# Patient Record
Sex: MALE | Race: WHITE | Employment: OTHER | RURAL
[De-identification: names, ages, dates, MRNs, and addresses within clinical notes are randomized per-mention and may not be internally consistent; named-entity substitution may affect disease eponyms.]

---

## 2019-05-23 ENCOUNTER — HOSPITAL ENCOUNTER (OUTPATIENT)
Dept: CT IMAGING | Facility: HOSPITAL | Age: 74
Discharge: HOME | End: 2019-05-23
Payer: MEDICARE

## 2019-05-23 DIAGNOSIS — R63.4 WEIGHT LOSS: ICD-10-CM

## 2019-05-23 DIAGNOSIS — Z72.0 TOBACCO ABUSE: ICD-10-CM

## 2019-05-23 PROCEDURE — G0297 LDCT FOR LUNG CA SCREEN: HCPCS

## 2019-06-04 ENCOUNTER — APPOINTMENT (OUTPATIENT)
Dept: GENERAL RADIOLOGY | Facility: HOSPITAL | Age: 74
End: 2019-06-04

## 2019-06-04 ENCOUNTER — HOSPITAL ENCOUNTER (EMERGENCY)
Facility: HOSPITAL | Age: 74
Discharge: HOME | End: 2019-06-04
Attending: EMERGENCY MEDICINE | Admitting: EMERGENCY MEDICINE

## 2019-06-04 VITALS
WEIGHT: 195 LBS | HEIGHT: 72 IN | BODY MASS INDEX: 26.41 KG/M2 | OXYGEN SATURATION: 94 % | DIASTOLIC BLOOD PRESSURE: 69 MMHG | HEART RATE: 75 BPM | TEMPERATURE: 99.4 F | RESPIRATION RATE: 18 BRPM | SYSTOLIC BLOOD PRESSURE: 122 MMHG

## 2019-06-04 DIAGNOSIS — T67.9XXA HEAT EXPOSURE, INITIAL ENCOUNTER: Primary | ICD-10-CM

## 2019-06-04 DIAGNOSIS — T67.5XXA HEAT EXHAUSTION, INITIAL ENCOUNTER: ICD-10-CM

## 2019-06-04 DIAGNOSIS — E87.6 HYPOKALEMIA: ICD-10-CM

## 2019-06-04 DIAGNOSIS — R50.9 HYPERTHERMIA: ICD-10-CM

## 2019-06-04 LAB
ALBUMIN SERPL-MCNC: 3.9 G/DL (ref 3.5–5.2)
ALBUMIN/GLOB SERPL: 1.2 G/DL
ALP SERPL-CCNC: 83 U/L (ref 39–117)
ALT SERPL W P-5'-P-CCNC: 10 U/L (ref 1–41)
ANION GAP SERPL CALCULATED.3IONS-SCNC: 15 MMOL/L
AST SERPL-CCNC: 15 U/L (ref 1–40)
BACTERIA UR QL AUTO: ABNORMAL /HPF
BASOPHILS # BLD AUTO: 0.03 10*3/MM3 (ref 0–0.2)
BASOPHILS NFR BLD AUTO: 0.1 % (ref 0–1.5)
BILIRUB SERPL-MCNC: 0.5 MG/DL (ref 0.2–1.2)
BILIRUB UR QL STRIP: NEGATIVE
BUN BLD-MCNC: 14 MG/DL (ref 8–23)
BUN/CREAT SERPL: 11.9 (ref 7–25)
CALCIUM SPEC-SCNC: 9.3 MG/DL (ref 8.6–10.5)
CHLORIDE SERPL-SCNC: 111 MMOL/L (ref 98–107)
CK SERPL-CCNC: 167 U/L (ref 20–200)
CLARITY UR: CLEAR
CO2 SERPL-SCNC: 18 MMOL/L (ref 22–29)
COLOR UR: YELLOW
CREAT BLD-MCNC: 1.18 MG/DL (ref 0.76–1.27)
D-LACTATE SERPL-SCNC: 1.8 MMOL/L (ref 0.5–2)
DEPRECATED RDW RBC AUTO: 49.9 FL (ref 37–54)
EOSINOPHIL # BLD AUTO: 0.01 10*3/MM3 (ref 0–0.4)
EOSINOPHIL NFR BLD AUTO: 0 % (ref 0.3–6.2)
ERYTHROCYTE [DISTWIDTH] IN BLOOD BY AUTOMATED COUNT: 15.6 % (ref 12.3–15.4)
GFR SERPL CREATININE-BSD FRML MDRD: 60 ML/MIN/1.73
GLOBULIN UR ELPH-MCNC: 3.2 GM/DL
GLUCOSE BLD-MCNC: 151 MG/DL (ref 65–99)
GLUCOSE UR STRIP-MCNC: NEGATIVE MG/DL
HCT VFR BLD AUTO: 41.2 % (ref 37.5–51)
HGB BLD-MCNC: 13.6 G/DL (ref 13–17.7)
HGB UR QL STRIP.AUTO: ABNORMAL
HOLD SPECIMEN: NORMAL
HOLD SPECIMEN: NORMAL
IMM GRANULOCYTES # BLD AUTO: 0.07 10*3/MM3 (ref 0–0.05)
IMM GRANULOCYTES NFR BLD AUTO: 0.3 % (ref 0–0.5)
INR PPP: 1.17 (ref 0.85–1.16)
KETONES UR QL STRIP: NEGATIVE
LEUKOCYTE ESTERASE UR QL STRIP.AUTO: NEGATIVE
LYMPHOCYTES # BLD AUTO: 0.93 10*3/MM3 (ref 0.7–3.1)
LYMPHOCYTES NFR BLD AUTO: 4.5 % (ref 19.6–45.3)
MAGNESIUM SERPL-MCNC: 2.1 MG/DL (ref 1.6–2.4)
MCH RBC QN AUTO: 29.2 PG (ref 26.6–33)
MCHC RBC AUTO-ENTMCNC: 33 G/DL (ref 31.5–35.7)
MCV RBC AUTO: 88.6 FL (ref 79–97)
MONOCYTES # BLD AUTO: 0.96 10*3/MM3 (ref 0.1–0.9)
MONOCYTES NFR BLD AUTO: 4.7 % (ref 5–12)
MYOGLOBIN SERPL-MCNC: 76.6 NG/ML (ref 28–72)
NEUTROPHILS # BLD AUTO: 18.65 10*3/MM3 (ref 1.7–7)
NEUTROPHILS NFR BLD AUTO: 90.7 % (ref 42.7–76)
NITRITE UR QL STRIP: NEGATIVE
PH UR STRIP.AUTO: 7 [PH] (ref 5–8)
PLATELET # BLD AUTO: 212 10*3/MM3 (ref 140–450)
PMV BLD AUTO: 10.3 FL (ref 6–12)
POTASSIUM BLD-SCNC: 3.1 MMOL/L (ref 3.5–5.2)
PROT SERPL-MCNC: 7.1 G/DL (ref 6–8.5)
PROT UR QL STRIP: NEGATIVE
PROTHROMBIN TIME: 14.3 SECONDS (ref 11.2–14.3)
RBC # BLD AUTO: 4.65 10*6/MM3 (ref 4.14–5.8)
RBC # UR: ABNORMAL /HPF
REF LAB TEST METHOD: ABNORMAL
SODIUM BLD-SCNC: 144 MMOL/L (ref 136–145)
SP GR UR STRIP: <=1.005 (ref 1–1.03)
SQUAMOUS #/AREA URNS HPF: ABNORMAL /HPF
T4 FREE SERPL-MCNC: 1.07 NG/DL (ref 0.93–1.7)
THEOPHYLLINE SERPL-MCNC: 5 MCG/ML (ref 10–20)
TROPONIN T SERPL-MCNC: <0.01 NG/ML (ref 0–0.03)
TROPONIN T SERPL-MCNC: <0.01 NG/ML (ref 0–0.03)
TSH SERPL DL<=0.05 MIU/L-ACNC: 1.25 MIU/ML (ref 0.27–4.2)
UROBILINOGEN UR QL STRIP: ABNORMAL
WBC NRBC COR # BLD: 20.58 10*3/MM3 (ref 3.4–10.8)
WBC UR QL AUTO: ABNORMAL /HPF
WHOLE BLOOD HOLD SPECIMEN: NORMAL
WHOLE BLOOD HOLD SPECIMEN: NORMAL

## 2019-06-04 PROCEDURE — 85007 BL SMEAR W/DIFF WBC COUNT: CPT | Performed by: EMERGENCY MEDICINE

## 2019-06-04 PROCEDURE — 96360 HYDRATION IV INFUSION INIT: CPT

## 2019-06-04 PROCEDURE — 84439 ASSAY OF FREE THYROXINE: CPT | Performed by: EMERGENCY MEDICINE

## 2019-06-04 PROCEDURE — 71045 X-RAY EXAM CHEST 1 VIEW: CPT

## 2019-06-04 PROCEDURE — 96361 HYDRATE IV INFUSION ADD-ON: CPT

## 2019-06-04 PROCEDURE — 93005 ELECTROCARDIOGRAM TRACING: CPT | Performed by: EMERGENCY MEDICINE

## 2019-06-04 PROCEDURE — 87040 BLOOD CULTURE FOR BACTERIA: CPT | Performed by: EMERGENCY MEDICINE

## 2019-06-04 PROCEDURE — 82550 ASSAY OF CK (CPK): CPT | Performed by: EMERGENCY MEDICINE

## 2019-06-04 PROCEDURE — 81001 URINALYSIS AUTO W/SCOPE: CPT | Performed by: EMERGENCY MEDICINE

## 2019-06-04 PROCEDURE — 99285 EMERGENCY DEPT VISIT HI MDM: CPT

## 2019-06-04 PROCEDURE — 83874 ASSAY OF MYOGLOBIN: CPT | Performed by: EMERGENCY MEDICINE

## 2019-06-04 PROCEDURE — 85610 PROTHROMBIN TIME: CPT | Performed by: EMERGENCY MEDICINE

## 2019-06-04 PROCEDURE — 80198 ASSAY OF THEOPHYLLINE: CPT | Performed by: EMERGENCY MEDICINE

## 2019-06-04 PROCEDURE — 83735 ASSAY OF MAGNESIUM: CPT | Performed by: EMERGENCY MEDICINE

## 2019-06-04 PROCEDURE — 83605 ASSAY OF LACTIC ACID: CPT | Performed by: EMERGENCY MEDICINE

## 2019-06-04 PROCEDURE — 84484 ASSAY OF TROPONIN QUANT: CPT | Performed by: EMERGENCY MEDICINE

## 2019-06-04 PROCEDURE — 80050 GENERAL HEALTH PANEL: CPT | Performed by: EMERGENCY MEDICINE

## 2019-06-04 RX ORDER — TOPIRAMATE 50 MG/1
50 TABLET, FILM COATED ORAL 2 TIMES DAILY
COMMUNITY

## 2019-06-04 RX ORDER — BUDESONIDE AND FORMOTEROL FUMARATE DIHYDRATE 160; 4.5 UG/1; UG/1
2 AEROSOL RESPIRATORY (INHALATION)
COMMUNITY
End: 2019-06-17

## 2019-06-04 RX ORDER — ENALAPRIL MALEATE 20 MG/1
20 TABLET ORAL NIGHTLY
COMMUNITY
End: 2022-05-03

## 2019-06-04 RX ORDER — ROPINIROLE 1 MG/1
1 TABLET, FILM COATED ORAL NIGHTLY
COMMUNITY

## 2019-06-04 RX ORDER — POTASSIUM CHLORIDE 750 MG/1
20 CAPSULE, EXTENDED RELEASE ORAL ONCE
Status: COMPLETED | OUTPATIENT
Start: 2019-06-04 | End: 2019-06-04

## 2019-06-04 RX ORDER — HYDROCODONE BITARTRATE AND ACETAMINOPHEN 5; 325 MG/1; MG/1
1 TABLET ORAL 2 TIMES DAILY
Status: ON HOLD | COMMUNITY
End: 2019-08-30 | Stop reason: SDUPTHER

## 2019-06-04 RX ORDER — SODIUM CHLORIDE 0.9 % (FLUSH) 0.9 %
10 SYRINGE (ML) INJECTION AS NEEDED
Status: DISCONTINUED | OUTPATIENT
Start: 2019-06-04 | End: 2019-06-05 | Stop reason: HOSPADM

## 2019-06-04 RX ORDER — LEVOFLOXACIN 500 MG/1
500 TABLET, FILM COATED ORAL DAILY
COMMUNITY
End: 2019-07-02

## 2019-06-04 RX ORDER — SODIUM CHLORIDE 9 MG/ML
125 INJECTION, SOLUTION INTRAVENOUS CONTINUOUS
Status: DISCONTINUED | OUTPATIENT
Start: 2019-06-04 | End: 2019-06-05 | Stop reason: HOSPADM

## 2019-06-04 RX ORDER — SIMVASTATIN 10 MG
10 TABLET ORAL NIGHTLY
COMMUNITY
End: 2019-08-21

## 2019-06-04 RX ORDER — MONTELUKAST SODIUM 10 MG/1
10 TABLET ORAL NIGHTLY
COMMUNITY

## 2019-06-04 RX ORDER — DOCUSATE SODIUM 100 MG/1
100 CAPSULE, LIQUID FILLED ORAL NIGHTLY PRN
COMMUNITY

## 2019-06-04 RX ORDER — ACETAMINOPHEN 500 MG
1000 TABLET ORAL ONCE
Status: COMPLETED | OUTPATIENT
Start: 2019-06-04 | End: 2019-06-04

## 2019-06-04 RX ORDER — ERGOCALCIFEROL 1.25 MG/1
50000 CAPSULE ORAL WEEKLY
COMMUNITY
End: 2019-06-17

## 2019-06-04 RX ORDER — AMLODIPINE BESYLATE 10 MG/1
10 TABLET ORAL DAILY
COMMUNITY

## 2019-06-04 RX ORDER — RANITIDINE 150 MG/1
150 TABLET ORAL 2 TIMES DAILY
COMMUNITY
End: 2021-03-31

## 2019-06-04 RX ORDER — FINASTERIDE 5 MG/1
5 TABLET, FILM COATED ORAL DAILY
COMMUNITY

## 2019-06-04 RX ORDER — PROCHLORPERAZINE MALEATE 10 MG
10 TABLET ORAL EVERY 6 HOURS PRN
COMMUNITY
End: 2019-08-21

## 2019-06-04 RX ORDER — METHOCARBAMOL 500 MG/1
500 TABLET, FILM COATED ORAL 2 TIMES DAILY
COMMUNITY
End: 2021-03-31

## 2019-06-04 RX ORDER — LANOLIN ALCOHOL/MO/W.PET/CERES
1000 CREAM (GRAM) TOPICAL DAILY
COMMUNITY
End: 2019-06-17

## 2019-06-04 RX ADMIN — SODIUM CHLORIDE 1000 ML: 9 INJECTION, SOLUTION INTRAVENOUS at 18:51

## 2019-06-04 RX ADMIN — POTASSIUM CHLORIDE 20 MEQ: 750 CAPSULE, EXTENDED RELEASE ORAL at 20:04

## 2019-06-04 RX ADMIN — ACETAMINOPHEN 1000 MG: 500 TABLET, FILM COATED ORAL at 20:03

## 2019-06-04 RX ADMIN — SODIUM CHLORIDE 125 ML/HR: 9 INJECTION, SOLUTION INTRAVENOUS at 19:46

## 2019-06-04 NOTE — ED PROVIDER NOTES
"Subjective   Angi Marinelli is a 74 y.o.male who presents to the ED with complaints of a hyperthermia. 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 When they arrived to the scene, the patient was alert and his temperature was 106. En route to the emergency department, they gave the patient cold compresses which brought his temperature to 102. He also complains of a cough and congestion, but he denies any dysuria, shortness of breath, rhinorrhea, hematuria, hematochezia, chest pain, or leg swelling. Additionally, he was recently diagnosed with pneumonia, for which he is taking Levaquin. Furthermore, he has a history of three heat strokes, HTN, HLD, and COPD. During his previous heat strokes, he states he was \"out working and not drinking\" just like he was today. There are no other complaints at this time.  Patient reports he feels markedly improved from onset of transport.        History provided by:  Patient and EMS personnel  Fever   Max temp prior to arrival:  106  Temp source:  Oral  Severity:  Severe  Onset quality:  Sudden  Duration:  1 day  Timing:  Constant  Progression:  Unchanged  Chronicity:  New  Relieved by:  Cold compresses  Worsened by:  Nothing  Associated symptoms: congestion and cough    Associated symptoms: no chest pain, no chills, no diarrhea, no dysuria, no myalgias, no nausea, no rash, no rhinorrhea, no sore throat and no vomiting    Associated symptoms comment:  Cough 1 week ago now nearly completely resolved  Her medical crew reports he was confused on their arrival but rapidly resolved after cooling via air conditioning in the helicopter and cool compresses with cool IV fluids  Risk factors: no immunosuppression, no recent travel and no sick contacts        Review of Systems   Constitutional: Positive for fever. Negative for chills.   HENT: Positive for congestion. Negative for rhinorrhea and sore throat.    Eyes: Negative.  "   Respiratory: Positive for cough. Negative for shortness of breath.    Cardiovascular: Negative.  Negative for chest pain and leg swelling.   Gastrointestinal: Negative.  Negative for blood in stool, diarrhea, nausea and vomiting.   Genitourinary: Negative.  Negative for dysuria and hematuria.   Musculoskeletal: Negative for myalgias.   Skin: Negative.  Negative for rash.   Neurological:        Weakness on EMS and [employer] arrival resolved now   All other systems reviewed and are negative.      Past Medical History:   Diagnosis Date   • Arthritis    • COPD (chronic obstructive pulmonary disease) (CMS/HCC)    • GERD (gastroesophageal reflux disease)    • Hyperlipidemia    • Hypertension    • Migraine    • Restless leg syndrome        Allergies   Allergen Reactions   • Penicillins Rash       Past Surgical History:   Procedure Laterality Date   • ARTERIAL ANEURYSM REPAIR         History reviewed. No pertinent family history.    Social History     Socioeconomic History   • Marital status:      Spouse name: Not on file   • Number of children: Not on file   • Years of education: Not on file   • Highest education level: Not on file   Tobacco Use   • Smoking status: Former Smoker   Substance and Sexual Activity   • Alcohol use: No     Frequency: Never   • Drug use: No         Objective   Physical Exam   Constitutional: He is oriented to person, place, and time. He appears well-developed and well-nourished. No distress.   HENT:   Head: Normocephalic and atraumatic.   Nose: Nose normal.   Small laceration on the dorsum of the tongue.    Eyes: Conjunctivae and EOM are normal. Pupils are equal, round, and reactive to light. Right eye exhibits no discharge. Left eye exhibits no discharge. No scleral icterus.   Neck: Normal range of motion. Neck supple.   Cardiovascular: Regular rhythm, normal heart sounds and intact distal pulses. Exam reveals no gallop and no friction rub.   No murmur heard.  Pulmonary/Chest: Effort  normal. No respiratory distress. He has no wheezes. He has rhonchi. He has no rales. He exhibits no tenderness.   Mild bilateral rhonchi.  Clear partially with deep inspiration and cough   Abdominal: Soft. Bowel sounds are normal. There is no tenderness.   Musculoskeletal: Normal range of motion. He exhibits no edema, tenderness or deformity.   Neurological: He is alert and oriented to person, place, and time. He displays normal reflexes. No cranial nerve deficit or sensory deficit. He exhibits normal muscle tone. Coordination normal.   Cranial nerves II through XII are intact.  DTRs strength and sensation are intact.  There is no dysarthria aphasia or ataxia.  Neurologically intact   Skin: Skin is warm and dry.   Psychiatric: He has a normal mood and affect. His behavior is normal.   Nursing note and vitals reviewed.      Procedures         ED Course  ED Course as of Jun 05 0047   Tue Jun 04, 2019 2228 Patient is serially reevaluated throughout ED course with last reevaluation now.  I discussed findings and evaluation.  His CPK is normal.  His myoglobin is minimally elevated.  He does dip positive for moderate blood.  He has been thoroughly hydrated here in the ED.In discussion with patient and family this is at least the third episode of identical symptoms with heat exposure and marked hyperthermia.I discussed the evaluation but have asked him to see his doctor in the office this week for reevaluation, to trim any of the medications from his profile that may be contributing to this.  I have asked him to avoid heat exposure and absolutely refrain from it for the next 6 weeks.  Of discussed excellent hydration but mandatory reevaluation this week in the office to recheck his renal function studies and labsWe discussed indications for immediate return to the ED.  I have explained this at length with the patient and family.Very pleasant reliable patient and caring family verbalized understanding agreement plan of  care, need for follow-up, and indications for immediate return, as well as restrictions.  [HH]      ED Course User Index  [HH] Javier Bullard MD     Recent Results (from the past 24 hour(s))   Comprehensive Metabolic Panel    Collection Time: 06/04/19  6:56 PM   Result Value Ref Range    Glucose 151 (H) 65 - 99 mg/dL    BUN 14 8 - 23 mg/dL    Creatinine 1.18 0.76 - 1.27 mg/dL    Sodium 144 136 - 145 mmol/L    Potassium 3.1 (L) 3.5 - 5.2 mmol/L    Chloride 111 (H) 98 - 107 mmol/L    CO2 18.0 (L) 22.0 - 29.0 mmol/L    Calcium 9.3 8.6 - 10.5 mg/dL    Total Protein 7.1 6.0 - 8.5 g/dL    Albumin 3.90 3.50 - 5.20 g/dL    ALT (SGPT) 10 1 - 41 U/L    AST (SGOT) 15 1 - 40 U/L    Alkaline Phosphatase 83 39 - 117 U/L    Total Bilirubin 0.5 0.2 - 1.2 mg/dL    eGFR Non African Amer 60 (L) >60 mL/min/1.73    Globulin 3.2 gm/dL    A/G Ratio 1.2 g/dL    BUN/Creatinine Ratio 11.9 7.0 - 25.0    Anion Gap 15.0 mmol/L   Troponin    Collection Time: 06/04/19  6:56 PM   Result Value Ref Range    Troponin T <0.010 0.000 - 0.030 ng/mL   Protime-INR    Collection Time: 06/04/19  6:56 PM   Result Value Ref Range    Protime 14.3 11.2 - 14.3 Seconds    INR 1.17 (H) 0.85 - 1.16   TSH    Collection Time: 06/04/19  6:56 PM   Result Value Ref Range    TSH 1.250 0.270 - 4.200 mIU/mL   Magnesium    Collection Time: 06/04/19  6:56 PM   Result Value Ref Range    Magnesium 2.1 1.6 - 2.4 mg/dL   T4, Free    Collection Time: 06/04/19  6:56 PM   Result Value Ref Range    Free T4 1.07 0.93 - 1.70 ng/dL   Light Blue Top    Collection Time: 06/04/19  6:56 PM   Result Value Ref Range    Extra Tube hold for add-on    Green Top (Gel)    Collection Time: 06/04/19  6:56 PM   Result Value Ref Range    Extra Tube Hold for add-ons.    Lavender Top    Collection Time: 06/04/19  6:56 PM   Result Value Ref Range    Extra Tube hold for add-on    Gold Top - SST    Collection Time: 06/04/19  6:56 PM   Result Value Ref Range    Extra Tube Hold for add-ons.    CBC Auto  Differential    Collection Time: 06/04/19  6:56 PM   Result Value Ref Range    WBC 20.58 (H) 3.40 - 10.80 10*3/mm3    RBC 4.65 4.14 - 5.80 10*6/mm3    Hemoglobin 13.6 13.0 - 17.7 g/dL    Hematocrit 41.2 37.5 - 51.0 %    MCV 88.6 79.0 - 97.0 fL    MCH 29.2 26.6 - 33.0 pg    MCHC 33.0 31.5 - 35.7 g/dL    RDW 15.6 (H) 12.3 - 15.4 %    RDW-SD 49.9 37.0 - 54.0 fl    MPV 10.3 6.0 - 12.0 fL    Platelets 212 140 - 450 10*3/mm3    Neutrophil % 90.7 (H) 42.7 - 76.0 %    Lymphocyte % 4.5 (L) 19.6 - 45.3 %    Monocyte % 4.7 (L) 5.0 - 12.0 %    Eosinophil % 0.0 (L) 0.3 - 6.2 %    Basophil % 0.1 0.0 - 1.5 %    Immature Grans % 0.3 0.0 - 0.5 %    Neutrophils, Absolute 18.65 (H) 1.70 - 7.00 10*3/mm3    Lymphocytes, Absolute 0.93 0.70 - 3.10 10*3/mm3    Monocytes, Absolute 0.96 (H) 0.10 - 0.90 10*3/mm3    Eosinophils, Absolute 0.01 0.00 - 0.40 10*3/mm3    Basophils, Absolute 0.03 0.00 - 0.20 10*3/mm3    Immature Grans, Absolute 0.07 (H) 0.00 - 0.05 10*3/mm3   CK    Collection Time: 06/04/19  6:56 PM   Result Value Ref Range    Creatine Kinase 167 20 - 200 U/L   Myoglobin, Serum    Collection Time: 06/04/19  6:56 PM   Result Value Ref Range    Myoglobin 76.6 (H) 28.0 - 72.0 ng/mL   Theophylline Level    Collection Time: 06/04/19  6:56 PM   Result Value Ref Range    Theophylline Level 5.0 (L) 10.0 - 20.0 mcg/mL   Urinalysis With Culture If Indicated - Urine, Clean Catch    Collection Time: 06/04/19  8:07 PM   Result Value Ref Range    Color, UA Yellow Yellow, Straw    Appearance, UA Clear Clear    pH, UA 7.0 5.0 - 8.0    Specific Gravity, UA <=1.005 1.005 - 1.030    Glucose, UA Negative Negative    Ketones, UA Negative Negative    Bilirubin, UA Negative Negative    Blood, UA Moderate (2+) (A) Negative    Protein, UA Negative Negative    Leuk Esterase, UA Negative Negative    Nitrite, UA Negative Negative    Urobilinogen, UA 0.2 E.U./dL 0.2 - 1.0 E.U./dL   Urinalysis, Microscopic Only - Urine, Clean Catch    Collection Time: 06/04/19   8:07 PM   Result Value Ref Range    RBC, UA 7-12 (A) None Seen, 0-2 /HPF    WBC, UA 0-2 None Seen, 0-2 /HPF    Bacteria, UA None Seen None Seen, Trace /HPF    Squamous Epithelial Cells, UA 0-2 None Seen, 0-2 /HPF    Methodology Automated Microscopy    Lactic Acid, Plasma    Collection Time: 06/04/19  8:24 PM   Result Value Ref Range    Lactate 1.8 0.5 - 2.0 mmol/L   Troponin    Collection Time: 06/04/19  9:29 PM   Result Value Ref Range    Troponin T <0.010 0.000 - 0.030 ng/mL     Note: In addition to lab results from this visit, the labs listed above may include labs taken at another facility or during a different encounter within the last 24 hours. Please correlate lab times with ED admission and discharge times for further clarification of the services performed during this visit.    XR Chest 1 View   Final Result   No acute cardiopulmonary findings.      Signer Name: Cosme Knapp MD    Signed: 6/4/2019 7:26 PM    Workstation Name: [id number]            Vitals:    06/04/19 2130 06/04/19 2200 06/04/19 2229 06/04/19 2230   BP: 121/64 115/65  122/69   Pulse: 83 75 75    Resp:       Temp:       TempSrc:       SpO2: 91% 92% 93% 94%   Weight:       Height:         Medications   sodium chloride 0.9 % flush 10 mL (not administered)   sodium chloride 0.9 % infusion (0 mL/hr Intravenous Stopped 6/4/19 2234)   sodium chloride 0.9 % bolus 1,000 mL (0 mL Intravenous Stopped 6/4/19 1945)   acetaminophen (TYLENOL) tablet 1,000 mg (1,000 mg Oral Given 6/4/19 2003)   potassium chloride (MICRO-K) CR capsule 20 mEq (20 mEq Oral Given 6/4/19 2004)     ECG/EMG Results (last 24 hours)     ** No results found for the last 24 hours. **        ECG 12 Lead   Final Result   Test Reason : RPT   Blood Pressure : **/** mmHG   Vent. Rate : 080 BPM     Atrial Rate : 080 BPM      P-R Int : 180 ms          QRS Dur : 112 ms       QT Int : 414 ms       P-R-T Axes : 050 033 032 degrees      QTc Int : 477 ms      Normal sinus rhythm   When compared  with ECG of 04-JUN-2019 18:46,   No significant change was found   Confirmed by DANIEL BULLARD MD ([id number]) on 6/4/2019 10:14:05 PM      Referred By:  EDMD           Confirmed By:DANIEL BULLARD MD      ECG 12 Lead   Final Result   Test Reason : HEAT STROKE   Blood Pressure : **/** mmHG   Vent. Rate : 109 BPM     Atrial Rate : 109 BPM      P-R Int : 172 ms          QRS Dur : 114 ms       QT Int : 360 ms       P-R-T Axes : 061 047 045 degrees      QTc Int : 484 ms      Sinus tachycardia   Nonspecific ST abnormality   Abnormal ECG   No previous ECGs available   Confirmed by DANIEL BULLARD MD ([id number]) on 6/4/2019 7:36:27 PM      Referred By:  JOSE           Confirmed By:DANIEL BULLARD MD                          MDM    Final diagnoses:   Heat exposure, initial encounter   Heat exhaustion, initial encounter   Hyperthermia   Hypokalemia       Documentation assistance provided by chetan Fisher.  Information recorded by the scribe was done at my direction and has been verified and validated by me.     Matthew Fisher  06/04/19 1850       Matthew Fisher  06/04/19 2217       Daniel Bullard MD  06/05/19 0043       Daniel Bullard MD  06/05/19 0047

## 2019-06-05 NOTE — DISCHARGE INSTRUCTIONS
Follow up with your primary care provider in 24-48 hours.  Recheck labs including muscle enzymes and kidney functions please.    Have your physician look through your medication list and see if there are any medications that could be contributing to this that could possibly be discontinued, like the theophylline    Push fluids.    Absolute restriction from heat exposure for the next 6 weeks to prevent immediate recurrence and complications    Immediately return to the ER if you develop any new or worsening symptoms.

## 2019-06-09 LAB
BACTERIA SPEC AEROBE CULT: NORMAL
BACTERIA SPEC AEROBE CULT: NORMAL

## 2019-06-17 ENCOUNTER — PREP FOR SURGERY (OUTPATIENT)
Dept: OTHER | Facility: HOSPITAL | Age: 74
End: 2019-06-17

## 2019-06-17 ENCOUNTER — APPOINTMENT (OUTPATIENT)
Dept: PREADMISSION TESTING | Facility: HOSPITAL | Age: 74
End: 2019-06-17

## 2019-06-17 ENCOUNTER — OFFICE VISIT (OUTPATIENT)
Dept: PULMONOLOGY | Facility: CLINIC | Age: 74
End: 2019-06-17

## 2019-06-17 VITALS
OXYGEN SATURATION: 97 % | HEIGHT: 72 IN | DIASTOLIC BLOOD PRESSURE: 68 MMHG | WEIGHT: 192 LBS | BODY MASS INDEX: 26.01 KG/M2 | HEART RATE: 66 BPM | SYSTOLIC BLOOD PRESSURE: 118 MMHG

## 2019-06-17 VITALS
HEIGHT: 72 IN | DIASTOLIC BLOOD PRESSURE: 66 MMHG | OXYGEN SATURATION: 97 % | WEIGHT: 191 LBS | SYSTOLIC BLOOD PRESSURE: 108 MMHG | BODY MASS INDEX: 25.87 KG/M2 | RESPIRATION RATE: 16 BRPM | HEART RATE: 80 BPM

## 2019-06-17 DIAGNOSIS — R93.89 ABNORMAL CT OF THE CHEST: ICD-10-CM

## 2019-06-17 DIAGNOSIS — R59.0 MEDIASTINAL LYMPHADENOPATHY: ICD-10-CM

## 2019-06-17 DIAGNOSIS — R06.02 SOB (SHORTNESS OF BREATH): Primary | ICD-10-CM

## 2019-06-17 DIAGNOSIS — J44.9 CHRONIC OBSTRUCTIVE PULMONARY DISEASE, UNSPECIFIED COPD TYPE (HCC): ICD-10-CM

## 2019-06-17 DIAGNOSIS — IMO0001 LUNG NODULE < 6CM ON CT: ICD-10-CM

## 2019-06-17 DIAGNOSIS — R93.89 ABNORMAL CT OF THE CHEST: Primary | ICD-10-CM

## 2019-06-17 DIAGNOSIS — F17.200 SMOKING: ICD-10-CM

## 2019-06-17 DIAGNOSIS — R91.8 LUNG NODULE, MULTIPLE: ICD-10-CM

## 2019-06-17 PROCEDURE — 94726 PLETHYSMOGRAPHY LUNG VOLUMES: CPT | Performed by: INTERNAL MEDICINE

## 2019-06-17 PROCEDURE — 94060 EVALUATION OF WHEEZING: CPT | Performed by: INTERNAL MEDICINE

## 2019-06-17 PROCEDURE — 93005 ELECTROCARDIOGRAM TRACING: CPT | Performed by: INTERNAL MEDICINE

## 2019-06-17 PROCEDURE — 99205 OFFICE O/P NEW HI 60 MIN: CPT | Performed by: INTERNAL MEDICINE

## 2019-06-17 PROCEDURE — 94729 DIFFUSING CAPACITY: CPT | Performed by: INTERNAL MEDICINE

## 2019-06-17 RX ORDER — IPRATROPIUM BROMIDE AND ALBUTEROL SULFATE 2.5; .5 MG/3ML; MG/3ML
3 SOLUTION RESPIRATORY (INHALATION) EVERY 6 HOURS PRN
COMMUNITY

## 2019-06-17 RX ORDER — CHOLECALCIFEROL (VITAMIN D3) 1250 MCG
CAPSULE ORAL
COMMUNITY
End: 2021-03-31

## 2019-06-17 RX ORDER — BUDESONIDE AND FORMOTEROL FUMARATE DIHYDRATE 160; 4.5 UG/1; UG/1
AEROSOL RESPIRATORY (INHALATION) EVERY 12 HOURS SCHEDULED
COMMUNITY
End: 2019-07-08

## 2019-06-17 RX ORDER — MAGNESIUM 200 MG
TABLET ORAL
COMMUNITY
End: 2019-06-27 | Stop reason: HOSPADM

## 2019-06-17 RX ORDER — LEVALBUTEROL TARTRATE 45 UG/1
2 AEROSOL, METERED ORAL EVERY 6 HOURS PRN
Qty: 1 INHALER | Refills: 5 | Status: SHIPPED | OUTPATIENT
Start: 2019-06-17

## 2019-06-17 RX ORDER — GUAIFENESIN 200 MG/1
400 TABLET ORAL EVERY 4 HOURS PRN
COMMUNITY
End: 2019-08-21

## 2019-06-17 RX ORDER — AZITHROMYCIN 250 MG/1
250 TABLET, FILM COATED ORAL DAILY
COMMUNITY
End: 2019-06-27 | Stop reason: HOSPADM

## 2019-06-17 RX ORDER — SODIUM CHLORIDE 9 MG/ML
42 INJECTION, SOLUTION INTRAVENOUS CONTINUOUS
Status: CANCELLED | OUTPATIENT
Start: 2019-06-17

## 2019-06-17 RX ORDER — NICOTINE 21 MG/24HR
PATCH, TRANSDERMAL 24 HOURS TRANSDERMAL
Refills: 0 | COMMUNITY
Start: 2019-06-05 | End: 2021-03-31

## 2019-06-17 RX ORDER — ROFLUMILAST 500 UG/1
500 TABLET ORAL DAILY
Refills: 2 | COMMUNITY
Start: 2019-06-05 | End: 2020-01-06

## 2019-06-17 NOTE — PROGRESS NOTES
CONSULT NOTE    Requested by:   Georgiana Cortés DO Bobrowski, Brittany, DO      Chief Complaint   Patient presents with   • Consult   • Breathing Problem       Subjective:  Angi Marinelli is a 74 y.o. male.     History of Present Illness   Patient comes in today for consultation because of abnormal CT.    Patient underwent a CT due to history smoking history and ?weight loss.  he was told that the imaging study showed abnormality and lung nodules for which a pulmonology consultation was requested    Patient says that he is a smoker and has been doing so for the past 60 years. The patient describes family members with a history of lung cancer, in his sister.      He denies any shortness of breath, cough or phlegm production.    Used to work in saw mills and cement factories.      The following portions of the patient's history were reviewed and updated as appropriate: allergies, current medications, past family history, past medical history, past social history and past surgical history.    Review of Systems   Constitutional: Negative for chills, fatigue and fever.   HENT: Negative for sinus pressure, sneezing and sore throat.    Respiratory: Negative for cough, chest tightness, shortness of breath and wheezing.    Cardiovascular: Negative for palpitations and leg swelling.   Psychiatric/Behavioral: Negative for sleep disturbance.   All other systems reviewed and are negative.      Past Medical History:   Diagnosis Date   • Abnormal CT lung screening    • Arthritis    • COPD (chronic obstructive pulmonary disease) (CMS/[facility])    • Full dentures    • GERD (gastroesophageal reflux disease)    • Heat stroke     x 3   • Hyperlipidemia    • Hypertension    • Migraine    • PONV (postoperative nausea and vomiting)    • Restless leg syndrome    • Tobacco abuse        Social History     Tobacco Use   • Smoking status: Current Every Day Smoker     Packs/day: 1.00     Years: 57.00     Pack years: 57.00     Types:  "Cigarettes   • Smokeless tobacco: Never Used   Substance Use Topics   • Alcohol use: No     Frequency: Never         Objective:  Visit Vitals  /66   Pulse 80   Resp 16   Ht 182.9 cm (72.01\")   Wt 86.6 kg (191 lb)   SpO2 97%   BMI 25.90 kg/m²       Physical Exam   Constitutional: He is oriented to person, place, and time. He appears well-developed.   HENT:   Dentures noted.   Right facial bandage in place.    Eyes: EOM are normal.   Neck: Neck supple. No JVD present.   Cardiovascular: Normal rate and regular rhythm.   Pulmonary/Chest: Effort normal. He has no wheezes. He has no rales.   Somewhat hyperresonant to percussion.  Decreased Air Entry Bilaterally.   Musculoskeletal:   Gait was normal.   Neurological: He is alert and oriented to person, place, and time.   Skin: Skin is warm and dry.   Psychiatric: He has a normal mood and affect. His behavior is normal.   Vitals reviewed.      Assessment/Plan:  Angi was seen today for consult and breathing problem.    Diagnoses and all orders for this visit:    Abnormal CT of the chest  -     Pulmonary Function Test    Lung nodule, multiple  -     Pulmonary Function Test    Smoking  -     Pulmonary Function Test    Chronic obstructive pulmonary disease, unspecified COPD type (CMS/HCC)    Mediastinal lymphadenopathy    Other orders  -     levalbuterol (XOPENEX HFA) 45 MCG/ACT inhaler; Inhale 2 puffs Every 6 (Six) Hours As Needed for Wheezing or Shortness of Air.        Return in about 3 weeks (around 7/8/2019) for Recheck.    DISCUSSION(if any):  I have reviewed the patient's imaging studies and shared them with him.  CT of the chest confirms right upper lobe/apical lung nodule measuring about 2.7 cm.  There also seems to be lymph nodes in the mediastinum.  The lymph nodes within the mediastinum appear to be somewhat calcified.  I also showed him the emphysema, which was visible on the CT scan.    I have also reviewed his last Oncology provider's office note that " mentions findings highly concerning for lung cancer with possible liver metastases.  It also mentioned that the patient was scheduled for a procedure involving his cheek and he wanted to wait for that procedure to be performed before further work-up.     Last ER note, from 6/4/2019 listed hyperthermia, hypokalemia and heat exhaustion.     ===========================  ===========================    PFTs were reviewed. Moderate COPD noted.     Laboratory workup was also reviewed which showed   Lab Results   Component Value Date    EOSABS 0.01 06/04/2019     Laboratory workup also showed   Lab Results   Component Value Date    CO2 18.0 (L) 06/04/2019       ===========================  ===========================    Patient was advised to start using rescue inhaler for when necessary purposes    Patient was also advised to keep a log of the use of rescue inhaler.      The risks of EBUS with bronchscopy including but not limited to damage to the overlying structures, pneumothorax, bleeding, worsening of respiratory failure, requirement for chest tube placement among others were explained.    The alternatives were also discussed with the patient      The patient has considered the above-mentioned risks, benefits and alternatives and has agreed to proceed with the procedure.      Dictated utilizing Dragon dictation.    This document was electronically signed by Iggy Beard MD on 06/17/19 at 1:17 PM

## 2019-06-27 ENCOUNTER — APPOINTMENT (OUTPATIENT)
Dept: GENERAL RADIOLOGY | Facility: HOSPITAL | Age: 74
End: 2019-06-27

## 2019-06-27 ENCOUNTER — HOSPITAL ENCOUNTER (OUTPATIENT)
Facility: HOSPITAL | Age: 74
Setting detail: HOSPITAL OUTPATIENT SURGERY
Discharge: HOME | End: 2019-06-27
Attending: INTERNAL MEDICINE | Admitting: INTERNAL MEDICINE

## 2019-06-27 ENCOUNTER — ANESTHESIA (OUTPATIENT)
Dept: PERIOP | Facility: HOSPITAL | Age: 74
End: 2019-06-27

## 2019-06-27 ENCOUNTER — ANESTHESIA EVENT (OUTPATIENT)
Dept: PERIOP | Facility: HOSPITAL | Age: 74
End: 2019-06-27

## 2019-06-27 VITALS
OXYGEN SATURATION: 95 % | RESPIRATION RATE: 16 BRPM | TEMPERATURE: 97.4 F | SYSTOLIC BLOOD PRESSURE: 108 MMHG | HEART RATE: 68 BPM | DIASTOLIC BLOOD PRESSURE: 67 MMHG

## 2019-06-27 DIAGNOSIS — R93.89 ABNORMAL CT OF THE CHEST: ICD-10-CM

## 2019-06-27 DIAGNOSIS — IMO0001 LUNG NODULE < 6CM ON CT: ICD-10-CM

## 2019-06-27 DIAGNOSIS — R59.0 MEDIASTINAL LYMPHADENOPATHY: ICD-10-CM

## 2019-06-27 LAB
DEPRECATED RDW RBC AUTO: 48.1 FL (ref 37–54)
ERYTHROCYTE [DISTWIDTH] IN BLOOD BY AUTOMATED COUNT: 14.6 % (ref 12.3–15.4)
HCT VFR BLD AUTO: 42.9 % (ref 37.5–51)
HGB BLD-MCNC: 13.7 G/DL (ref 13–17.7)
MCH RBC QN AUTO: 28.5 PG (ref 26.6–33)
MCHC RBC AUTO-ENTMCNC: 31.9 G/DL (ref 31.5–35.7)
MCV RBC AUTO: 89.4 FL (ref 79–97)
PLATELET # BLD AUTO: 242 10*3/MM3 (ref 140–450)
PMV BLD AUTO: 10 FL (ref 6–12)
RBC # BLD AUTO: 4.8 10*6/MM3 (ref 4.14–5.8)
WBC NRBC COR # BLD: 10.7 10*3/MM3 (ref 3.4–10.8)

## 2019-06-27 PROCEDURE — 94640 AIRWAY INHALATION TREATMENT: CPT

## 2019-06-27 PROCEDURE — 87206 SMEAR FLUORESCENT/ACID STAI: CPT | Performed by: INTERNAL MEDICINE

## 2019-06-27 PROCEDURE — 85027 COMPLETE CBC AUTOMATED: CPT | Performed by: INTERNAL MEDICINE

## 2019-06-27 PROCEDURE — 25010000002 MIDAZOLAM PER 1 MG: Performed by: NURSE ANESTHETIST, CERTIFIED REGISTERED

## 2019-06-27 PROCEDURE — 87181 SC STD AGAR DILUTION PER AGT: CPT | Performed by: INTERNAL MEDICINE

## 2019-06-27 PROCEDURE — 71045 X-RAY EXAM CHEST 1 VIEW: CPT

## 2019-06-27 PROCEDURE — C1726 CATH, BAL DIL, NON-VASCULAR: HCPCS | Performed by: INTERNAL MEDICINE

## 2019-06-27 PROCEDURE — 87102 FUNGUS ISOLATION CULTURE: CPT | Performed by: INTERNAL MEDICINE

## 2019-06-27 PROCEDURE — 87186 SC STD MICRODIL/AGAR DIL: CPT | Performed by: INTERNAL MEDICINE

## 2019-06-27 PROCEDURE — 25010000002 DEXAMETHASONE PER 1 MG: Performed by: NURSE ANESTHETIST, CERTIFIED REGISTERED

## 2019-06-27 PROCEDURE — 25010000002 FENTANYL CITRATE (PF) 100 MCG/2ML SOLUTION: Performed by: NURSE ANESTHETIST, CERTIFIED REGISTERED

## 2019-06-27 PROCEDURE — 87205 SMEAR GRAM STAIN: CPT | Performed by: INTERNAL MEDICINE

## 2019-06-27 PROCEDURE — 25010000002 PROPOFOL 200 MG/20ML EMULSION: Performed by: NURSE ANESTHETIST, CERTIFIED REGISTERED

## 2019-06-27 PROCEDURE — 31652 BRONCH EBUS SAMPLNG 1/2 NODE: CPT | Performed by: INTERNAL MEDICINE

## 2019-06-27 PROCEDURE — 25010000002 ONDANSETRON PER 1 MG: Performed by: NURSE ANESTHETIST, CERTIFIED REGISTERED

## 2019-06-27 PROCEDURE — 31628 BRONCHOSCOPY/LUNG BX EACH: CPT | Performed by: INTERNAL MEDICINE

## 2019-06-27 PROCEDURE — 87070 CULTURE OTHR SPECIMN AEROBIC: CPT | Performed by: INTERNAL MEDICINE

## 2019-06-27 PROCEDURE — 25010000002 SUCCINYLCHOLINE PER 20 MG: Performed by: NURSE ANESTHETIST, CERTIFIED REGISTERED

## 2019-06-27 PROCEDURE — 87116 MYCOBACTERIA CULTURE: CPT | Performed by: INTERNAL MEDICINE

## 2019-06-27 PROCEDURE — 31623 DX BRONCHOSCOPE/BRUSH: CPT | Performed by: INTERNAL MEDICINE

## 2019-06-27 PROCEDURE — 31624 DX BRONCHOSCOPE/LAVAGE: CPT | Performed by: INTERNAL MEDICINE

## 2019-06-27 PROCEDURE — 87077 CULTURE AEROBIC IDENTIFY: CPT | Performed by: INTERNAL MEDICINE

## 2019-06-27 RX ORDER — FENTANYL CITRATE 50 UG/ML
INJECTION, SOLUTION INTRAMUSCULAR; INTRAVENOUS AS NEEDED
Status: DISCONTINUED | OUTPATIENT
Start: 2019-06-27 | End: 2019-06-27 | Stop reason: SURG

## 2019-06-27 RX ORDER — DEXAMETHASONE SODIUM PHOSPHATE 4 MG/ML
INJECTION, SOLUTION INTRA-ARTICULAR; INTRALESIONAL; INTRAMUSCULAR; INTRAVENOUS; SOFT TISSUE AS NEEDED
Status: DISCONTINUED | OUTPATIENT
Start: 2019-06-27 | End: 2019-06-27 | Stop reason: SURG

## 2019-06-27 RX ORDER — PROPOFOL 10 MG/ML
INJECTION, EMULSION INTRAVENOUS AS NEEDED
Status: DISCONTINUED | OUTPATIENT
Start: 2019-06-27 | End: 2019-06-27 | Stop reason: SURG

## 2019-06-27 RX ORDER — MEPERIDINE HYDROCHLORIDE 25 MG/ML
12.5 INJECTION INTRAMUSCULAR; INTRAVENOUS; SUBCUTANEOUS
Status: DISCONTINUED | OUTPATIENT
Start: 2019-06-27 | End: 2019-06-27 | Stop reason: HOSPADM

## 2019-06-27 RX ORDER — PROMETHAZINE HYDROCHLORIDE 25 MG/1
25 SUPPOSITORY RECTAL ONCE AS NEEDED
Status: DISCONTINUED | OUTPATIENT
Start: 2019-06-27 | End: 2019-06-27 | Stop reason: HOSPADM

## 2019-06-27 RX ORDER — ONDANSETRON 2 MG/ML
INJECTION INTRAMUSCULAR; INTRAVENOUS AS NEEDED
Status: DISCONTINUED | OUTPATIENT
Start: 2019-06-27 | End: 2019-06-27 | Stop reason: SURG

## 2019-06-27 RX ORDER — PROMETHAZINE HYDROCHLORIDE 25 MG/1
25 TABLET ORAL ONCE AS NEEDED
Status: DISCONTINUED | OUTPATIENT
Start: 2019-06-27 | End: 2019-06-27 | Stop reason: HOSPADM

## 2019-06-27 RX ORDER — SUCCINYLCHOLINE CHLORIDE 20 MG/ML
INJECTION INTRAMUSCULAR; INTRAVENOUS AS NEEDED
Status: DISCONTINUED | OUTPATIENT
Start: 2019-06-27 | End: 2019-06-27 | Stop reason: SURG

## 2019-06-27 RX ORDER — IPRATROPIUM BROMIDE AND ALBUTEROL SULFATE 2.5; .5 MG/3ML; MG/3ML
3 SOLUTION RESPIRATORY (INHALATION) ONCE
Status: COMPLETED | OUTPATIENT
Start: 2019-06-27 | End: 2019-06-27

## 2019-06-27 RX ORDER — SODIUM CHLORIDE, SODIUM LACTATE, POTASSIUM CHLORIDE, CALCIUM CHLORIDE 600; 310; 30; 20 MG/100ML; MG/100ML; MG/100ML; MG/100ML
1000 INJECTION, SOLUTION INTRAVENOUS CONTINUOUS
Status: CANCELLED | OUTPATIENT
Start: 2019-06-27

## 2019-06-27 RX ORDER — PROMETHAZINE HYDROCHLORIDE 25 MG/ML
6.25 INJECTION, SOLUTION INTRAMUSCULAR; INTRAVENOUS ONCE AS NEEDED
Status: DISCONTINUED | OUTPATIENT
Start: 2019-06-27 | End: 2019-06-27 | Stop reason: HOSPADM

## 2019-06-27 RX ORDER — SCOLOPAMINE TRANSDERMAL SYSTEM 1 MG/1
1 PATCH, EXTENDED RELEASE TRANSDERMAL ONCE
Status: DISCONTINUED | OUTPATIENT
Start: 2019-06-27 | End: 2019-06-27 | Stop reason: HOSPADM

## 2019-06-27 RX ORDER — LIDOCAINE HYDROCHLORIDE 20 MG/ML
INJECTION, SOLUTION INFILTRATION; PERINEURAL AS NEEDED
Status: DISCONTINUED | OUTPATIENT
Start: 2019-06-27 | End: 2019-06-27 | Stop reason: HOSPADM

## 2019-06-27 RX ORDER — ROCURONIUM BROMIDE 10 MG/ML
INJECTION, SOLUTION INTRAVENOUS AS NEEDED
Status: DISCONTINUED | OUTPATIENT
Start: 2019-06-27 | End: 2019-06-27 | Stop reason: SURG

## 2019-06-27 RX ORDER — SODIUM CHLORIDE 9 MG/ML
42 INJECTION, SOLUTION INTRAVENOUS CONTINUOUS
Status: DISCONTINUED | OUTPATIENT
Start: 2019-06-27 | End: 2019-06-27 | Stop reason: HOSPADM

## 2019-06-27 RX ORDER — MIDAZOLAM HYDROCHLORIDE 1 MG/ML
INJECTION INTRAMUSCULAR; INTRAVENOUS AS NEEDED
Status: DISCONTINUED | OUTPATIENT
Start: 2019-06-27 | End: 2019-06-27 | Stop reason: SURG

## 2019-06-27 RX ADMIN — IPRATROPIUM BROMIDE AND ALBUTEROL SULFATE 3 ML: .5; 3 SOLUTION RESPIRATORY (INHALATION) at 12:32

## 2019-06-27 RX ADMIN — PROPOFOL 200 MG: 10 INJECTION, EMULSION INTRAVENOUS at 13:15

## 2019-06-27 RX ADMIN — SUCCINYLCHOLINE CHLORIDE 174.2 MG: 20 INJECTION, SOLUTION INTRAMUSCULAR; INTRAVENOUS at 13:15

## 2019-06-27 RX ADMIN — SCOPALAMINE 1 PATCH: 1 PATCH, EXTENDED RELEASE TRANSDERMAL at 12:37

## 2019-06-27 RX ADMIN — ONDANSETRON 4 MG: 2 INJECTION INTRAMUSCULAR; INTRAVENOUS at 13:15

## 2019-06-27 RX ADMIN — FENTANYL CITRATE 100 MCG: 50 INJECTION, SOLUTION INTRAMUSCULAR; INTRAVENOUS at 13:15

## 2019-06-27 RX ADMIN — MIDAZOLAM HYDROCHLORIDE 2 MG: 1 INJECTION, SOLUTION INTRAMUSCULAR; INTRAVENOUS at 13:15

## 2019-06-27 RX ADMIN — SODIUM CHLORIDE 42 ML/HR: 9 INJECTION, SOLUTION INTRAVENOUS at 12:37

## 2019-06-27 RX ADMIN — DEXAMETHASONE SODIUM PHOSPHATE 8 MG: 4 INJECTION, SOLUTION INTRAMUSCULAR; INTRAVENOUS at 13:15

## 2019-06-27 RX ADMIN — ROCURONIUM BROMIDE 10 MG: 10 INJECTION INTRAVENOUS at 13:15

## 2019-06-27 NOTE — ANESTHESIA PROCEDURE NOTES
Airway  Urgency: elective    Airway not difficult    General Information and Staff    Patient location during procedure: OR  CRNA: Ernie Johnson CRNA    Indications and Patient Condition  Indications for airway management: airway protection    Preoxygenated: yes  Mask difficulty assessment: 1 - vent by mask    Final Airway Details  Final airway type: endotracheal airway      Successful airway: ETT  Cuffed: yes   Successful intubation technique: direct laryngoscopy  Facilitating devices/methods: intubating stylet  Endotracheal tube insertion site: oral  Blade: Baptiste  Blade size: 2  ETT size (mm): 9.0  Cormack-Lehane Classification: grade I - full view of glottis  Placement verified by: chest auscultation and capnometry   Cuff volume (mL): 10  Measured from: lips  ETT to lips (cm): 22  Number of attempts at approach: 1    Additional Comments  Airway placed without problems. Dentition and Lips as pre-induction. ETT cuff inflated to minimal occlusive pressure.

## 2019-06-27 NOTE — ANESTHESIA POSTPROCEDURE EVALUATION
Patient: Angi Marinelli    Procedure Summary     Date:  06/27/19 Room / Location:  [facility] /  [facility] OR    Anesthesia Start:  1312 Anesthesia Stop:  1430    Procedure:  BRONCHOSCOPY WITH ENDOBRONCHIAL ULTRASOUND (N/A Bronchus) Diagnosis:       Abnormal CT of the chest      Lung nodule < 6cm on CT      Mediastinal lymphadenopathy      (Abnormal CT of the chest [R93.89])      (Lung nodule < 6cm on CT [R91.1])      (Mediastinal lymphadenopathy [R59.0])    Surgeon:  Iggy Beard MD Provider:  Ernie Johnson CRNA    Anesthesia Type:  general ASA Status:  3          Anesthesia Type: general  Last vitals  BP   108/67 (06/27/19 1530)   Temp   97.4 °F (36.3 °C) (06/27/19 1530)   Pulse   68 (06/27/19 1530)   Resp   16 (06/27/19 1530)     SpO2   95 % (06/27/19 1530)     Post Anesthesia Care and Evaluation    Patient location during evaluation: PACU  Patient participation: complete - patient participated  Level of consciousness: awake and alert and awake  Pain score: 3  Pain management: adequate  Airway patency: patent  Anesthetic complications: No anesthetic complications  PONV Status: controlled  Cardiovascular status: acceptable, hemodynamically stable and stable  Respiratory status: acceptable and nasal cannula  Hydration status: acceptable

## 2019-06-27 NOTE — ANESTHESIA PREPROCEDURE EVALUATION
Anesthesia Evaluation     Patient summary reviewed and Nursing notes reviewed   history of anesthetic complications: PONV  NPO Solid Status: > 8 hours  NPO Liquid Status: > 8 hours           Airway   Mallampati: II  TM distance: >3 FB  Neck ROM: full  No difficulty expected  Dental    (+) lower dentures    Pulmonary    (+) a smoker Current, COPD, shortness of breath, decreased breath sounds,     ROS comment: Lung nodule and mediastinal lymphadenopathy  Cardiovascular     (+) hypertension, TRIPP, PVD, hyperlipidemia,       Neuro/Psych  (+) headaches,     GI/Hepatic/Renal/Endo    (+)  GERD,      Musculoskeletal     (+) arthralgias, chronic pain, myalgias,   Abdominal    Substance History      OB/GYN          Other   (+) arthritis                   Anesthesia Plan    ASA 3     general   (Risks and benefits discussed including risk of aspiration, recall and dental damage. All patient questions answered. Will continue with POC.)  intravenous induction   Anesthetic plan, all risks, benefits, and alternatives have been provided, discussed and informed consent has been obtained with: patient.

## 2019-07-02 LAB
BACTERIA SPEC RESP CULT: ABNORMAL
GRAM STN SPEC: ABNORMAL

## 2019-07-02 RX ORDER — LEVOFLOXACIN 500 MG/1
500 TABLET, FILM COATED ORAL DAILY
Qty: 5 TABLET | Refills: 0 | Status: SHIPPED | OUTPATIENT
Start: 2019-07-02 | End: 2019-07-07

## 2019-07-08 ENCOUNTER — OFFICE VISIT (OUTPATIENT)
Dept: PULMONOLOGY | Facility: CLINIC | Age: 74
End: 2019-07-08

## 2019-07-08 VITALS
OXYGEN SATURATION: 96 % | HEIGHT: 72 IN | HEART RATE: 81 BPM | RESPIRATION RATE: 18 BRPM | SYSTOLIC BLOOD PRESSURE: 110 MMHG | BODY MASS INDEX: 25.06 KG/M2 | WEIGHT: 185 LBS | DIASTOLIC BLOOD PRESSURE: 60 MMHG

## 2019-07-08 DIAGNOSIS — C34.91 ADENOCARCINOMA OF LUNG, RIGHT (HCC): Primary | ICD-10-CM

## 2019-07-08 DIAGNOSIS — F17.200 SMOKING: ICD-10-CM

## 2019-07-08 DIAGNOSIS — R06.02 SOB (SHORTNESS OF BREATH): ICD-10-CM

## 2019-07-08 DIAGNOSIS — J44.9 CHRONIC OBSTRUCTIVE PULMONARY DISEASE, UNSPECIFIED COPD TYPE (HCC): ICD-10-CM

## 2019-07-08 PROCEDURE — 99214 OFFICE O/P EST MOD 30 MIN: CPT | Performed by: INTERNAL MEDICINE

## 2019-07-08 RX ORDER — LANOLIN ALCOHOL/MO/W.PET/CERES
CREAM (GRAM) TOPICAL
Refills: 2 | COMMUNITY
Start: 2019-06-22 | End: 2021-03-31

## 2019-07-08 RX ORDER — SIMVASTATIN 40 MG
40 TABLET ORAL EVERY EVENING
Refills: 2 | COMMUNITY
Start: 2019-07-01

## 2019-07-08 NOTE — PROGRESS NOTES
"Chief Complaint   Patient presents with   • Follow-up     Abnormal CT of the chest    • Shortness of Breath       Subjective   Angi Marinelli is a 74 y.o. male.     History of Present Illness   Patient comes in today to follow-up on the results of bronchoscopy.  The patient actually has been using Symbicort but says that it causes him to have palpitations and he does not \"like it as much\".    The patient continues to smoke although is trying to cut back.    I discussed the results of biopsy with the patient.    The following portions of the patient's history were reviewed and updated as appropriate: allergies, current medications, past family history, past medical history, past social history and past surgical history.    Review of Systems   Constitutional: Negative for chills and fever.   HENT: Negative for sinus pressure and sneezing.    Respiratory: Negative for cough, shortness of breath and wheezing.        Objective   Visit Vitals  /60   Pulse 81   Resp 18   Ht 182.9 cm (72.01\")   Wt 83.9 kg (185 lb)   SpO2 96%   BMI 25.08 kg/m²       Physical Exam   Constitutional: He is oriented to person, place, and time. He appears well-developed and well-nourished.   Eyes: EOM are normal.   Neck: Neck supple.   Cardiovascular: Normal rate and regular rhythm.   Pulmonary/Chest: Effort normal. No respiratory distress.   Somewhat hyperresonant to percussion  Somewhat decreased A/E with out wheezing noted.   Musculoskeletal: He exhibits no edema.   Neurological: He is alert and oriented to person, place, and time.   Skin: Skin is warm and dry.   Psychiatric: He has a normal mood and affect.   Vitals reviewed.      Assessment/Plan   Angi was seen today for follow-up and shortness of breath.    Diagnoses and all orders for this visit:    Adenocarcinoma of lung, right (CMS/HCC)    Smoking    Chronic obstructive pulmonary disease, unspecified COPD type (CMS/HCC)    SOB (shortness of breath)    Other orders  -     " tiotropium bromide monohydrate (SPIRIVA RESPIMAT) 2.5 MCG/ACT aerosol solution inhaler; Inhale 2 puffs Daily.           Return in about 6 weeks (around 8/19/2019) for Recheck, Overbook.    DISCUSSION (if any):  I reviewed the pathology results with the patient.  I told the patient and his family member that the biopsy results were consistent with poorly differentiated adenocarcinoma.    I told the patient that it is imperative that he follows with oncology as there was a mention of liver lesion and he may need further work-up including possible liver biopsy before any conclusion can be drawn with regards to the staging of the lung cancer.      I told the patient that if the liver lesion was nonmalignant then he may be a candidate for resection of the lung nodule which is adenocarcinoma.      I also informed the patient that he will need a PET scan before any assessment can be made, with regards to appropriateness for surgery.    I told the patient's family member to make sure that he has an appointment with Dr. Liddle as soon as possible.  She is the patient's oncologist and I will also ask my office staff to try to contact her office and schedule this patient sooner.    The patient is reporting some palpitations with Symbicort, which is likely secondary to the beta agonist component therefore I started him on Spiriva.      Patient was advised to continue using rescue inhaler for when necessary purposes    Patient was also advised to keep a log of the use of rescue inhaler.      I spent a total of 30 minutes face to face with this patient. his pertinent current and previous data, as applicable, were reviewed. Patient's diagnostic studies were also reviewed. More than 15 minutes of the time spent, was spent in counseling about patient's underlying disease process, reviewing and discussing available radiological and laboratory data, staging work-up, medication compliance (as applicable) and lifestyle modifications  that may impact patient's health.     Dictated utilizing Dragon dictation.    This document was electronically signed by Iggy Beard MD on 07/08/19 at 2:53 PM

## 2019-07-09 LAB
LAB AP CASE REPORT: NORMAL
LAB AP CLINICAL INFORMATION: NORMAL
PATH REPORT.FINAL DX SPEC: NORMAL

## 2019-07-22 DIAGNOSIS — C34.91 ADENOCARCINOMA OF LUNG, RIGHT (HCC): Primary | ICD-10-CM

## 2019-07-30 ENCOUNTER — OFFICE VISIT (OUTPATIENT)
Dept: CARDIAC SURGERY | Facility: CLINIC | Age: 74
End: 2019-07-30

## 2019-07-30 ENCOUNTER — PREP FOR SURGERY (OUTPATIENT)
Dept: OTHER | Facility: HOSPITAL | Age: 74
End: 2019-07-30

## 2019-07-30 VITALS
SYSTOLIC BLOOD PRESSURE: 120 MMHG | WEIGHT: 187 LBS | TEMPERATURE: 97.9 F | OXYGEN SATURATION: 99 % | DIASTOLIC BLOOD PRESSURE: 70 MMHG | BODY MASS INDEX: 25.33 KG/M2 | HEART RATE: 66 BPM | HEIGHT: 72 IN

## 2019-07-30 DIAGNOSIS — C34.31 MALIGNANT NEOPLASM OF LOWER LOBE OF RIGHT LUNG (HCC): Primary | ICD-10-CM

## 2019-07-30 DIAGNOSIS — Z00.6 EXAMINATION FOR NORMAL COMPARISON FOR CLINICAL RESEARCH: Primary | ICD-10-CM

## 2019-07-30 PROCEDURE — 99204 OFFICE O/P NEW MOD 45 MIN: CPT | Performed by: THORACIC SURGERY (CARDIOTHORACIC VASCULAR SURGERY)

## 2019-07-30 PROCEDURE — 99406 BEHAV CHNG SMOKING 3-10 MIN: CPT | Performed by: THORACIC SURGERY (CARDIOTHORACIC VASCULAR SURGERY)

## 2019-07-30 RX ORDER — CHLORHEXIDINE GLUCONATE 500 MG/1
1 CLOTH TOPICAL EVERY 12 HOURS PRN
Status: CANCELLED | OUTPATIENT
Start: 2019-08-21

## 2019-08-01 LAB
BACTERIA SPEC AEROBE CULT: NORMAL
FUNGUS SPEC CULT: NORMAL
FUNGUS SPEC CULT: NORMAL
FUNGUS SPEC FUNGUS STN: NORMAL

## 2019-08-02 LAB
LAB AP CASE REPORT: NORMAL
LAB AP CLINICAL INFORMATION: NORMAL
PATH REPORT.ADDENDUM SPEC: NORMAL
PATH REPORT.FINAL DX SPEC: NORMAL

## 2019-08-08 LAB
ACID FAST STN SPEC: NEGATIVE
BACTERIA SPEC AEROBE CULT: NEGATIVE
SPECIMEN PREPARATION: NORMAL

## 2019-08-21 ENCOUNTER — ANESTHESIA EVENT (OUTPATIENT)
Dept: PERIOP | Facility: HOSPITAL | Age: 74
End: 2019-08-21

## 2019-08-21 ENCOUNTER — HOSPITAL ENCOUNTER (OUTPATIENT)
Dept: PULMONOLOGY | Facility: HOSPITAL | Age: 74
Discharge: HOME | End: 2019-08-21

## 2019-08-21 ENCOUNTER — APPOINTMENT (OUTPATIENT)
Dept: PREADMISSION TESTING | Facility: HOSPITAL | Age: 74
End: 2019-08-21

## 2019-08-21 ENCOUNTER — HOSPITAL ENCOUNTER (OUTPATIENT)
Dept: GENERAL RADIOLOGY | Facility: HOSPITAL | Age: 74
Discharge: HOME | End: 2019-08-21
Admitting: PHYSICIAN ASSISTANT

## 2019-08-21 VITALS — BODY MASS INDEX: 25.23 KG/M2 | WEIGHT: 186.29 LBS | HEIGHT: 72 IN | OXYGEN SATURATION: 97 %

## 2019-08-21 DIAGNOSIS — C34.31 MALIGNANT NEOPLASM OF LOWER LOBE OF RIGHT LUNG (HCC): ICD-10-CM

## 2019-08-21 LAB
ABO GROUP BLD: NORMAL
ALBUMIN SERPL-MCNC: 4.3 G/DL (ref 3.5–5.2)
ALP SERPL-CCNC: 80 U/L (ref 39–117)
ALT SERPL W P-5'-P-CCNC: 9 U/L (ref 1–41)
ANION GAP SERPL CALCULATED.3IONS-SCNC: 10 MMOL/L (ref 5–15)
AST SERPL-CCNC: 13 U/L (ref 1–40)
BASOPHILS # BLD AUTO: 0.07 10*3/MM3 (ref 0–0.2)
BASOPHILS NFR BLD AUTO: 0.7 % (ref 0–1.5)
BILIRUB CONJ SERPL-MCNC: <0.2 MG/DL (ref 0.2–0.3)
BILIRUB INDIRECT SERPL-MCNC: ABNORMAL MG/DL
BILIRUB SERPL-MCNC: 0.2 MG/DL (ref 0.2–1.2)
BLD GP AB SCN SERPL QL: NEGATIVE
BUN BLD-MCNC: 16 MG/DL (ref 8–23)
BUN/CREAT SERPL: 13.1 (ref 7–25)
CALCIUM SPEC-SCNC: 10 MG/DL (ref 8.6–10.5)
CHLORIDE SERPL-SCNC: 109 MMOL/L (ref 98–107)
CO2 SERPL-SCNC: 24 MMOL/L (ref 22–29)
CREAT BLD-MCNC: 1.22 MG/DL (ref 0.76–1.27)
DEPRECATED RDW RBC AUTO: 49.8 FL (ref 37–54)
EOSINOPHIL # BLD AUTO: 0.15 10*3/MM3 (ref 0–0.4)
EOSINOPHIL NFR BLD AUTO: 1.5 % (ref 0.3–6.2)
ERYTHROCYTE [DISTWIDTH] IN BLOOD BY AUTOMATED COUNT: 14.8 % (ref 12.3–15.4)
GFR SERPL CREATININE-BSD FRML MDRD: 58 ML/MIN/1.73
GLUCOSE BLD-MCNC: 90 MG/DL (ref 65–99)
HBA1C MFR BLD: 6.1 % (ref 4.8–5.6)
HCT VFR BLD AUTO: 41.4 % (ref 37.5–51)
HGB BLD-MCNC: 12.9 G/DL (ref 13–17.7)
IMM GRANULOCYTES # BLD AUTO: 0.03 10*3/MM3 (ref 0–0.05)
IMM GRANULOCYTES NFR BLD AUTO: 0.3 % (ref 0–0.5)
INR PPP: 1.03 (ref 0.85–1.16)
LYMPHOCYTES # BLD AUTO: 2.25 10*3/MM3 (ref 0.7–3.1)
LYMPHOCYTES NFR BLD AUTO: 23 % (ref 19.6–45.3)
MCH RBC QN AUTO: 28.3 PG (ref 26.6–33)
MCHC RBC AUTO-ENTMCNC: 31.2 G/DL (ref 31.5–35.7)
MCV RBC AUTO: 90.8 FL (ref 79–97)
MONOCYTES # BLD AUTO: 0.74 10*3/MM3 (ref 0.1–0.9)
MONOCYTES NFR BLD AUTO: 7.6 % (ref 5–12)
NEUTROPHILS # BLD AUTO: 6.55 10*3/MM3 (ref 1.7–7)
NEUTROPHILS NFR BLD AUTO: 66.9 % (ref 42.7–76)
NRBC BLD AUTO-RTO: 0 /100 WBC (ref 0–0.2)
PLATELET # BLD AUTO: 234 10*3/MM3 (ref 140–450)
PMV BLD AUTO: 9.9 FL (ref 6–12)
POTASSIUM BLD-SCNC: 4.3 MMOL/L (ref 3.5–5.2)
PROT SERPL-MCNC: 6.8 G/DL (ref 6–8.5)
PROTHROMBIN TIME: 13 SECONDS (ref 11.2–14.3)
RBC # BLD AUTO: 4.56 10*6/MM3 (ref 4.14–5.8)
RH BLD: POSITIVE
SODIUM BLD-SCNC: 143 MMOL/L (ref 136–145)
T&S EXPIRATION DATE: NORMAL
WBC NRBC COR # BLD: 9.79 10*3/MM3 (ref 3.4–10.8)

## 2019-08-21 PROCEDURE — 71046 X-RAY EXAM CHEST 2 VIEWS: CPT

## 2019-08-21 PROCEDURE — 36415 COLL VENOUS BLD VENIPUNCTURE: CPT

## 2019-08-21 PROCEDURE — 85025 COMPLETE CBC W/AUTO DIFF WBC: CPT | Performed by: PHYSICIAN ASSISTANT

## 2019-08-21 PROCEDURE — 86920 COMPATIBILITY TEST SPIN: CPT

## 2019-08-21 PROCEDURE — 86850 RBC ANTIBODY SCREEN: CPT | Performed by: PHYSICIAN ASSISTANT

## 2019-08-21 PROCEDURE — 80048 BASIC METABOLIC PNL TOTAL CA: CPT | Performed by: PHYSICIAN ASSISTANT

## 2019-08-21 PROCEDURE — 85610 PROTHROMBIN TIME: CPT | Performed by: PHYSICIAN ASSISTANT

## 2019-08-21 PROCEDURE — 83036 HEMOGLOBIN GLYCOSYLATED A1C: CPT | Performed by: PHYSICIAN ASSISTANT

## 2019-08-21 PROCEDURE — 94010 BREATHING CAPACITY TEST: CPT

## 2019-08-21 PROCEDURE — 80076 HEPATIC FUNCTION PANEL: CPT | Performed by: PHYSICIAN ASSISTANT

## 2019-08-21 PROCEDURE — 86900 BLOOD TYPING SEROLOGIC ABO: CPT | Performed by: PHYSICIAN ASSISTANT

## 2019-08-21 PROCEDURE — 94010 BREATHING CAPACITY TEST: CPT | Performed by: INTERNAL MEDICINE

## 2019-08-21 PROCEDURE — 86901 BLOOD TYPING SEROLOGIC RH(D): CPT | Performed by: PHYSICIAN ASSISTANT

## 2019-08-21 RX ORDER — FAMOTIDINE 10 MG/ML
20 INJECTION, SOLUTION INTRAVENOUS ONCE
Status: CANCELLED | OUTPATIENT
Start: 2019-08-21 | End: 2019-08-21

## 2019-08-21 RX ORDER — CHLORHEXIDINE GLUCONATE 500 MG/1
1 CLOTH TOPICAL EVERY 12 HOURS PRN
Status: DISCONTINUED | OUTPATIENT
Start: 2019-08-21 | End: 2019-08-23

## 2019-08-21 RX ORDER — ASPIRIN 325 MG
325 TABLET ORAL DAILY
COMMUNITY

## 2019-08-22 ENCOUNTER — APPOINTMENT (OUTPATIENT)
Dept: GENERAL RADIOLOGY | Facility: HOSPITAL | Age: 74
End: 2019-08-22

## 2019-08-22 ENCOUNTER — ANESTHESIA (OUTPATIENT)
Dept: PERIOP | Facility: HOSPITAL | Age: 74
End: 2019-08-22

## 2019-08-22 ENCOUNTER — HOSPITAL ENCOUNTER (INPATIENT)
Facility: HOSPITAL | Age: 74
LOS: 8 days | Discharge: NURSING, REHAB OR LONG-TERM CARE | End: 2019-08-30
Attending: THORACIC SURGERY (CARDIOTHORACIC VASCULAR SURGERY) | Admitting: THORACIC SURGERY (CARDIOTHORACIC VASCULAR SURGERY)

## 2019-08-22 DIAGNOSIS — C34.31 MALIGNANT NEOPLASM OF LOWER LOBE OF RIGHT LUNG (HCC): ICD-10-CM

## 2019-08-22 PROBLEM — E78.5 DYSLIPIDEMIA: Status: ACTIVE | Noted: 2019-08-22

## 2019-08-22 PROBLEM — G25.81 RESTLESS LEG SYNDROME: Status: ACTIVE | Noted: 2019-08-22

## 2019-08-22 PROBLEM — I10 HYPERTENSION: Status: ACTIVE | Noted: 2019-08-22

## 2019-08-22 PROBLEM — Z99.81 ON HOME OXYGEN THERAPY: Status: ACTIVE | Noted: 2019-08-22

## 2019-08-22 PROBLEM — F17.200 SMOKER: Status: ACTIVE | Noted: 2019-08-22

## 2019-08-22 PROBLEM — C73 THYROID CANCER: Status: ACTIVE | Noted: 2019-08-22

## 2019-08-22 PROBLEM — J44.9 COPD (CHRONIC OBSTRUCTIVE PULMONARY DISEASE) (HCC): Status: ACTIVE | Noted: 2019-08-22

## 2019-08-22 PROCEDURE — 25010000002 DEXAMETHASONE PER 1 MG: Performed by: NURSE ANESTHETIST, CERTIFIED REGISTERED

## 2019-08-22 PROCEDURE — 25010000002 FENTANYL CITRATE (PF) 100 MCG/2ML SOLUTION: Performed by: NURSE ANESTHETIST, CERTIFIED REGISTERED

## 2019-08-22 PROCEDURE — 25010000002 HYDROMORPHONE PER 4 MG: Performed by: NURSE ANESTHETIST, CERTIFIED REGISTERED

## 2019-08-22 PROCEDURE — 32663 THORACOSCOPY W/LOBECTOMY: CPT | Performed by: THORACIC SURGERY (CARDIOTHORACIC VASCULAR SURGERY)

## 2019-08-22 PROCEDURE — 88305 TISSUE EXAM BY PATHOLOGIST: CPT | Performed by: THORACIC SURGERY (CARDIOTHORACIC VASCULAR SURGERY)

## 2019-08-22 PROCEDURE — 88309 TISSUE EXAM BY PATHOLOGIST: CPT | Performed by: THORACIC SURGERY (CARDIOTHORACIC VASCULAR SURGERY)

## 2019-08-22 PROCEDURE — 32674 THORACOSCOPY LYMPH NODE EXC: CPT | Performed by: THORACIC SURGERY (CARDIOTHORACIC VASCULAR SURGERY)

## 2019-08-22 PROCEDURE — 25010000002 PROPOFOL 10 MG/ML EMULSION: Performed by: NURSE ANESTHETIST, CERTIFIED REGISTERED

## 2019-08-22 PROCEDURE — 99233 SBSQ HOSP IP/OBS HIGH 50: CPT | Performed by: INTERNAL MEDICINE

## 2019-08-22 PROCEDURE — 31622 DX BRONCHOSCOPE/WASH: CPT | Performed by: THORACIC SURGERY (CARDIOTHORACIC VASCULAR SURGERY)

## 2019-08-22 PROCEDURE — 25010000002 KETOROLAC TROMETHAMINE PER 15 MG: Performed by: INTERNAL MEDICINE

## 2019-08-22 PROCEDURE — 3E0T3BZ INTRODUCTION OF ANESTHETIC AGENT INTO PERIPHERAL NERVES AND PLEXI, PERCUTANEOUS APPROACH: ICD-10-PCS | Performed by: THORACIC SURGERY (CARDIOTHORACIC VASCULAR SURGERY)

## 2019-08-22 PROCEDURE — 25010000002 VANCOMYCIN 10 G RECONSTITUTED SOLUTION: Performed by: PHYSICIAN ASSISTANT

## 2019-08-22 PROCEDURE — 25010000002 NEOSTIGMINE 10 MG/10ML SOLUTION: Performed by: ANESTHESIOLOGY

## 2019-08-22 PROCEDURE — C2618 PROBE/NEEDLE, CRYO: HCPCS | Performed by: THORACIC SURGERY (CARDIOTHORACIC VASCULAR SURGERY)

## 2019-08-22 PROCEDURE — 01584ZZ DESTRUCTION OF THORACIC NERVE, PERCUTANEOUS ENDOSCOPIC APPROACH: ICD-10-PCS | Performed by: THORACIC SURGERY (CARDIOTHORACIC VASCULAR SURGERY)

## 2019-08-22 PROCEDURE — 07T74ZZ RESECTION OF THORAX LYMPHATIC, PERCUTANEOUS ENDOSCOPIC APPROACH: ICD-10-PCS | Performed by: THORACIC SURGERY (CARDIOTHORACIC VASCULAR SURGERY)

## 2019-08-22 PROCEDURE — 88331 PATH CONSLTJ SURG 1 BLK 1SPC: CPT | Performed by: PATHOLOGY

## 2019-08-22 PROCEDURE — 88334 PATH CONSLTJ SURG CYTO XM EA: CPT | Performed by: THORACIC SURGERY (CARDIOTHORACIC VASCULAR SURGERY)

## 2019-08-22 PROCEDURE — 71045 X-RAY EXAM CHEST 1 VIEW: CPT

## 2019-08-22 PROCEDURE — 25010000002 PHENYLEPHRINE PER 1 ML: Performed by: ANESTHESIOLOGY

## 2019-08-22 PROCEDURE — 25010000002 ONDANSETRON PER 1 MG: Performed by: ANESTHESIOLOGY

## 2019-08-22 PROCEDURE — 0BTC4ZZ RESECTION OF RIGHT UPPER LUNG LOBE, PERCUTANEOUS ENDOSCOPIC APPROACH: ICD-10-PCS | Performed by: THORACIC SURGERY (CARDIOTHORACIC VASCULAR SURGERY)

## 2019-08-22 DEVICE — ARTICULATION RELOAD WITH TRI-STAPLE TECHNOLOGY
Type: IMPLANTABLE DEVICE | Site: LUNG | Status: FUNCTIONAL
Brand: ENDO GIA

## 2019-08-22 DEVICE — ARTICULATING RELOAD WITH TRI-STAPLE TECHNOLOGY
Type: IMPLANTABLE DEVICE | Site: LUNG | Status: FUNCTIONAL
Brand: ENDO GIA

## 2019-08-22 DEVICE — CURVED TIP INTELLIGENT RELOAD AND INTRODUCER
Type: IMPLANTABLE DEVICE | Site: LUNG | Status: FUNCTIONAL
Brand: TRI-STAPLE 2.0

## 2019-08-22 DEVICE — BLACK INTELLIGENT RELOAD
Type: IMPLANTABLE DEVICE | Site: LUNG | Status: FUNCTIONAL
Brand: TRI-STAPLE 2.0

## 2019-08-22 RX ORDER — ONDANSETRON 2 MG/ML
4 INJECTION INTRAMUSCULAR; INTRAVENOUS ONCE AS NEEDED
Status: DISCONTINUED | OUTPATIENT
Start: 2019-08-22 | End: 2019-08-22 | Stop reason: HOSPADM

## 2019-08-22 RX ORDER — SENNA AND DOCUSATE SODIUM 50; 8.6 MG/1; MG/1
2 TABLET, FILM COATED ORAL NIGHTLY
Status: DISCONTINUED | OUTPATIENT
Start: 2019-08-22 | End: 2019-08-30 | Stop reason: HOSPADM

## 2019-08-22 RX ORDER — ONDANSETRON 2 MG/ML
4 INJECTION INTRAMUSCULAR; INTRAVENOUS EVERY 6 HOURS PRN
Status: DISCONTINUED | OUTPATIENT
Start: 2019-08-22 | End: 2019-08-30 | Stop reason: HOSPADM

## 2019-08-22 RX ORDER — MAGNESIUM HYDROXIDE 1200 MG/15ML
LIQUID ORAL AS NEEDED
Status: DISCONTINUED | OUTPATIENT
Start: 2019-08-22 | End: 2019-08-22 | Stop reason: HOSPADM

## 2019-08-22 RX ORDER — ESMOLOL HYDROCHLORIDE 10 MG/ML
INJECTION INTRAVENOUS AS NEEDED
Status: DISCONTINUED | OUTPATIENT
Start: 2019-08-22 | End: 2019-08-22 | Stop reason: SURG

## 2019-08-22 RX ORDER — SODIUM CHLORIDE 0.9 % (FLUSH) 0.9 %
3-10 SYRINGE (ML) INJECTION AS NEEDED
Status: DISCONTINUED | OUTPATIENT
Start: 2019-08-22 | End: 2019-08-22 | Stop reason: HOSPADM

## 2019-08-22 RX ORDER — DEXAMETHASONE SODIUM PHOSPHATE 4 MG/ML
INJECTION, SOLUTION INTRA-ARTICULAR; INTRALESIONAL; INTRAMUSCULAR; INTRAVENOUS; SOFT TISSUE AS NEEDED
Status: DISCONTINUED | OUTPATIENT
Start: 2019-08-22 | End: 2019-08-22 | Stop reason: SURG

## 2019-08-22 RX ORDER — ATORVASTATIN CALCIUM 20 MG/1
20 TABLET, FILM COATED ORAL DAILY
Status: DISCONTINUED | OUTPATIENT
Start: 2019-08-23 | End: 2019-08-30 | Stop reason: HOSPADM

## 2019-08-22 RX ORDER — TOPIRAMATE 25 MG/1
50 TABLET ORAL EVERY 12 HOURS SCHEDULED
Status: DISCONTINUED | OUTPATIENT
Start: 2019-08-22 | End: 2019-08-30 | Stop reason: HOSPADM

## 2019-08-22 RX ORDER — DOCUSATE SODIUM 100 MG/1
100 CAPSULE, LIQUID FILLED ORAL NIGHTLY PRN
Status: DISCONTINUED | OUTPATIENT
Start: 2019-08-22 | End: 2019-08-30 | Stop reason: HOSPADM

## 2019-08-22 RX ORDER — IPRATROPIUM BROMIDE AND ALBUTEROL SULFATE 2.5; .5 MG/3ML; MG/3ML
3 SOLUTION RESPIRATORY (INHALATION) EVERY 4 HOURS PRN
Status: DISCONTINUED | OUTPATIENT
Start: 2019-08-22 | End: 2019-08-30 | Stop reason: HOSPADM

## 2019-08-22 RX ORDER — HYDROCODONE BITARTRATE AND ACETAMINOPHEN 7.5; 325 MG/1; MG/1
1 TABLET ORAL EVERY 6 HOURS PRN
Status: DISCONTINUED | OUTPATIENT
Start: 2019-08-22 | End: 2019-08-30 | Stop reason: HOSPADM

## 2019-08-22 RX ORDER — HYDROMORPHONE HYDROCHLORIDE 1 MG/ML
0.5 INJECTION, SOLUTION INTRAMUSCULAR; INTRAVENOUS; SUBCUTANEOUS
Status: DISCONTINUED | OUTPATIENT
Start: 2019-08-22 | End: 2019-08-22 | Stop reason: HOSPADM

## 2019-08-22 RX ORDER — NEOSTIGMINE METHYLSULFATE 1 MG/ML
INJECTION, SOLUTION INTRAVENOUS AS NEEDED
Status: DISCONTINUED | OUTPATIENT
Start: 2019-08-22 | End: 2019-08-22 | Stop reason: SURG

## 2019-08-22 RX ORDER — IPRATROPIUM BROMIDE AND ALBUTEROL SULFATE 2.5; .5 MG/3ML; MG/3ML
3 SOLUTION RESPIRATORY (INHALATION) EVERY 6 HOURS PRN
Status: DISCONTINUED | OUTPATIENT
Start: 2019-08-22 | End: 2019-08-22

## 2019-08-22 RX ORDER — FAMOTIDINE 20 MG/1
20 TABLET, FILM COATED ORAL ONCE
Status: COMPLETED | OUTPATIENT
Start: 2019-08-22 | End: 2019-08-22

## 2019-08-22 RX ORDER — LIDOCAINE HYDROCHLORIDE 10 MG/ML
INJECTION, SOLUTION EPIDURAL; INFILTRATION; INTRACAUDAL; PERINEURAL AS NEEDED
Status: DISCONTINUED | OUTPATIENT
Start: 2019-08-22 | End: 2019-08-22 | Stop reason: SURG

## 2019-08-22 RX ORDER — HEPARIN SODIUM 5000 [USP'U]/ML
5000 INJECTION, SOLUTION INTRAVENOUS; SUBCUTANEOUS EVERY 12 HOURS SCHEDULED
Status: DISCONTINUED | OUTPATIENT
Start: 2019-08-23 | End: 2019-08-30 | Stop reason: HOSPADM

## 2019-08-22 RX ORDER — SODIUM CHLORIDE 9 MG/ML
30 INJECTION, SOLUTION INTRAVENOUS CONTINUOUS
Status: DISCONTINUED | OUTPATIENT
Start: 2019-08-22 | End: 2019-08-30 | Stop reason: HOSPADM

## 2019-08-22 RX ORDER — MONTELUKAST SODIUM 10 MG/1
10 TABLET ORAL NIGHTLY
Status: DISCONTINUED | OUTPATIENT
Start: 2019-08-22 | End: 2019-08-30 | Stop reason: HOSPADM

## 2019-08-22 RX ORDER — IPRATROPIUM BROMIDE AND ALBUTEROL SULFATE 2.5; .5 MG/3ML; MG/3ML
3 SOLUTION RESPIRATORY (INHALATION)
Status: DISCONTINUED | OUTPATIENT
Start: 2019-08-22 | End: 2019-08-30 | Stop reason: HOSPADM

## 2019-08-22 RX ORDER — SODIUM CHLORIDE, SODIUM LACTATE, POTASSIUM CHLORIDE, CALCIUM CHLORIDE 600; 310; 30; 20 MG/100ML; MG/100ML; MG/100ML; MG/100ML
9 INJECTION, SOLUTION INTRAVENOUS CONTINUOUS
Status: DISCONTINUED | OUTPATIENT
Start: 2019-08-22 | End: 2019-08-26

## 2019-08-22 RX ORDER — MORPHINE SULFATE 2 MG/ML
2 INJECTION, SOLUTION INTRAMUSCULAR; INTRAVENOUS EVERY 4 HOURS PRN
Status: DISCONTINUED | OUTPATIENT
Start: 2019-08-22 | End: 2019-08-27

## 2019-08-22 RX ORDER — BISACODYL 10 MG
10 SUPPOSITORY, RECTAL RECTAL DAILY PRN
Status: DISCONTINUED | OUTPATIENT
Start: 2019-08-22 | End: 2019-08-30 | Stop reason: HOSPADM

## 2019-08-22 RX ORDER — VECURONIUM BROMIDE 1 MG/ML
INJECTION, POWDER, LYOPHILIZED, FOR SOLUTION INTRAVENOUS AS NEEDED
Status: DISCONTINUED | OUTPATIENT
Start: 2019-08-22 | End: 2019-08-22 | Stop reason: SURG

## 2019-08-22 RX ORDER — AMLODIPINE BESYLATE 10 MG/1
10 TABLET ORAL DAILY
Status: DISCONTINUED | OUTPATIENT
Start: 2019-08-23 | End: 2019-08-30 | Stop reason: HOSPADM

## 2019-08-22 RX ORDER — KETOROLAC TROMETHAMINE 30 MG/ML
15 INJECTION, SOLUTION INTRAMUSCULAR; INTRAVENOUS EVERY 6 HOURS PRN
Status: COMPLETED | OUTPATIENT
Start: 2019-08-22 | End: 2019-08-25

## 2019-08-22 RX ORDER — ENALAPRIL MALEATE 10 MG/1
20 TABLET ORAL NIGHTLY
Status: DISCONTINUED | OUTPATIENT
Start: 2019-08-22 | End: 2019-08-30 | Stop reason: HOSPADM

## 2019-08-22 RX ORDER — MELOXICAM 15 MG/1
15 TABLET ORAL ONCE
Status: COMPLETED | OUTPATIENT
Start: 2019-08-22 | End: 2019-08-22

## 2019-08-22 RX ORDER — SODIUM CHLORIDE 0.9 % (FLUSH) 0.9 %
3 SYRINGE (ML) INJECTION EVERY 12 HOURS SCHEDULED
Status: DISCONTINUED | OUTPATIENT
Start: 2019-08-22 | End: 2019-08-22 | Stop reason: HOSPADM

## 2019-08-22 RX ORDER — FAMOTIDINE 20 MG/1
20 TABLET, FILM COATED ORAL
Status: DISCONTINUED | OUTPATIENT
Start: 2019-08-23 | End: 2019-08-24

## 2019-08-22 RX ORDER — FENTANYL CITRATE 50 UG/ML
INJECTION, SOLUTION INTRAMUSCULAR; INTRAVENOUS AS NEEDED
Status: DISCONTINUED | OUTPATIENT
Start: 2019-08-22 | End: 2019-08-22 | Stop reason: SURG

## 2019-08-22 RX ORDER — KETOROLAC TROMETHAMINE 15 MG/ML
15 INJECTION, SOLUTION INTRAMUSCULAR; INTRAVENOUS EVERY 6 HOURS PRN
Status: DISCONTINUED | OUTPATIENT
Start: 2019-08-22 | End: 2019-08-22 | Stop reason: HOSPADM

## 2019-08-22 RX ORDER — ACETAMINOPHEN 500 MG
1000 TABLET ORAL ONCE
Status: COMPLETED | OUTPATIENT
Start: 2019-08-22 | End: 2019-08-22

## 2019-08-22 RX ORDER — NICOTINE 21 MG/24HR
1 PATCH, TRANSDERMAL 24 HOURS TRANSDERMAL EVERY 24 HOURS
Status: DISCONTINUED | OUTPATIENT
Start: 2019-08-22 | End: 2019-08-30 | Stop reason: HOSPADM

## 2019-08-22 RX ORDER — PREGABALIN 75 MG/1
75 CAPSULE ORAL ONCE
Status: COMPLETED | OUTPATIENT
Start: 2019-08-22 | End: 2019-08-22

## 2019-08-22 RX ORDER — ROPINIROLE 0.5 MG/1
1 TABLET, FILM COATED ORAL NIGHTLY
Status: DISCONTINUED | OUTPATIENT
Start: 2019-08-22 | End: 2019-08-30 | Stop reason: HOSPADM

## 2019-08-22 RX ORDER — GLYCOPYRROLATE 0.2 MG/ML
INJECTION INTRAMUSCULAR; INTRAVENOUS AS NEEDED
Status: DISCONTINUED | OUTPATIENT
Start: 2019-08-22 | End: 2019-08-22 | Stop reason: SURG

## 2019-08-22 RX ORDER — ONDANSETRON 4 MG/1
4 TABLET, FILM COATED ORAL EVERY 6 HOURS PRN
Status: DISCONTINUED | OUTPATIENT
Start: 2019-08-22 | End: 2019-08-30 | Stop reason: HOSPADM

## 2019-08-22 RX ORDER — PROPOFOL 10 MG/ML
VIAL (ML) INTRAVENOUS AS NEEDED
Status: DISCONTINUED | OUTPATIENT
Start: 2019-08-22 | End: 2019-08-22 | Stop reason: SURG

## 2019-08-22 RX ORDER — ROFLUMILAST 500 UG/1
500 TABLET ORAL DAILY
Status: DISCONTINUED | OUTPATIENT
Start: 2019-08-23 | End: 2019-08-30 | Stop reason: HOSPADM

## 2019-08-22 RX ORDER — LIDOCAINE HYDROCHLORIDE 10 MG/ML
0.5 INJECTION, SOLUTION EPIDURAL; INFILTRATION; INTRACAUDAL; PERINEURAL ONCE AS NEEDED
Status: COMPLETED | OUTPATIENT
Start: 2019-08-22 | End: 2019-08-22

## 2019-08-22 RX ORDER — ROCURONIUM BROMIDE 10 MG/ML
INJECTION, SOLUTION INTRAVENOUS AS NEEDED
Status: DISCONTINUED | OUTPATIENT
Start: 2019-08-22 | End: 2019-08-22 | Stop reason: SURG

## 2019-08-22 RX ORDER — FENTANYL CITRATE 50 UG/ML
50 INJECTION, SOLUTION INTRAMUSCULAR; INTRAVENOUS
Status: DISCONTINUED | OUTPATIENT
Start: 2019-08-22 | End: 2019-08-22 | Stop reason: HOSPADM

## 2019-08-22 RX ORDER — FINASTERIDE 5 MG/1
5 TABLET, FILM COATED ORAL DAILY
Status: DISCONTINUED | OUTPATIENT
Start: 2019-08-23 | End: 2019-08-30 | Stop reason: HOSPADM

## 2019-08-22 RX ORDER — ONDANSETRON 2 MG/ML
INJECTION INTRAMUSCULAR; INTRAVENOUS AS NEEDED
Status: DISCONTINUED | OUTPATIENT
Start: 2019-08-22 | End: 2019-08-22 | Stop reason: SURG

## 2019-08-22 RX ORDER — METHOCARBAMOL 500 MG/1
500 TABLET, FILM COATED ORAL 2 TIMES DAILY
Status: DISCONTINUED | OUTPATIENT
Start: 2019-08-22 | End: 2019-08-30 | Stop reason: HOSPADM

## 2019-08-22 RX ORDER — ASPIRIN 325 MG
325 TABLET ORAL DAILY
Status: DISCONTINUED | OUTPATIENT
Start: 2019-08-23 | End: 2019-08-30 | Stop reason: HOSPADM

## 2019-08-22 RX ADMIN — PHENYLEPHRINE HYDROCHLORIDE 0.5 MCG/KG/MIN: 10 INJECTION INTRAVENOUS at 18:05

## 2019-08-22 RX ADMIN — DEXAMETHASONE SODIUM PHOSPHATE 8 MG: 4 INJECTION, SOLUTION INTRAMUSCULAR; INTRAVENOUS at 16:54

## 2019-08-22 RX ADMIN — ESMOLOL HYDROCHLORIDE 20 MG: 10 INJECTION, SOLUTION INTRAVENOUS at 18:08

## 2019-08-22 RX ADMIN — KETOROLAC TROMETHAMINE 15 MG: 15 INJECTION, SOLUTION INTRAMUSCULAR; INTRAVENOUS at 21:56

## 2019-08-22 RX ADMIN — GLYCOPYRROLATE 0.4 MG: 0.2 INJECTION, SOLUTION INTRAMUSCULAR; INTRAVENOUS at 20:48

## 2019-08-22 RX ADMIN — NEOSTIGMINE METHYLSULFATE 5 MG: 1 INJECTION, SOLUTION INTRAVENOUS at 20:48

## 2019-08-22 RX ADMIN — SODIUM CHLORIDE, POTASSIUM CHLORIDE, SODIUM LACTATE AND CALCIUM CHLORIDE 9 ML/HR: 600; 310; 30; 20 INJECTION, SOLUTION INTRAVENOUS at 13:20

## 2019-08-22 RX ADMIN — EPHEDRINE SULFATE 10 MG: 50 INJECTION INTRAMUSCULAR; INTRAVENOUS; SUBCUTANEOUS at 17:52

## 2019-08-22 RX ADMIN — FAMOTIDINE 20 MG: 20 TABLET ORAL at 13:20

## 2019-08-22 RX ADMIN — HYDROMORPHONE HYDROCHLORIDE 0.5 MG: 1 INJECTION, SOLUTION INTRAMUSCULAR; INTRAVENOUS; SUBCUTANEOUS at 21:25

## 2019-08-22 RX ADMIN — FENTANYL CITRATE 50 MCG: 50 INJECTION INTRAMUSCULAR; INTRAVENOUS at 21:45

## 2019-08-22 RX ADMIN — PHENYLEPHRINE HYDROCHLORIDE 160 MCG: 10 INJECTION INTRAVENOUS at 20:58

## 2019-08-22 RX ADMIN — FENTANYL CITRATE 50 MCG: 50 INJECTION INTRAMUSCULAR; INTRAVENOUS at 22:00

## 2019-08-22 RX ADMIN — LIDOCAINE HYDROCHLORIDE 0.5 ML: 10 INJECTION, SOLUTION EPIDURAL; INFILTRATION; INTRACAUDAL; PERINEURAL at 13:20

## 2019-08-22 RX ADMIN — MELOXICAM 15 MG: 15 TABLET ORAL at 15:39

## 2019-08-22 RX ADMIN — FENTANYL CITRATE 50 MCG: 50 INJECTION, SOLUTION INTRAMUSCULAR; INTRAVENOUS at 20:45

## 2019-08-22 RX ADMIN — ACETAMINOPHEN 1000 MG: 500 TABLET ORAL at 15:39

## 2019-08-22 RX ADMIN — GLYCOPYRROLATE 0.2 MG: 0.2 INJECTION, SOLUTION INTRAMUSCULAR; INTRAVENOUS at 17:59

## 2019-08-22 RX ADMIN — VECURONIUM BROMIDE 2 MG: 1 INJECTION, POWDER, LYOPHILIZED, FOR SOLUTION INTRAVENOUS at 18:13

## 2019-08-22 RX ADMIN — ONDANSETRON 4 MG: 2 INJECTION INTRAMUSCULAR; INTRAVENOUS at 20:46

## 2019-08-22 RX ADMIN — FENTANYL CITRATE 50 MCG: 50 INJECTION, SOLUTION INTRAMUSCULAR; INTRAVENOUS at 16:47

## 2019-08-22 RX ADMIN — PHENYLEPHRINE HYDROCHLORIDE 80 MCG: 10 INJECTION INTRAVENOUS at 17:44

## 2019-08-22 RX ADMIN — VECURONIUM BROMIDE 2 MG: 1 INJECTION, POWDER, LYOPHILIZED, FOR SOLUTION INTRAVENOUS at 19:11

## 2019-08-22 RX ADMIN — PHENYLEPHRINE HYDROCHLORIDE 80 MCG: 10 INJECTION INTRAVENOUS at 17:42

## 2019-08-22 RX ADMIN — VECURONIUM BROMIDE 1 MG: 1 INJECTION, POWDER, LYOPHILIZED, FOR SOLUTION INTRAVENOUS at 20:12

## 2019-08-22 RX ADMIN — SODIUM CHLORIDE, POTASSIUM CHLORIDE, SODIUM LACTATE AND CALCIUM CHLORIDE: 600; 310; 30; 20 INJECTION, SOLUTION INTRAVENOUS at 20:45

## 2019-08-22 RX ADMIN — EPHEDRINE SULFATE 5 MG: 50 INJECTION INTRAMUSCULAR; INTRAVENOUS; SUBCUTANEOUS at 17:46

## 2019-08-22 RX ADMIN — EPHEDRINE SULFATE 10 MG: 50 INJECTION INTRAMUSCULAR; INTRAVENOUS; SUBCUTANEOUS at 17:58

## 2019-08-22 RX ADMIN — HYDROMORPHONE HYDROCHLORIDE 0.5 MG: 1 INJECTION, SOLUTION INTRAMUSCULAR; INTRAVENOUS; SUBCUTANEOUS at 21:30

## 2019-08-22 RX ADMIN — PHENYLEPHRINE HYDROCHLORIDE 80 MCG: 10 INJECTION INTRAVENOUS at 17:46

## 2019-08-22 RX ADMIN — PROPOFOL 150 MG: 10 INJECTION, EMULSION INTRAVENOUS at 16:47

## 2019-08-22 RX ADMIN — ROCURONIUM BROMIDE 50 MG: 10 SOLUTION INTRAVENOUS at 16:47

## 2019-08-22 RX ADMIN — VANCOMYCIN HYDROCHLORIDE 1250 MG: 10 INJECTION, POWDER, LYOPHILIZED, FOR SOLUTION INTRAVENOUS at 15:38

## 2019-08-22 RX ADMIN — SODIUM CHLORIDE 30 ML/HR: 9 INJECTION, SOLUTION INTRAVENOUS at 22:25

## 2019-08-22 RX ADMIN — LIDOCAINE HYDROCHLORIDE 50 MG: 10 INJECTION, SOLUTION EPIDURAL; INFILTRATION; INTRACAUDAL; PERINEURAL at 16:47

## 2019-08-22 RX ADMIN — PREGABALIN 75 MG: 75 CAPSULE ORAL at 15:39

## 2019-08-22 RX ADMIN — SODIUM CHLORIDE, POTASSIUM CHLORIDE, SODIUM LACTATE AND CALCIUM CHLORIDE: 600; 310; 30; 20 INJECTION, SOLUTION INTRAVENOUS at 16:40

## 2019-08-22 NOTE — ANESTHESIA PREPROCEDURE EVALUATION
Anesthesia Evaluation     Patient summary reviewed and Nursing notes reviewed   no history of anesthetic complications:  NPO Solid Status: > 8 hours  NPO Liquid Status: > 8 hours           Airway   Mallampati: II  TM distance: >3 FB  Neck ROM: full  No difficulty expected  Dental    (+) lower dentures    Pulmonary    (+) a smoker Current, lung cancer, COPD, decreased breath sounds,   Cardiovascular - normal exam    ECG reviewed    (+) hypertension, hyperlipidemia,   (-) valvular problems/murmurs, dysrhythmias, angina      Neuro/Psych  (+) headaches,     GI/Hepatic/Renal/Endo    (+)  GERD,    (-) hepatitis, liver disease, no renal disease, diabetes    Musculoskeletal     Abdominal    Substance History      OB/GYN          Other   (+) arthritis   history of cancer (lung cancer/ h/o thyroid cancer)          Anesthesia Evaluation     Patient summary reviewed and Nursing notes reviewed   history of anesthetic complications: PONV  NPO Solid Status: > 8 hours  NPO Liquid Status: > 8 hours           Airway   Mallampati: II  TM distance: >3 FB  Neck ROM: full  No difficulty expected  Dental    (+) lower dentures    Pulmonary    (+) a smoker Current, COPD, shortness of breath, decreased breath sounds,     ROS comment: Lung nodule and mediastinal lymphadenopathy  Cardiovascular     (+) hypertension, TRIPP, PVD, hyperlipidemia,       Neuro/Psych  (+) headaches,     GI/Hepatic/Renal/Endo    (+)  GERD,      Musculoskeletal     (+) arthralgias, chronic pain, myalgias,   Abdominal    Substance History      OB/GYN          Other   (+) arthritis                   Anesthesia Plan    ASA 3     general   (Risks and benefits discussed including risk of aspiration, recall and dental damage. All patient questions answered. Will continue with POC.)  intravenous induction   Anesthetic plan, all risks, benefits, and alternatives have been provided, discussed and informed consent has been obtained with: patient.             Anesthesia  Plan    ASA 3     general     intravenous induction   Anesthetic plan, all risks, benefits, and alternatives have been provided, discussed and informed consent has been obtained with: patient.    Plan discussed with CRNA.

## 2019-08-22 NOTE — ANESTHESIA PROCEDURE NOTES
Airway  Urgency: elective    Airway not difficult    General Information and Staff    Patient location during procedure: OR  CRNA: Felicia Gonzalez CRNA    Indications and Patient Condition  Indications for airway management: airway protection    Preoxygenated: yes  MILS not maintained throughout  Mask difficulty assessment: 1 - vent by mask    Final Airway Details  Final airway type: endotracheal airway      Successful airway: ETT - double lumen left  Cuffed: yes   Successful intubation technique: direct laryngoscopy  Facilitating devices/methods: intubating stylet  Endotracheal tube insertion site: oral  Blade: Baptiste  Blade size: 2  ETT DL size (fr): 39  Cormack-Lehane Classification: grade I - full view of glottis  Placement verified by: chest auscultation and capnometry   Measured from: lips  ETT to lips (cm): 20  Number of attempts at approach: 1    Additional Comments  Negative epigastric sounds, Breath sound equal bilaterally with symmetric chest rise and fall.  Atraumatic, position verified by bronch

## 2019-08-22 NOTE — ANESTHESIA PREPROCEDURE EVALUATION
Anesthesia Evaluation     Patient summary reviewed and Nursing notes reviewed   no history of anesthetic complications:  NPO Solid Status: > 8 hours  NPO Liquid Status: > 8 hours           Airway   Mallampati: II  TM distance: >3 FB  Neck ROM: full  No difficulty expected  Dental    (+) lower dentures    Pulmonary    (+) a smoker Current, lung cancer, COPD, shortness of breath, decreased breath sounds,     ROS comment: Lung nodule and mediastinal lymphadenopathy  Cardiovascular - normal exam    ECG reviewed    (+) hypertension, TRIPP, PVD, hyperlipidemia,   (-) dysrhythmias, angina      Neuro/Psych  (+) headaches,     GI/Hepatic/Renal/Endo    (+)  GERD,    (-) liver disease, no renal disease, diabetes    Musculoskeletal     (+) arthralgias, chronic pain, myalgias,   Abdominal    Substance History      OB/GYN          Other   (+) arthritis   history of cancer                    Anesthesia Plan    ASA 3     general     intravenous induction   Anesthetic plan, all risks, benefits, and alternatives have been provided, discussed and informed consent has been obtained with: patient.    Plan discussed with CRNA.

## 2019-08-23 ENCOUNTER — APPOINTMENT (OUTPATIENT)
Dept: GENERAL RADIOLOGY | Facility: HOSPITAL | Age: 74
End: 2019-08-23

## 2019-08-23 LAB
ANION GAP SERPL CALCULATED.3IONS-SCNC: 15 MMOL/L (ref 5–15)
BASOPHILS # BLD AUTO: 0.03 10*3/MM3 (ref 0–0.2)
BASOPHILS NFR BLD AUTO: 0.2 % (ref 0–1.5)
BUN BLD-MCNC: 16 MG/DL (ref 8–23)
BUN/CREAT SERPL: 12.8 (ref 7–25)
CALCIUM SPEC-SCNC: 9.1 MG/DL (ref 8.6–10.5)
CHLORIDE SERPL-SCNC: 108 MMOL/L (ref 98–107)
CO2 SERPL-SCNC: 18 MMOL/L (ref 22–29)
CREAT BLD-MCNC: 1.25 MG/DL (ref 0.76–1.27)
DEPRECATED RDW RBC AUTO: 49.9 FL (ref 37–54)
EOSINOPHIL # BLD AUTO: 0.06 10*3/MM3 (ref 0–0.4)
EOSINOPHIL NFR BLD AUTO: 0.3 % (ref 0.3–6.2)
ERYTHROCYTE [DISTWIDTH] IN BLOOD BY AUTOMATED COUNT: 14.9 % (ref 12.3–15.4)
GFR SERPL CREATININE-BSD FRML MDRD: 56 ML/MIN/1.73
GLUCOSE BLD-MCNC: 192 MG/DL (ref 65–99)
HCT VFR BLD AUTO: 38.6 % (ref 37.5–51)
HGB BLD-MCNC: 12 G/DL (ref 13–17.7)
IMM GRANULOCYTES # BLD AUTO: 0.07 10*3/MM3 (ref 0–0.05)
IMM GRANULOCYTES NFR BLD AUTO: 0.4 % (ref 0–0.5)
LYMPHOCYTES # BLD AUTO: 0.55 10*3/MM3 (ref 0.7–3.1)
LYMPHOCYTES NFR BLD AUTO: 2.9 % (ref 19.6–45.3)
MCH RBC QN AUTO: 28.4 PG (ref 26.6–33)
MCHC RBC AUTO-ENTMCNC: 31.1 G/DL (ref 31.5–35.7)
MCV RBC AUTO: 91.5 FL (ref 79–97)
MONOCYTES # BLD AUTO: 0.83 10*3/MM3 (ref 0.1–0.9)
MONOCYTES NFR BLD AUTO: 4.3 % (ref 5–12)
NEUTROPHILS # BLD AUTO: 17.59 10*3/MM3 (ref 1.7–7)
NEUTROPHILS NFR BLD AUTO: 91.9 % (ref 42.7–76)
NRBC BLD AUTO-RTO: 0 /100 WBC (ref 0–0.2)
PLATELET # BLD AUTO: 230 10*3/MM3 (ref 140–450)
PMV BLD AUTO: 10.5 FL (ref 6–12)
POTASSIUM BLD-SCNC: 4.2 MMOL/L (ref 3.5–5.2)
RBC # BLD AUTO: 4.22 10*6/MM3 (ref 4.14–5.8)
SODIUM BLD-SCNC: 141 MMOL/L (ref 136–145)
WBC NRBC COR # BLD: 19.13 10*3/MM3 (ref 3.4–10.8)

## 2019-08-23 PROCEDURE — 71045 X-RAY EXAM CHEST 1 VIEW: CPT

## 2019-08-23 PROCEDURE — 85025 COMPLETE CBC W/AUTO DIFF WBC: CPT | Performed by: PHYSICIAN ASSISTANT

## 2019-08-23 PROCEDURE — 25010000002 MORPHINE PER 10 MG: Performed by: THORACIC SURGERY (CARDIOTHORACIC VASCULAR SURGERY)

## 2019-08-23 PROCEDURE — 94799 UNLISTED PULMONARY SVC/PX: CPT

## 2019-08-23 PROCEDURE — 97162 PT EVAL MOD COMPLEX 30 MIN: CPT

## 2019-08-23 PROCEDURE — 25010000002 VANCOMYCIN 10 G RECONSTITUTED SOLUTION: Performed by: PHYSICIAN ASSISTANT

## 2019-08-23 PROCEDURE — 25010000002 KETOROLAC TROMETHAMINE PER 15 MG: Performed by: INTERNAL MEDICINE

## 2019-08-23 PROCEDURE — 25010000002 HEPARIN (PORCINE) PER 1000 UNITS: Performed by: PHYSICIAN ASSISTANT

## 2019-08-23 PROCEDURE — 99232 SBSQ HOSP IP/OBS MODERATE 35: CPT | Performed by: INTERNAL MEDICINE

## 2019-08-23 PROCEDURE — 80048 BASIC METABOLIC PNL TOTAL CA: CPT | Performed by: PHYSICIAN ASSISTANT

## 2019-08-23 PROCEDURE — 94640 AIRWAY INHALATION TREATMENT: CPT

## 2019-08-23 RX ADMIN — MORPHINE SULFATE 2 MG: 2 INJECTION, SOLUTION INTRAMUSCULAR; INTRAVENOUS at 22:02

## 2019-08-23 RX ADMIN — FAMOTIDINE 20 MG: 20 TABLET ORAL at 18:00

## 2019-08-23 RX ADMIN — AMLODIPINE BESYLATE 10 MG: 10 TABLET ORAL at 08:35

## 2019-08-23 RX ADMIN — METHOCARBAMOL 500 MG: 500 TABLET, FILM COATED ORAL at 21:01

## 2019-08-23 RX ADMIN — ENALAPRIL MALEATE 20 MG: 10 TABLET ORAL at 21:00

## 2019-08-23 RX ADMIN — ROFLUMILAST 500 MCG: 500 TABLET ORAL at 10:14

## 2019-08-23 RX ADMIN — IPRATROPIUM BROMIDE AND ALBUTEROL SULFATE 3 ML: 2.5; .5 SOLUTION RESPIRATORY (INHALATION) at 18:36

## 2019-08-23 RX ADMIN — MORPHINE SULFATE 2 MG: 2 INJECTION, SOLUTION INTRAMUSCULAR; INTRAVENOUS at 03:10

## 2019-08-23 RX ADMIN — HEPARIN SODIUM 5000 UNITS: 5000 INJECTION INTRAVENOUS; SUBCUTANEOUS at 08:36

## 2019-08-23 RX ADMIN — TOPIRAMATE 50 MG: 25 TABLET, FILM COATED ORAL at 20:55

## 2019-08-23 RX ADMIN — HYDROCODONE BITARTRATE AND ACETAMINOPHEN 1 TABLET: 7.5; 325 TABLET ORAL at 20:57

## 2019-08-23 RX ADMIN — HYDROCODONE BITARTRATE AND ACETAMINOPHEN 1 TABLET: 7.5; 325 TABLET ORAL at 13:25

## 2019-08-23 RX ADMIN — FAMOTIDINE 20 MG: 20 TABLET ORAL at 08:35

## 2019-08-23 RX ADMIN — SENNOSIDES, DOCUSATE SODIUM 2 TABLET: 50; 8.6 TABLET, FILM COATED ORAL at 20:55

## 2019-08-23 RX ADMIN — NICOTINE 1 PATCH: 14 PATCH TRANSDERMAL at 08:38

## 2019-08-23 RX ADMIN — MORPHINE SULFATE 2 MG: 2 INJECTION, SOLUTION INTRAMUSCULAR; INTRAVENOUS at 13:44

## 2019-08-23 RX ADMIN — ROPINIROLE 1 MG: 0.5 TABLET, FILM COATED ORAL at 20:57

## 2019-08-23 RX ADMIN — HYDROCODONE BITARTRATE AND ACETAMINOPHEN 1 TABLET: 7.5; 325 TABLET ORAL at 08:36

## 2019-08-23 RX ADMIN — MONTELUKAST SODIUM 10 MG: 10 TABLET, COATED ORAL at 21:00

## 2019-08-23 RX ADMIN — VANCOMYCIN HYDROCHLORIDE 1250 MG: 10 INJECTION, POWDER, LYOPHILIZED, FOR SOLUTION INTRAVENOUS at 05:54

## 2019-08-23 RX ADMIN — TOPIRAMATE 50 MG: 25 TABLET, FILM COATED ORAL at 10:14

## 2019-08-23 RX ADMIN — ATORVASTATIN CALCIUM 20 MG: 20 TABLET, FILM COATED ORAL at 08:35

## 2019-08-23 RX ADMIN — KETOROLAC TROMETHAMINE 15 MG: 30 INJECTION, SOLUTION INTRAMUSCULAR; INTRAVENOUS at 03:16

## 2019-08-23 RX ADMIN — IPRATROPIUM BROMIDE AND ALBUTEROL SULFATE 3 ML: 2.5; .5 SOLUTION RESPIRATORY (INHALATION) at 12:29

## 2019-08-23 RX ADMIN — IPRATROPIUM BROMIDE AND ALBUTEROL SULFATE 3 ML: 2.5; .5 SOLUTION RESPIRATORY (INHALATION) at 07:29

## 2019-08-23 RX ADMIN — METHOCARBAMOL 500 MG: 500 TABLET, FILM COATED ORAL at 10:14

## 2019-08-23 RX ADMIN — MORPHINE SULFATE 2 MG: 2 INJECTION, SOLUTION INTRAMUSCULAR; INTRAVENOUS at 18:06

## 2019-08-23 RX ADMIN — KETOROLAC TROMETHAMINE 15 MG: 30 INJECTION, SOLUTION INTRAMUSCULAR; INTRAVENOUS at 12:06

## 2019-08-23 RX ADMIN — POLYETHYLENE GLYCOL 3350 17 G: 17 POWDER, FOR SOLUTION ORAL at 08:36

## 2019-08-23 RX ADMIN — FINASTERIDE 5 MG: 5 TABLET, FILM COATED ORAL at 08:35

## 2019-08-23 RX ADMIN — ASPIRIN 325 MG ORAL TABLET 325 MG: 325 PILL ORAL at 08:35

## 2019-08-23 RX ADMIN — HEPARIN SODIUM 5000 UNITS: 5000 INJECTION INTRAVENOUS; SUBCUTANEOUS at 21:01

## 2019-08-23 RX ADMIN — IPRATROPIUM BROMIDE AND ALBUTEROL SULFATE 3 ML: 2.5; .5 SOLUTION RESPIRATORY (INHALATION) at 16:05

## 2019-08-23 NOTE — ANESTHESIA POSTPROCEDURE EVALUATION
Patient: Angi Marinelli    Procedure Summary     Date:  08/22/19 Room / Location:   [facility]    Anesthesia Start:  1640 Anesthesia Stop:  2118    Procedures:       BRONCHOSCOPY (N/A Bronchus)      THORACOSCOPY VIDEO ASSISTED WITH RIGHT UPPER LOBECTOMY, INTERCOSTAL NERVE BLOCKS WITH CRYOABLATION (Right Chest)      MEDIASTINAL LYMPH NODE DISSECTION RIGHT (Right Chest) Diagnosis:       Malignant neoplasm of lower lobe of right lung (CMS/HCC)      (Malignant neoplasm of lower lobe of right lung (CMS/HCC) [C34.31])    Surgeon:  Shabbir Nance MD Provider:  Sadia Egan MD    Anesthesia Type:  general ASA Status:  3          Anesthesia Type: general  Last vitals  BP   100/71 (08/22/19 2118)   Temp   97.2 °F (36.2 °C) (08/22/19 2118)   Pulse   92 (08/22/19 2118)   Resp   20 (08/22/19 2118)     SpO2   95 % (08/22/19 2118)     Post Anesthesia Care and Evaluation    Patient location during evaluation: PACU  Patient participation: complete - patient participated  Level of consciousness: sleepy but conscious  Pain score: 4  Pain management: adequate  Airway patency: patent  Anesthetic complications: No anesthetic complications  PONV Status: none  Cardiovascular status: acceptable and hemodynamically stable  Respiratory status: acceptable, spontaneous ventilation and face mask  Hydration status: acceptable    Comments: No apparent anesthesia complications noted at this time

## 2019-08-24 ENCOUNTER — APPOINTMENT (OUTPATIENT)
Dept: GENERAL RADIOLOGY | Facility: HOSPITAL | Age: 74
End: 2019-08-24

## 2019-08-24 LAB
ANION GAP SERPL CALCULATED.3IONS-SCNC: 11 MMOL/L (ref 5–15)
BUN BLD-MCNC: 16 MG/DL (ref 8–23)
BUN/CREAT SERPL: 16.3 (ref 7–25)
CALCIUM SPEC-SCNC: 8.8 MG/DL (ref 8.6–10.5)
CHLORIDE SERPL-SCNC: 107 MMOL/L (ref 98–107)
CO2 SERPL-SCNC: 23 MMOL/L (ref 22–29)
CREAT BLD-MCNC: 0.98 MG/DL (ref 0.76–1.27)
GFR SERPL CREATININE-BSD FRML MDRD: 75 ML/MIN/1.73
GLUCOSE BLD-MCNC: 122 MG/DL (ref 65–99)
HCT VFR BLD AUTO: 33.8 % (ref 37.5–51)
HGB BLD-MCNC: 10.8 G/DL (ref 13–17.7)
MAGNESIUM SERPL-MCNC: 2.1 MG/DL (ref 1.6–2.4)
POTASSIUM BLD-SCNC: 3.9 MMOL/L (ref 3.5–5.2)
SODIUM BLD-SCNC: 141 MMOL/L (ref 136–145)

## 2019-08-24 PROCEDURE — 97116 GAIT TRAINING THERAPY: CPT

## 2019-08-24 PROCEDURE — 99232 SBSQ HOSP IP/OBS MODERATE 35: CPT | Performed by: INTERNAL MEDICINE

## 2019-08-24 PROCEDURE — 85014 HEMATOCRIT: CPT | Performed by: THORACIC SURGERY (CARDIOTHORACIC VASCULAR SURGERY)

## 2019-08-24 PROCEDURE — 94799 UNLISTED PULMONARY SVC/PX: CPT

## 2019-08-24 PROCEDURE — 25010000002 KETOROLAC TROMETHAMINE PER 15 MG: Performed by: INTERNAL MEDICINE

## 2019-08-24 PROCEDURE — 71045 X-RAY EXAM CHEST 1 VIEW: CPT

## 2019-08-24 PROCEDURE — 25010000002 ONDANSETRON PER 1 MG: Performed by: PHYSICIAN ASSISTANT

## 2019-08-24 PROCEDURE — 97110 THERAPEUTIC EXERCISES: CPT

## 2019-08-24 PROCEDURE — 25010000002 HEPARIN (PORCINE) PER 1000 UNITS: Performed by: PHYSICIAN ASSISTANT

## 2019-08-24 PROCEDURE — 83735 ASSAY OF MAGNESIUM: CPT | Performed by: THORACIC SURGERY (CARDIOTHORACIC VASCULAR SURGERY)

## 2019-08-24 PROCEDURE — 99024 POSTOP FOLLOW-UP VISIT: CPT | Performed by: THORACIC SURGERY (CARDIOTHORACIC VASCULAR SURGERY)

## 2019-08-24 PROCEDURE — 85018 HEMOGLOBIN: CPT | Performed by: THORACIC SURGERY (CARDIOTHORACIC VASCULAR SURGERY)

## 2019-08-24 PROCEDURE — 25010000002 MORPHINE PER 10 MG: Performed by: THORACIC SURGERY (CARDIOTHORACIC VASCULAR SURGERY)

## 2019-08-24 PROCEDURE — 80048 BASIC METABOLIC PNL TOTAL CA: CPT | Performed by: THORACIC SURGERY (CARDIOTHORACIC VASCULAR SURGERY)

## 2019-08-24 RX ORDER — RANITIDINE 150 MG/1
150 TABLET ORAL 2 TIMES DAILY
Status: DISCONTINUED | OUTPATIENT
Start: 2019-08-24 | End: 2019-08-30 | Stop reason: HOSPADM

## 2019-08-24 RX ADMIN — IPRATROPIUM BROMIDE AND ALBUTEROL SULFATE 3 ML: 2.5; .5 SOLUTION RESPIRATORY (INHALATION) at 07:38

## 2019-08-24 RX ADMIN — FAMOTIDINE 20 MG: 20 TABLET ORAL at 06:30

## 2019-08-24 RX ADMIN — HYDROCODONE BITARTRATE AND ACETAMINOPHEN 1 TABLET: 7.5; 325 TABLET ORAL at 10:09

## 2019-08-24 RX ADMIN — IPRATROPIUM BROMIDE AND ALBUTEROL SULFATE 3 ML: 2.5; .5 SOLUTION RESPIRATORY (INHALATION) at 15:34

## 2019-08-24 RX ADMIN — SODIUM CHLORIDE 30 ML/HR: 9 INJECTION, SOLUTION INTRAVENOUS at 20:05

## 2019-08-24 RX ADMIN — AMLODIPINE BESYLATE 10 MG: 10 TABLET ORAL at 08:46

## 2019-08-24 RX ADMIN — ONDANSETRON 4 MG: 2 INJECTION INTRAMUSCULAR; INTRAVENOUS at 22:03

## 2019-08-24 RX ADMIN — HEPARIN SODIUM 5000 UNITS: 5000 INJECTION INTRAVENOUS; SUBCUTANEOUS at 08:45

## 2019-08-24 RX ADMIN — RANITIDINE 150 MG: 150 TABLET, FILM COATED ORAL at 16:34

## 2019-08-24 RX ADMIN — KETOROLAC TROMETHAMINE 15 MG: 30 INJECTION, SOLUTION INTRAMUSCULAR; INTRAVENOUS at 00:15

## 2019-08-24 RX ADMIN — MONTELUKAST SODIUM 10 MG: 10 TABLET, COATED ORAL at 20:00

## 2019-08-24 RX ADMIN — ONDANSETRON 4 MG: 2 INJECTION INTRAMUSCULAR; INTRAVENOUS at 16:05

## 2019-08-24 RX ADMIN — ATORVASTATIN CALCIUM 20 MG: 20 TABLET, FILM COATED ORAL at 08:46

## 2019-08-24 RX ADMIN — KETOROLAC TROMETHAMINE 15 MG: 30 INJECTION, SOLUTION INTRAMUSCULAR; INTRAVENOUS at 08:45

## 2019-08-24 RX ADMIN — HYDROCODONE BITARTRATE AND ACETAMINOPHEN 1 TABLET: 7.5; 325 TABLET ORAL at 03:58

## 2019-08-24 RX ADMIN — HYDROCODONE BITARTRATE AND ACETAMINOPHEN 1 TABLET: 7.5; 325 TABLET ORAL at 20:00

## 2019-08-24 RX ADMIN — TOPIRAMATE 50 MG: 25 TABLET, FILM COATED ORAL at 20:01

## 2019-08-24 RX ADMIN — MORPHINE SULFATE 2 MG: 2 INJECTION, SOLUTION INTRAMUSCULAR; INTRAVENOUS at 06:09

## 2019-08-24 RX ADMIN — HEPARIN SODIUM 5000 UNITS: 5000 INJECTION INTRAVENOUS; SUBCUTANEOUS at 20:02

## 2019-08-24 RX ADMIN — ENALAPRIL MALEATE 20 MG: 10 TABLET ORAL at 20:01

## 2019-08-24 RX ADMIN — IPRATROPIUM BROMIDE AND ALBUTEROL SULFATE 3 ML: 2.5; .5 SOLUTION RESPIRATORY (INHALATION) at 21:17

## 2019-08-24 RX ADMIN — METHOCARBAMOL 500 MG: 500 TABLET, FILM COATED ORAL at 20:01

## 2019-08-24 RX ADMIN — NICOTINE 1 PATCH: 14 PATCH TRANSDERMAL at 22:06

## 2019-08-24 RX ADMIN — TOPIRAMATE 50 MG: 25 TABLET, FILM COATED ORAL at 08:46

## 2019-08-24 RX ADMIN — KETOROLAC TROMETHAMINE 15 MG: 30 INJECTION, SOLUTION INTRAMUSCULAR; INTRAVENOUS at 22:03

## 2019-08-24 RX ADMIN — POLYETHYLENE GLYCOL 3350 17 G: 17 POWDER, FOR SOLUTION ORAL at 08:45

## 2019-08-24 RX ADMIN — KETOROLAC TROMETHAMINE 15 MG: 30 INJECTION, SOLUTION INTRAMUSCULAR; INTRAVENOUS at 16:05

## 2019-08-24 RX ADMIN — FINASTERIDE 5 MG: 5 TABLET, FILM COATED ORAL at 08:45

## 2019-08-24 RX ADMIN — ROFLUMILAST 500 MCG: 500 TABLET ORAL at 08:45

## 2019-08-24 RX ADMIN — SENNOSIDES, DOCUSATE SODIUM 2 TABLET: 50; 8.6 TABLET, FILM COATED ORAL at 20:01

## 2019-08-24 RX ADMIN — METHOCARBAMOL 500 MG: 500 TABLET, FILM COATED ORAL at 08:45

## 2019-08-24 RX ADMIN — ASPIRIN 325 MG ORAL TABLET 325 MG: 325 PILL ORAL at 08:46

## 2019-08-24 RX ADMIN — MORPHINE SULFATE 2 MG: 2 INJECTION, SOLUTION INTRAMUSCULAR; INTRAVENOUS at 02:09

## 2019-08-24 RX ADMIN — MORPHINE SULFATE 2 MG: 2 INJECTION, SOLUTION INTRAMUSCULAR; INTRAVENOUS at 13:59

## 2019-08-24 RX ADMIN — ROPINIROLE 1 MG: 0.5 TABLET, FILM COATED ORAL at 20:02

## 2019-08-25 ENCOUNTER — APPOINTMENT (OUTPATIENT)
Dept: GENERAL RADIOLOGY | Facility: HOSPITAL | Age: 74
End: 2019-08-25

## 2019-08-25 LAB
ABO + RH BLD: NORMAL
ABO + RH BLD: NORMAL
BH BB BLOOD EXPIRATION DATE: NORMAL
BH BB BLOOD EXPIRATION DATE: NORMAL
BH BB BLOOD TYPE BARCODE: 8400
BH BB BLOOD TYPE BARCODE: 8400
BH BB DISPENSE STATUS: NORMAL
BH BB DISPENSE STATUS: NORMAL
BH BB PRODUCT CODE: NORMAL
BH BB PRODUCT CODE: NORMAL
BH BB UNIT NUMBER: NORMAL
BH BB UNIT NUMBER: NORMAL
CROSSMATCH INTERPRETATION: NORMAL
CROSSMATCH INTERPRETATION: NORMAL
HCT VFR BLD AUTO: 34.7 % (ref 37.5–51)
HGB BLD-MCNC: 11 G/DL (ref 13–17.7)
UNIT  ABO: NORMAL
UNIT  ABO: NORMAL
UNIT  RH: NORMAL
UNIT  RH: NORMAL

## 2019-08-25 PROCEDURE — 99232 SBSQ HOSP IP/OBS MODERATE 35: CPT | Performed by: INTERNAL MEDICINE

## 2019-08-25 PROCEDURE — 71045 X-RAY EXAM CHEST 1 VIEW: CPT

## 2019-08-25 PROCEDURE — 85018 HEMOGLOBIN: CPT | Performed by: PHYSICIAN ASSISTANT

## 2019-08-25 PROCEDURE — 25010000002 MORPHINE PER 10 MG: Performed by: THORACIC SURGERY (CARDIOTHORACIC VASCULAR SURGERY)

## 2019-08-25 PROCEDURE — 94799 UNLISTED PULMONARY SVC/PX: CPT

## 2019-08-25 PROCEDURE — 85014 HEMATOCRIT: CPT | Performed by: PHYSICIAN ASSISTANT

## 2019-08-25 PROCEDURE — 25010000002 KETOROLAC TROMETHAMINE PER 15 MG: Performed by: INTERNAL MEDICINE

## 2019-08-25 PROCEDURE — 25010000002 HEPARIN (PORCINE) PER 1000 UNITS: Performed by: PHYSICIAN ASSISTANT

## 2019-08-25 PROCEDURE — 97530 THERAPEUTIC ACTIVITIES: CPT

## 2019-08-25 PROCEDURE — 99024 POSTOP FOLLOW-UP VISIT: CPT | Performed by: THORACIC SURGERY (CARDIOTHORACIC VASCULAR SURGERY)

## 2019-08-25 PROCEDURE — 25010000002 FUROSEMIDE PER 20 MG: Performed by: PHYSICIAN ASSISTANT

## 2019-08-25 RX ORDER — KETOROLAC TROMETHAMINE 30 MG/ML
15 INJECTION, SOLUTION INTRAMUSCULAR; INTRAVENOUS EVERY 6 HOURS PRN
Status: DISCONTINUED | OUTPATIENT
Start: 2019-08-25 | End: 2019-08-26

## 2019-08-25 RX ORDER — FUROSEMIDE 10 MG/ML
40 INJECTION INTRAMUSCULAR; INTRAVENOUS ONCE
Status: COMPLETED | OUTPATIENT
Start: 2019-08-25 | End: 2019-08-25

## 2019-08-25 RX ADMIN — KETOROLAC TROMETHAMINE 15 MG: 30 INJECTION, SOLUTION INTRAMUSCULAR; INTRAVENOUS at 11:15

## 2019-08-25 RX ADMIN — MORPHINE SULFATE 2 MG: 2 INJECTION, SOLUTION INTRAMUSCULAR; INTRAVENOUS at 00:07

## 2019-08-25 RX ADMIN — POLYETHYLENE GLYCOL 3350 17 G: 17 POWDER, FOR SOLUTION ORAL at 08:10

## 2019-08-25 RX ADMIN — NICOTINE 1 PATCH: 14 PATCH TRANSDERMAL at 21:24

## 2019-08-25 RX ADMIN — FINASTERIDE 5 MG: 5 TABLET, FILM COATED ORAL at 08:10

## 2019-08-25 RX ADMIN — SENNOSIDES, DOCUSATE SODIUM 2 TABLET: 50; 8.6 TABLET, FILM COATED ORAL at 20:01

## 2019-08-25 RX ADMIN — MORPHINE SULFATE 2 MG: 2 INJECTION, SOLUTION INTRAMUSCULAR; INTRAVENOUS at 22:02

## 2019-08-25 RX ADMIN — FUROSEMIDE 40 MG: 10 INJECTION, SOLUTION INTRAMUSCULAR; INTRAVENOUS at 08:29

## 2019-08-25 RX ADMIN — MONTELUKAST SODIUM 10 MG: 10 TABLET, COATED ORAL at 20:00

## 2019-08-25 RX ADMIN — ROFLUMILAST 500 MCG: 500 TABLET ORAL at 08:10

## 2019-08-25 RX ADMIN — IPRATROPIUM BROMIDE AND ALBUTEROL SULFATE 3 ML: 2.5; .5 SOLUTION RESPIRATORY (INHALATION) at 19:38

## 2019-08-25 RX ADMIN — HYDROCODONE BITARTRATE AND ACETAMINOPHEN 1 TABLET: 7.5; 325 TABLET ORAL at 20:01

## 2019-08-25 RX ADMIN — HYDROCODONE BITARTRATE AND ACETAMINOPHEN 1 TABLET: 7.5; 325 TABLET ORAL at 14:02

## 2019-08-25 RX ADMIN — HYDROCODONE BITARTRATE AND ACETAMINOPHEN 1 TABLET: 7.5; 325 TABLET ORAL at 08:09

## 2019-08-25 RX ADMIN — HEPARIN SODIUM 5000 UNITS: 5000 INJECTION INTRAVENOUS; SUBCUTANEOUS at 08:09

## 2019-08-25 RX ADMIN — ENALAPRIL MALEATE 20 MG: 10 TABLET ORAL at 20:00

## 2019-08-25 RX ADMIN — AMLODIPINE BESYLATE 10 MG: 10 TABLET ORAL at 08:10

## 2019-08-25 RX ADMIN — IPRATROPIUM BROMIDE AND ALBUTEROL SULFATE 3 ML: 2.5; .5 SOLUTION RESPIRATORY (INHALATION) at 22:19

## 2019-08-25 RX ADMIN — TOPIRAMATE 50 MG: 25 TABLET, FILM COATED ORAL at 08:10

## 2019-08-25 RX ADMIN — METHOCARBAMOL 500 MG: 500 TABLET, FILM COATED ORAL at 20:01

## 2019-08-25 RX ADMIN — RANITIDINE 150 MG: 150 TABLET, FILM COATED ORAL at 20:02

## 2019-08-25 RX ADMIN — METHOCARBAMOL 500 MG: 500 TABLET, FILM COATED ORAL at 08:10

## 2019-08-25 RX ADMIN — RANITIDINE 150 MG: 150 TABLET, FILM COATED ORAL at 08:09

## 2019-08-25 RX ADMIN — HEPARIN SODIUM 5000 UNITS: 5000 INJECTION INTRAVENOUS; SUBCUTANEOUS at 20:00

## 2019-08-25 RX ADMIN — IPRATROPIUM BROMIDE AND ALBUTEROL SULFATE 3 ML: 2.5; .5 SOLUTION RESPIRATORY (INHALATION) at 12:35

## 2019-08-25 RX ADMIN — TOPIRAMATE 50 MG: 25 TABLET, FILM COATED ORAL at 20:01

## 2019-08-25 RX ADMIN — ROPINIROLE 1 MG: 0.5 TABLET, FILM COATED ORAL at 20:01

## 2019-08-25 RX ADMIN — ASPIRIN 325 MG ORAL TABLET 325 MG: 325 PILL ORAL at 08:10

## 2019-08-25 RX ADMIN — KETOROLAC TROMETHAMINE 15 MG: 30 INJECTION, SOLUTION INTRAMUSCULAR; INTRAVENOUS at 04:20

## 2019-08-25 RX ADMIN — IPRATROPIUM BROMIDE AND ALBUTEROL SULFATE 3 ML: 2.5; .5 SOLUTION RESPIRATORY (INHALATION) at 08:51

## 2019-08-25 RX ADMIN — HYDROCODONE BITARTRATE AND ACETAMINOPHEN 1 TABLET: 7.5; 325 TABLET ORAL at 01:59

## 2019-08-25 RX ADMIN — IPRATROPIUM BROMIDE AND ALBUTEROL SULFATE 3 ML: 2.5; .5 SOLUTION RESPIRATORY (INHALATION) at 16:36

## 2019-08-25 RX ADMIN — ATORVASTATIN CALCIUM 20 MG: 20 TABLET, FILM COATED ORAL at 08:09

## 2019-08-26 ENCOUNTER — APPOINTMENT (OUTPATIENT)
Dept: GENERAL RADIOLOGY | Facility: HOSPITAL | Age: 74
End: 2019-08-26

## 2019-08-26 LAB
CYTO UR: NORMAL
LAB AP CASE REPORT: NORMAL
LAB AP CLINICAL INFORMATION: NORMAL
LAB AP DIAGNOSIS COMMENT: NORMAL
Lab: NORMAL
PATH REPORT.FINAL DX SPEC: NORMAL
PATH REPORT.GROSS SPEC: NORMAL

## 2019-08-26 PROCEDURE — 94799 UNLISTED PULMONARY SVC/PX: CPT

## 2019-08-26 PROCEDURE — 97530 THERAPEUTIC ACTIVITIES: CPT

## 2019-08-26 PROCEDURE — 99232 SBSQ HOSP IP/OBS MODERATE 35: CPT | Performed by: INTERNAL MEDICINE

## 2019-08-26 PROCEDURE — 71045 X-RAY EXAM CHEST 1 VIEW: CPT

## 2019-08-26 PROCEDURE — 25010000002 HEPARIN (PORCINE) PER 1000 UNITS: Performed by: PHYSICIAN ASSISTANT

## 2019-08-26 PROCEDURE — 25010000002 ONDANSETRON PER 1 MG: Performed by: PHYSICIAN ASSISTANT

## 2019-08-26 PROCEDURE — 25010000002 KETOROLAC TROMETHAMINE PER 15 MG: Performed by: NURSE PRACTITIONER

## 2019-08-26 PROCEDURE — 97110 THERAPEUTIC EXERCISES: CPT

## 2019-08-26 PROCEDURE — 25010000002 MORPHINE PER 10 MG: Performed by: THORACIC SURGERY (CARDIOTHORACIC VASCULAR SURGERY)

## 2019-08-26 RX ORDER — BUMETANIDE 0.25 MG/ML
2 INJECTION INTRAMUSCULAR; INTRAVENOUS ONCE
Status: COMPLETED | OUTPATIENT
Start: 2019-08-26 | End: 2019-08-26

## 2019-08-26 RX ORDER — CALCIUM CARBONATE 200(500)MG
3 TABLET,CHEWABLE ORAL 3 TIMES DAILY PRN
Status: DISCONTINUED | OUTPATIENT
Start: 2019-08-26 | End: 2019-08-30 | Stop reason: HOSPADM

## 2019-08-26 RX ORDER — KETOROLAC TROMETHAMINE 30 MG/ML
15 INJECTION, SOLUTION INTRAMUSCULAR; INTRAVENOUS EVERY 6 HOURS PRN
Status: COMPLETED | OUTPATIENT
Start: 2019-08-26 | End: 2019-08-27

## 2019-08-26 RX ADMIN — BUMETANIDE 2 MG: 0.25 INJECTION INTRAMUSCULAR; INTRAVENOUS at 08:53

## 2019-08-26 RX ADMIN — HEPARIN SODIUM 5000 UNITS: 5000 INJECTION INTRAVENOUS; SUBCUTANEOUS at 08:51

## 2019-08-26 RX ADMIN — ONDANSETRON 4 MG: 2 INJECTION INTRAMUSCULAR; INTRAVENOUS at 15:49

## 2019-08-26 RX ADMIN — TOPIRAMATE 50 MG: 25 TABLET, FILM COATED ORAL at 20:21

## 2019-08-26 RX ADMIN — ROFLUMILAST 500 MCG: 500 TABLET ORAL at 08:52

## 2019-08-26 RX ADMIN — POLYETHYLENE GLYCOL 3350 17 G: 17 POWDER, FOR SOLUTION ORAL at 08:53

## 2019-08-26 RX ADMIN — KETOROLAC TROMETHAMINE 15 MG: 30 INJECTION, SOLUTION INTRAMUSCULAR at 00:01

## 2019-08-26 RX ADMIN — NICOTINE 1 PATCH: 14 PATCH TRANSDERMAL at 21:26

## 2019-08-26 RX ADMIN — HEPARIN SODIUM 5000 UNITS: 5000 INJECTION INTRAVENOUS; SUBCUTANEOUS at 20:22

## 2019-08-26 RX ADMIN — HYDROCODONE BITARTRATE AND ACETAMINOPHEN 1 TABLET: 7.5; 325 TABLET ORAL at 08:50

## 2019-08-26 RX ADMIN — ATORVASTATIN CALCIUM 20 MG: 20 TABLET, FILM COATED ORAL at 08:52

## 2019-08-26 RX ADMIN — ENALAPRIL MALEATE 20 MG: 10 TABLET ORAL at 20:39

## 2019-08-26 RX ADMIN — DOCUSATE SODIUM 100 MG: 100 CAPSULE, LIQUID FILLED ORAL at 15:55

## 2019-08-26 RX ADMIN — MONTELUKAST SODIUM 10 MG: 10 TABLET, COATED ORAL at 20:21

## 2019-08-26 RX ADMIN — METHOCARBAMOL 500 MG: 500 TABLET, FILM COATED ORAL at 08:52

## 2019-08-26 RX ADMIN — IPRATROPIUM BROMIDE AND ALBUTEROL SULFATE 3 ML: 2.5; .5 SOLUTION RESPIRATORY (INHALATION) at 19:50

## 2019-08-26 RX ADMIN — FINASTERIDE 5 MG: 5 TABLET, FILM COATED ORAL at 08:52

## 2019-08-26 RX ADMIN — HYDROCODONE BITARTRATE AND ACETAMINOPHEN 1 TABLET: 7.5; 325 TABLET ORAL at 02:15

## 2019-08-26 RX ADMIN — TOPIRAMATE 50 MG: 25 TABLET, FILM COATED ORAL at 08:52

## 2019-08-26 RX ADMIN — CALCIUM CARBONATE 3 TABLET: 500 TABLET ORAL at 15:52

## 2019-08-26 RX ADMIN — RANITIDINE 150 MG: 150 TABLET, FILM COATED ORAL at 08:51

## 2019-08-26 RX ADMIN — HYDROCODONE BITARTRATE AND ACETAMINOPHEN 1 TABLET: 7.5; 325 TABLET ORAL at 20:35

## 2019-08-26 RX ADMIN — SENNOSIDES, DOCUSATE SODIUM 2 TABLET: 50; 8.6 TABLET, FILM COATED ORAL at 20:21

## 2019-08-26 RX ADMIN — IPRATROPIUM BROMIDE AND ALBUTEROL SULFATE 3 ML: 2.5; .5 SOLUTION RESPIRATORY (INHALATION) at 07:48

## 2019-08-26 RX ADMIN — IPRATROPIUM BROMIDE AND ALBUTEROL SULFATE 3 ML: 2.5; .5 SOLUTION RESPIRATORY (INHALATION) at 16:18

## 2019-08-26 RX ADMIN — ASPIRIN 325 MG ORAL TABLET 325 MG: 325 PILL ORAL at 08:50

## 2019-08-26 RX ADMIN — AMLODIPINE BESYLATE 10 MG: 10 TABLET ORAL at 08:51

## 2019-08-26 RX ADMIN — METHOCARBAMOL 500 MG: 500 TABLET, FILM COATED ORAL at 20:21

## 2019-08-26 RX ADMIN — MORPHINE SULFATE 2 MG: 2 INJECTION, SOLUTION INTRAMUSCULAR; INTRAVENOUS at 04:10

## 2019-08-26 RX ADMIN — ROPINIROLE 1 MG: 0.5 TABLET, FILM COATED ORAL at 20:22

## 2019-08-26 RX ADMIN — RANITIDINE 150 MG: 150 TABLET, FILM COATED ORAL at 20:22

## 2019-08-26 RX ADMIN — IPRATROPIUM BROMIDE AND ALBUTEROL SULFATE 3 ML: 2.5; .5 SOLUTION RESPIRATORY (INHALATION) at 12:52

## 2019-08-27 ENCOUNTER — APPOINTMENT (OUTPATIENT)
Dept: GENERAL RADIOLOGY | Facility: HOSPITAL | Age: 74
End: 2019-08-27

## 2019-08-27 LAB
ANION GAP SERPL CALCULATED.3IONS-SCNC: 11 MMOL/L (ref 5–15)
BUN BLD-MCNC: 18 MG/DL (ref 8–23)
BUN/CREAT SERPL: 15.9 (ref 7–25)
CALCIUM SPEC-SCNC: 9.5 MG/DL (ref 8.6–10.5)
CHLORIDE SERPL-SCNC: 109 MMOL/L (ref 98–107)
CO2 SERPL-SCNC: 24 MMOL/L (ref 22–29)
CREAT BLD-MCNC: 1.13 MG/DL (ref 0.76–1.27)
GFR SERPL CREATININE-BSD FRML MDRD: 63 ML/MIN/1.73
GLUCOSE BLD-MCNC: 104 MG/DL (ref 65–99)
POTASSIUM BLD-SCNC: 3.7 MMOL/L (ref 3.5–5.2)
SODIUM BLD-SCNC: 144 MMOL/L (ref 136–145)

## 2019-08-27 PROCEDURE — 94799 UNLISTED PULMONARY SVC/PX: CPT

## 2019-08-27 PROCEDURE — 97110 THERAPEUTIC EXERCISES: CPT

## 2019-08-27 PROCEDURE — 71045 X-RAY EXAM CHEST 1 VIEW: CPT

## 2019-08-27 PROCEDURE — 99232 SBSQ HOSP IP/OBS MODERATE 35: CPT | Performed by: INTERNAL MEDICINE

## 2019-08-27 PROCEDURE — 97116 GAIT TRAINING THERAPY: CPT

## 2019-08-27 PROCEDURE — 80048 BASIC METABOLIC PNL TOTAL CA: CPT | Performed by: THORACIC SURGERY (CARDIOTHORACIC VASCULAR SURGERY)

## 2019-08-27 PROCEDURE — 25010000002 KETOROLAC TROMETHAMINE PER 15 MG: Performed by: PHYSICIAN ASSISTANT

## 2019-08-27 PROCEDURE — 25010000002 HEPARIN (PORCINE) PER 1000 UNITS: Performed by: PHYSICIAN ASSISTANT

## 2019-08-27 RX ORDER — BUMETANIDE 0.25 MG/ML
1 INJECTION INTRAMUSCULAR; INTRAVENOUS ONCE
Status: COMPLETED | OUTPATIENT
Start: 2019-08-27 | End: 2019-08-27

## 2019-08-27 RX ADMIN — TOPIRAMATE 50 MG: 25 TABLET, FILM COATED ORAL at 08:12

## 2019-08-27 RX ADMIN — SENNOSIDES, DOCUSATE SODIUM 2 TABLET: 50; 8.6 TABLET, FILM COATED ORAL at 21:41

## 2019-08-27 RX ADMIN — ROPINIROLE 1 MG: 0.5 TABLET, FILM COATED ORAL at 21:36

## 2019-08-27 RX ADMIN — METHOCARBAMOL 500 MG: 500 TABLET, FILM COATED ORAL at 08:11

## 2019-08-27 RX ADMIN — HEPARIN SODIUM 5000 UNITS: 5000 INJECTION INTRAVENOUS; SUBCUTANEOUS at 08:12

## 2019-08-27 RX ADMIN — RANITIDINE 150 MG: 150 TABLET, FILM COATED ORAL at 21:42

## 2019-08-27 RX ADMIN — IPRATROPIUM BROMIDE AND ALBUTEROL SULFATE 3 ML: 2.5; .5 SOLUTION RESPIRATORY (INHALATION) at 19:41

## 2019-08-27 RX ADMIN — POLYETHYLENE GLYCOL 3350 17 G: 17 POWDER, FOR SOLUTION ORAL at 08:11

## 2019-08-27 RX ADMIN — FINASTERIDE 5 MG: 5 TABLET, FILM COATED ORAL at 08:11

## 2019-08-27 RX ADMIN — KETOROLAC TROMETHAMINE 15 MG: 30 INJECTION, SOLUTION INTRAMUSCULAR; INTRAVENOUS at 15:05

## 2019-08-27 RX ADMIN — ROFLUMILAST 500 MCG: 500 TABLET ORAL at 08:11

## 2019-08-27 RX ADMIN — HEPARIN SODIUM 5000 UNITS: 5000 INJECTION INTRAVENOUS; SUBCUTANEOUS at 21:36

## 2019-08-27 RX ADMIN — HYDROCODONE BITARTRATE AND ACETAMINOPHEN 1 TABLET: 7.5; 325 TABLET ORAL at 10:07

## 2019-08-27 RX ADMIN — TOPIRAMATE 50 MG: 25 TABLET, FILM COATED ORAL at 21:37

## 2019-08-27 RX ADMIN — IPRATROPIUM BROMIDE AND ALBUTEROL SULFATE 3 ML: 2.5; .5 SOLUTION RESPIRATORY (INHALATION) at 08:16

## 2019-08-27 RX ADMIN — IPRATROPIUM BROMIDE AND ALBUTEROL SULFATE 3 ML: 2.5; .5 SOLUTION RESPIRATORY (INHALATION) at 12:38

## 2019-08-27 RX ADMIN — NICOTINE 1 PATCH: 14 PATCH TRANSDERMAL at 21:48

## 2019-08-27 RX ADMIN — BUMETANIDE 1 MG: 0.25 INJECTION INTRAMUSCULAR; INTRAVENOUS at 08:12

## 2019-08-27 RX ADMIN — HYDROCODONE BITARTRATE AND ACETAMINOPHEN 1 TABLET: 7.5; 325 TABLET ORAL at 22:54

## 2019-08-27 RX ADMIN — RANITIDINE 150 MG: 150 TABLET, FILM COATED ORAL at 08:12

## 2019-08-27 RX ADMIN — KETOROLAC TROMETHAMINE 15 MG: 30 INJECTION, SOLUTION INTRAMUSCULAR; INTRAVENOUS at 00:30

## 2019-08-27 RX ADMIN — MONTELUKAST SODIUM 10 MG: 10 TABLET, COATED ORAL at 21:36

## 2019-08-27 RX ADMIN — HYDROCODONE BITARTRATE AND ACETAMINOPHEN 1 TABLET: 7.5; 325 TABLET ORAL at 17:16

## 2019-08-27 RX ADMIN — METHOCARBAMOL 500 MG: 500 TABLET, FILM COATED ORAL at 21:36

## 2019-08-27 RX ADMIN — ENALAPRIL MALEATE 20 MG: 10 TABLET ORAL at 21:37

## 2019-08-27 RX ADMIN — ASPIRIN 325 MG ORAL TABLET 325 MG: 325 PILL ORAL at 08:12

## 2019-08-27 RX ADMIN — ATORVASTATIN CALCIUM 20 MG: 20 TABLET, FILM COATED ORAL at 08:12

## 2019-08-27 RX ADMIN — HYDROCODONE BITARTRATE AND ACETAMINOPHEN 1 TABLET: 7.5; 325 TABLET ORAL at 03:04

## 2019-08-28 ENCOUNTER — APPOINTMENT (OUTPATIENT)
Dept: GENERAL RADIOLOGY | Facility: HOSPITAL | Age: 74
End: 2019-08-28

## 2019-08-28 LAB
ANION GAP SERPL CALCULATED.3IONS-SCNC: 12 MMOL/L (ref 5–15)
BUN BLD-MCNC: 24 MG/DL (ref 8–23)
BUN/CREAT SERPL: 21.4 (ref 7–25)
CALCIUM SPEC-SCNC: 9.5 MG/DL (ref 8.6–10.5)
CHLORIDE SERPL-SCNC: 103 MMOL/L (ref 98–107)
CO2 SERPL-SCNC: 24 MMOL/L (ref 22–29)
CREAT BLD-MCNC: 1.12 MG/DL (ref 0.76–1.27)
DEPRECATED RDW RBC AUTO: 50.1 FL (ref 37–54)
ERYTHROCYTE [DISTWIDTH] IN BLOOD BY AUTOMATED COUNT: 15 % (ref 12.3–15.4)
GFR SERPL CREATININE-BSD FRML MDRD: 64 ML/MIN/1.73
GLUCOSE BLD-MCNC: 136 MG/DL (ref 65–99)
GLUCOSE BLDC GLUCOMTR-MCNC: 131 MG/DL (ref 70–130)
HCT VFR BLD AUTO: 35.8 % (ref 37.5–51)
HGB BLD-MCNC: 11.3 G/DL (ref 13–17.7)
MCH RBC QN AUTO: 28.5 PG (ref 26.6–33)
MCHC RBC AUTO-ENTMCNC: 31.6 G/DL (ref 31.5–35.7)
MCV RBC AUTO: 90.2 FL (ref 79–97)
PLATELET # BLD AUTO: 272 10*3/MM3 (ref 140–450)
PMV BLD AUTO: 10.2 FL (ref 6–12)
POTASSIUM BLD-SCNC: 3.5 MMOL/L (ref 3.5–5.2)
RBC # BLD AUTO: 3.97 10*6/MM3 (ref 4.14–5.8)
SODIUM BLD-SCNC: 139 MMOL/L (ref 136–145)
WBC NRBC COR # BLD: 18.27 10*3/MM3 (ref 3.4–10.8)

## 2019-08-28 PROCEDURE — 71045 X-RAY EXAM CHEST 1 VIEW: CPT

## 2019-08-28 PROCEDURE — 85027 COMPLETE CBC AUTOMATED: CPT | Performed by: PHYSICIAN ASSISTANT

## 2019-08-28 PROCEDURE — 80048 BASIC METABOLIC PNL TOTAL CA: CPT | Performed by: PHYSICIAN ASSISTANT

## 2019-08-28 PROCEDURE — 94799 UNLISTED PULMONARY SVC/PX: CPT

## 2019-08-28 PROCEDURE — 25010000002 HEPARIN (PORCINE) PER 1000 UNITS: Performed by: PHYSICIAN ASSISTANT

## 2019-08-28 PROCEDURE — 82962 GLUCOSE BLOOD TEST: CPT

## 2019-08-28 PROCEDURE — 25010000002 MORPHINE PER 10 MG: Performed by: PHYSICIAN ASSISTANT

## 2019-08-28 RX ORDER — MORPHINE SULFATE 2 MG/ML
2 INJECTION, SOLUTION INTRAMUSCULAR; INTRAVENOUS EVERY 4 HOURS PRN
Status: DISCONTINUED | OUTPATIENT
Start: 2019-08-28 | End: 2019-08-30 | Stop reason: HOSPADM

## 2019-08-28 RX ADMIN — HYDROCODONE BITARTRATE AND ACETAMINOPHEN 1 TABLET: 7.5; 325 TABLET ORAL at 22:11

## 2019-08-28 RX ADMIN — HYDROCODONE BITARTRATE AND ACETAMINOPHEN 1 TABLET: 7.5; 325 TABLET ORAL at 06:47

## 2019-08-28 RX ADMIN — METHOCARBAMOL 500 MG: 500 TABLET, FILM COATED ORAL at 09:31

## 2019-08-28 RX ADMIN — TOPIRAMATE 50 MG: 25 TABLET, FILM COATED ORAL at 22:11

## 2019-08-28 RX ADMIN — ROFLUMILAST 500 MCG: 500 TABLET ORAL at 13:12

## 2019-08-28 RX ADMIN — RANITIDINE 150 MG: 150 TABLET, FILM COATED ORAL at 22:21

## 2019-08-28 RX ADMIN — TOPIRAMATE 50 MG: 25 TABLET, FILM COATED ORAL at 11:36

## 2019-08-28 RX ADMIN — ROPINIROLE 1 MG: 0.5 TABLET, FILM COATED ORAL at 22:10

## 2019-08-28 RX ADMIN — IPRATROPIUM BROMIDE AND ALBUTEROL SULFATE 3 ML: 2.5; .5 SOLUTION RESPIRATORY (INHALATION) at 06:42

## 2019-08-28 RX ADMIN — MONTELUKAST SODIUM 10 MG: 10 TABLET, COATED ORAL at 22:11

## 2019-08-28 RX ADMIN — POLYETHYLENE GLYCOL 3350 17 G: 17 POWDER, FOR SOLUTION ORAL at 09:30

## 2019-08-28 RX ADMIN — ENALAPRIL MALEATE 20 MG: 10 TABLET ORAL at 22:11

## 2019-08-28 RX ADMIN — ATORVASTATIN CALCIUM 20 MG: 20 TABLET, FILM COATED ORAL at 09:30

## 2019-08-28 RX ADMIN — SENNOSIDES, DOCUSATE SODIUM 2 TABLET: 50; 8.6 TABLET, FILM COATED ORAL at 22:10

## 2019-08-28 RX ADMIN — NICOTINE 1 PATCH: 14 PATCH TRANSDERMAL at 22:21

## 2019-08-28 RX ADMIN — IPRATROPIUM BROMIDE AND ALBUTEROL SULFATE 3 ML: 2.5; .5 SOLUTION RESPIRATORY (INHALATION) at 11:42

## 2019-08-28 RX ADMIN — HEPARIN SODIUM 5000 UNITS: 5000 INJECTION INTRAVENOUS; SUBCUTANEOUS at 09:31

## 2019-08-28 RX ADMIN — RANITIDINE 150 MG: 150 TABLET, FILM COATED ORAL at 11:37

## 2019-08-28 RX ADMIN — FINASTERIDE 5 MG: 5 TABLET, FILM COATED ORAL at 09:31

## 2019-08-28 RX ADMIN — AMLODIPINE BESYLATE 10 MG: 10 TABLET ORAL at 09:30

## 2019-08-28 RX ADMIN — HYDROCODONE BITARTRATE AND ACETAMINOPHEN 1 TABLET: 7.5; 325 TABLET ORAL at 15:57

## 2019-08-28 RX ADMIN — HEPARIN SODIUM 5000 UNITS: 5000 INJECTION INTRAVENOUS; SUBCUTANEOUS at 22:10

## 2019-08-28 RX ADMIN — IPRATROPIUM BROMIDE AND ALBUTEROL SULFATE 3 ML: 2.5; .5 SOLUTION RESPIRATORY (INHALATION) at 20:24

## 2019-08-28 RX ADMIN — ASPIRIN 325 MG ORAL TABLET 325 MG: 325 PILL ORAL at 09:30

## 2019-08-28 RX ADMIN — METHOCARBAMOL 500 MG: 500 TABLET, FILM COATED ORAL at 22:11

## 2019-08-28 RX ADMIN — MORPHINE SULFATE 2 MG: 2 INJECTION, SOLUTION INTRAMUSCULAR; INTRAVENOUS at 00:28

## 2019-08-28 RX ADMIN — IPRATROPIUM BROMIDE AND ALBUTEROL SULFATE 3 ML: 2.5; .5 SOLUTION RESPIRATORY (INHALATION) at 15:36

## 2019-08-29 ENCOUNTER — APPOINTMENT (OUTPATIENT)
Dept: GENERAL RADIOLOGY | Facility: HOSPITAL | Age: 74
End: 2019-08-29

## 2019-08-29 LAB
ANION GAP SERPL CALCULATED.3IONS-SCNC: 12 MMOL/L (ref 5–15)
BASOPHILS # BLD AUTO: 0.06 10*3/MM3 (ref 0–0.2)
BASOPHILS NFR BLD AUTO: 0.4 % (ref 0–1.5)
BUN BLD-MCNC: 26 MG/DL (ref 8–23)
BUN/CREAT SERPL: 22.4 (ref 7–25)
CALCIUM SPEC-SCNC: 9.5 MG/DL (ref 8.6–10.5)
CHLORIDE SERPL-SCNC: 104 MMOL/L (ref 98–107)
CO2 SERPL-SCNC: 23 MMOL/L (ref 22–29)
CREAT BLD-MCNC: 1.16 MG/DL (ref 0.76–1.27)
DEPRECATED RDW RBC AUTO: 50.5 FL (ref 37–54)
EOSINOPHIL # BLD AUTO: 0.28 10*3/MM3 (ref 0–0.4)
EOSINOPHIL NFR BLD AUTO: 1.9 % (ref 0.3–6.2)
ERYTHROCYTE [DISTWIDTH] IN BLOOD BY AUTOMATED COUNT: 15.1 % (ref 12.3–15.4)
GFR SERPL CREATININE-BSD FRML MDRD: 61 ML/MIN/1.73
GLUCOSE BLD-MCNC: 117 MG/DL (ref 65–99)
HCT VFR BLD AUTO: 34.5 % (ref 37.5–51)
HGB BLD-MCNC: 10.8 G/DL (ref 13–17.7)
IMM GRANULOCYTES # BLD AUTO: 0.2 10*3/MM3 (ref 0–0.05)
IMM GRANULOCYTES NFR BLD AUTO: 1.3 % (ref 0–0.5)
LYMPHOCYTES # BLD AUTO: 1.87 10*3/MM3 (ref 0.7–3.1)
LYMPHOCYTES NFR BLD AUTO: 12.6 % (ref 19.6–45.3)
MCH RBC QN AUTO: 28.7 PG (ref 26.6–33)
MCHC RBC AUTO-ENTMCNC: 31.3 G/DL (ref 31.5–35.7)
MCV RBC AUTO: 91.8 FL (ref 79–97)
MONOCYTES # BLD AUTO: 1.12 10*3/MM3 (ref 0.1–0.9)
MONOCYTES NFR BLD AUTO: 7.6 % (ref 5–12)
NEUTROPHILS # BLD AUTO: 11.3 10*3/MM3 (ref 1.7–7)
NEUTROPHILS NFR BLD AUTO: 76.2 % (ref 42.7–76)
NRBC BLD AUTO-RTO: 0 /100 WBC (ref 0–0.2)
PLATELET # BLD AUTO: 251 10*3/MM3 (ref 140–450)
PMV BLD AUTO: 10.3 FL (ref 6–12)
POTASSIUM BLD-SCNC: 3.5 MMOL/L (ref 3.5–5.2)
RBC # BLD AUTO: 3.76 10*6/MM3 (ref 4.14–5.8)
SODIUM BLD-SCNC: 139 MMOL/L (ref 136–145)
WBC NRBC COR # BLD: 14.83 10*3/MM3 (ref 3.4–10.8)

## 2019-08-29 PROCEDURE — 94799 UNLISTED PULMONARY SVC/PX: CPT

## 2019-08-29 PROCEDURE — 71045 X-RAY EXAM CHEST 1 VIEW: CPT

## 2019-08-29 PROCEDURE — 85025 COMPLETE CBC W/AUTO DIFF WBC: CPT | Performed by: PHYSICIAN ASSISTANT

## 2019-08-29 PROCEDURE — 25010000002 HEPARIN (PORCINE) PER 1000 UNITS: Performed by: PHYSICIAN ASSISTANT

## 2019-08-29 PROCEDURE — 80048 BASIC METABOLIC PNL TOTAL CA: CPT | Performed by: PHYSICIAN ASSISTANT

## 2019-08-29 PROCEDURE — 97116 GAIT TRAINING THERAPY: CPT

## 2019-08-29 PROCEDURE — 97110 THERAPEUTIC EXERCISES: CPT

## 2019-08-29 RX ADMIN — ROPINIROLE 1 MG: 0.5 TABLET, FILM COATED ORAL at 20:35

## 2019-08-29 RX ADMIN — FINASTERIDE 5 MG: 5 TABLET, FILM COATED ORAL at 10:10

## 2019-08-29 RX ADMIN — IPRATROPIUM BROMIDE AND ALBUTEROL SULFATE 3 ML: 2.5; .5 SOLUTION RESPIRATORY (INHALATION) at 20:39

## 2019-08-29 RX ADMIN — TOPIRAMATE 50 MG: 25 TABLET, FILM COATED ORAL at 10:08

## 2019-08-29 RX ADMIN — METHOCARBAMOL 500 MG: 500 TABLET, FILM COATED ORAL at 20:35

## 2019-08-29 RX ADMIN — ASPIRIN 325 MG ORAL TABLET 325 MG: 325 PILL ORAL at 10:09

## 2019-08-29 RX ADMIN — IPRATROPIUM BROMIDE AND ALBUTEROL SULFATE 3 ML: 2.5; .5 SOLUTION RESPIRATORY (INHALATION) at 08:17

## 2019-08-29 RX ADMIN — IPRATROPIUM BROMIDE AND ALBUTEROL SULFATE 3 ML: 2.5; .5 SOLUTION RESPIRATORY (INHALATION) at 12:05

## 2019-08-29 RX ADMIN — POLYETHYLENE GLYCOL 3350 17 G: 17 POWDER, FOR SOLUTION ORAL at 10:09

## 2019-08-29 RX ADMIN — HEPARIN SODIUM 5000 UNITS: 5000 INJECTION INTRAVENOUS; SUBCUTANEOUS at 10:10

## 2019-08-29 RX ADMIN — SENNOSIDES, DOCUSATE SODIUM 2 TABLET: 50; 8.6 TABLET, FILM COATED ORAL at 20:35

## 2019-08-29 RX ADMIN — ROFLUMILAST 500 MCG: 500 TABLET ORAL at 10:09

## 2019-08-29 RX ADMIN — ATORVASTATIN CALCIUM 20 MG: 20 TABLET, FILM COATED ORAL at 10:10

## 2019-08-29 RX ADMIN — HYDROCODONE BITARTRATE AND ACETAMINOPHEN 1 TABLET: 7.5; 325 TABLET ORAL at 17:42

## 2019-08-29 RX ADMIN — ENALAPRIL MALEATE 20 MG: 10 TABLET ORAL at 20:36

## 2019-08-29 RX ADMIN — HEPARIN SODIUM 5000 UNITS: 5000 INJECTION INTRAVENOUS; SUBCUTANEOUS at 20:36

## 2019-08-29 RX ADMIN — HYDROCODONE BITARTRATE AND ACETAMINOPHEN 1 TABLET: 7.5; 325 TABLET ORAL at 10:22

## 2019-08-29 RX ADMIN — MONTELUKAST SODIUM 10 MG: 10 TABLET, COATED ORAL at 20:36

## 2019-08-29 RX ADMIN — RANITIDINE 150 MG: 150 TABLET, FILM COATED ORAL at 20:44

## 2019-08-29 RX ADMIN — METHOCARBAMOL 500 MG: 500 TABLET, FILM COATED ORAL at 10:08

## 2019-08-29 RX ADMIN — RANITIDINE 150 MG: 150 TABLET, FILM COATED ORAL at 10:11

## 2019-08-29 RX ADMIN — IPRATROPIUM BROMIDE AND ALBUTEROL SULFATE 3 ML: 2.5; .5 SOLUTION RESPIRATORY (INHALATION) at 16:18

## 2019-08-29 RX ADMIN — HYDROCODONE BITARTRATE AND ACETAMINOPHEN 1 TABLET: 7.5; 325 TABLET ORAL at 03:43

## 2019-08-29 RX ADMIN — AMLODIPINE BESYLATE 10 MG: 10 TABLET ORAL at 10:10

## 2019-08-29 RX ADMIN — NICOTINE 1 PATCH: 14 PATCH TRANSDERMAL at 20:45

## 2019-08-29 RX ADMIN — TOPIRAMATE 50 MG: 25 TABLET, FILM COATED ORAL at 20:36

## 2019-08-30 ENCOUNTER — APPOINTMENT (OUTPATIENT)
Dept: GENERAL RADIOLOGY | Facility: HOSPITAL | Age: 74
End: 2019-08-30

## 2019-08-30 VITALS
SYSTOLIC BLOOD PRESSURE: 105 MMHG | RESPIRATION RATE: 18 BRPM | WEIGHT: 175 LBS | TEMPERATURE: 97.7 F | HEIGHT: 72 IN | HEART RATE: 99 BPM | BODY MASS INDEX: 23.7 KG/M2 | OXYGEN SATURATION: 94 % | DIASTOLIC BLOOD PRESSURE: 66 MMHG

## 2019-08-30 PROCEDURE — 99024 POSTOP FOLLOW-UP VISIT: CPT | Performed by: PHYSICIAN ASSISTANT

## 2019-08-30 PROCEDURE — 25010000002 HEPARIN (PORCINE) PER 1000 UNITS: Performed by: PHYSICIAN ASSISTANT

## 2019-08-30 PROCEDURE — 94799 UNLISTED PULMONARY SVC/PX: CPT

## 2019-08-30 PROCEDURE — 71045 X-RAY EXAM CHEST 1 VIEW: CPT

## 2019-08-30 RX ORDER — HYDROCODONE BITARTRATE AND ACETAMINOPHEN 5; 325 MG/1; MG/1
1 TABLET ORAL 2 TIMES DAILY
Qty: 30 TABLET | Refills: 0 | Status: SHIPPED | OUTPATIENT
Start: 2019-08-30

## 2019-08-30 RX ORDER — HYDROCODONE BITARTRATE AND ACETAMINOPHEN 7.5; 325 MG/1; MG/1
1 TABLET ORAL ONCE AS NEEDED
Status: COMPLETED | OUTPATIENT
Start: 2019-08-30 | End: 2019-08-30

## 2019-08-30 RX ADMIN — IPRATROPIUM BROMIDE AND ALBUTEROL SULFATE 3 ML: 2.5; .5 SOLUTION RESPIRATORY (INHALATION) at 12:03

## 2019-08-30 RX ADMIN — IPRATROPIUM BROMIDE AND ALBUTEROL SULFATE 3 ML: 2.5; .5 SOLUTION RESPIRATORY (INHALATION) at 07:31

## 2019-08-30 RX ADMIN — ATORVASTATIN CALCIUM 20 MG: 20 TABLET, FILM COATED ORAL at 09:12

## 2019-08-30 RX ADMIN — HYDROCODONE BITARTRATE AND ACETAMINOPHEN 1 TABLET: 7.5; 325 TABLET ORAL at 00:36

## 2019-08-30 RX ADMIN — AMLODIPINE BESYLATE 10 MG: 10 TABLET ORAL at 09:12

## 2019-08-30 RX ADMIN — RANITIDINE 150 MG: 150 TABLET, FILM COATED ORAL at 09:13

## 2019-08-30 RX ADMIN — HEPARIN SODIUM 5000 UNITS: 5000 INJECTION INTRAVENOUS; SUBCUTANEOUS at 09:12

## 2019-08-30 RX ADMIN — FINASTERIDE 5 MG: 5 TABLET, FILM COATED ORAL at 09:12

## 2019-08-30 RX ADMIN — TOPIRAMATE 50 MG: 25 TABLET, FILM COATED ORAL at 11:46

## 2019-08-30 RX ADMIN — HYDROCODONE BITARTRATE AND ACETAMINOPHEN 1 TABLET: 7.5; 325 TABLET ORAL at 11:52

## 2019-08-30 RX ADMIN — ASPIRIN 325 MG ORAL TABLET 325 MG: 325 PILL ORAL at 09:11

## 2019-08-30 RX ADMIN — ROFLUMILAST 500 MCG: 500 TABLET ORAL at 11:46

## 2019-08-30 RX ADMIN — METHOCARBAMOL 500 MG: 500 TABLET, FILM COATED ORAL at 09:12

## 2019-08-30 RX ADMIN — POLYETHYLENE GLYCOL 3350 17 G: 17 POWDER, FOR SOLUTION ORAL at 09:12

## 2019-08-30 RX ADMIN — HYDROCODONE BITARTRATE AND ACETAMINOPHEN 1 TABLET: 7.5; 325 TABLET ORAL at 04:26

## 2019-09-03 ENCOUNTER — TELEPHONE (OUTPATIENT)
Dept: CARDIAC SURGERY | Facility: CLINIC | Age: 74
End: 2019-09-03

## 2019-09-03 NOTE — TELEPHONE ENCOUNTER
----- Message from Shabbir Nance MD sent at 8/30/2019  6:29 PM EDT -----  Please arrange for office visit in 2 weeks with CXR given he has a pneumostat.

## 2019-09-03 NOTE — TELEPHONE ENCOUNTER
HH nurse called to report some drainage around chest tube sites.  Nurse says this is new in the past couple of days.  Also says skin around chest tube sites is yellow and looks irritated.  She thinks it's from the dressings being wet from the drainage.  Patient feels good, no temp or odor from areas.  She left supplies and advised family to change dressings often, anything else to do?

## 2019-09-04 NOTE — TELEPHONE ENCOUNTER
Empty Pneumostat when full to allow for proper drainage.  Keep dressing on skin dry and change as often as needed to keep wound dry.

## 2019-09-05 ENCOUNTER — TELEPHONE (OUTPATIENT)
Dept: CARDIAC SURGERY | Facility: CLINIC | Age: 74
End: 2019-09-05

## 2019-09-05 NOTE — TELEPHONE ENCOUNTER
Mr. Marinelli's daughter, Laura called concerned with his chest tube site. She states he has redness around tube and yellow drainage from the area. She was requesting a prescription for an antibiotic.   I told her we usually do not prescribe anything without seeing the patient first but I would ask you. He has a current appointment on 9/10/19.     Thanks  Abdirahman     Spoke with Laura 9/6/19- he is going to see his PCP this morning and they will let us know the thickness of the drainage.

## 2019-09-06 ENCOUNTER — OFFICE VISIT (OUTPATIENT)
Dept: CARDIAC SURGERY | Facility: CLINIC | Age: 74
End: 2019-09-06

## 2019-09-06 VITALS
HEIGHT: 72 IN | TEMPERATURE: 98.3 F | BODY MASS INDEX: 23.7 KG/M2 | DIASTOLIC BLOOD PRESSURE: 71 MMHG | SYSTOLIC BLOOD PRESSURE: 107 MMHG | WEIGHT: 175 LBS | HEART RATE: 102 BPM | OXYGEN SATURATION: 98 %

## 2019-09-06 DIAGNOSIS — J95.811 PNEUMOTHORAX, POSTPROCEDURAL: Primary | ICD-10-CM

## 2019-09-06 PROCEDURE — 71046 X-RAY EXAM CHEST 2 VIEWS: CPT | Performed by: THORACIC SURGERY (CARDIOTHORACIC VASCULAR SURGERY)

## 2019-09-06 PROCEDURE — 99024 POSTOP FOLLOW-UP VISIT: CPT | Performed by: PHYSICIAN ASSISTANT

## 2019-09-06 NOTE — TELEPHONE ENCOUNTER
Thin yellow drainage is normal.  Is the drainage thick and creamy?  Mild redness less that 5 mm around the tube is also normal.  He should just come in to the office with the PA's to evaluate the tube and get a CXR.

## 2019-09-06 NOTE — PROGRESS NOTES
09/06/2019  Patient Information  Angi Marinelli                                                                                          [address]   [place] [id number]   1945  'PCP/Referring Physician'  Georgiana Cortés DO  [phone]  No ref. provider found    Chief Complaint   Patient presents with   • Wound Check     Follow-up per PCP to evaluate chest tube site. Has thick yellow drainage, redness around tube.       History of Present Illness: Pleasant 74-year-old  male well-known to the service secondary to recent VATS on the right side with a right upper lobectomy for invasive moderately differentiated adenocarcinoma.  He had a prolonged hospital stay secondary to a little failure to thrive, and a prolonged air leak.  He was discharged home with a pneumo stat.  He has done fairly well at home.  The last 48 hours the daughter says she is noted an increasing amount of drainage around the chest tubes that are still present.  With that she went to her local medical doctor today who felt the drainage may be a little cloudy and started the patient on 1 dose of IV Levaquin and then 7 days of p.o. Levaquin.  She called the office this morning wanting to be seen.  The patient drove 2 hours to get here today has had a chest x-ray.  Patient denies any fever or chills.  Still has a moderately productive cough.  He is just quit smoking the last 2 weeks.  While at his primary care physician's office they did a CBC which did have an elevated white count at 14.7 thousand.  He denies any systemic symptoms of fever, chills, malaise.  His appetite is continuing to improve.  He is sleeping fairly well at night.  Bowels and urinary habits are pretty normal for him.      Patient Active Problem List   Diagnosis   • Abnormal CT of the chest   • Lung nodule < 6cm on CT   • Mediastinal lymphadenopathy   • RLL adenocarcinoma; s/p RUL lobectomy/mediastinal lymph node dissection (Dr. Nance, 8/22/19)   • COPD   •  Dyslipidemia   • Hypertension   • Restless leg syndrome   • Papillary thyroid carcinoma    • Smoker   • Non-compliance w/ home O2     Past Medical History:   Diagnosis Date   • Abnormal CT lung screening    • Arthritis     hip    • COPD (chronic obstructive pulmonary disease) (CMS/HCC)    • Full dentures    • GERD (gastroesophageal reflux disease)    • Heat stroke     x 4   • Hyperlipidemia    • Hypertension    • Lung cancer (CMS/HCC)    • Migraine    • On home oxygen therapy     given 6-2019 but has not used   • Pneumonia 2019   • PONV (postoperative nausea and vomiting)    • Restless leg syndrome    • Skin cancer     right side of face removed    • Thyroid cancer (CMS/HCC)    • Tobacco abuse    • Wears reading eyeglasses      Past Surgical History:   Procedure Laterality Date   • ARTERIAL ANEURYSM REPAIR  2008    AAA Repair by Dr. Omi Cheung   • BRONCHOSCOPY N/A 6/27/2019    Procedure: BRONCHOSCOPY WITH ENDOBRONCHIAL ULTRASOUND;  Surgeon: Iggy Beard MD;  Location:  [facility];  Service: Pulmonary   • BRONCHOSCOPY N/A 8/22/2019    Procedure: BRONCHOSCOPY;  Surgeon: Shabbir Nance MD;  Location:  [facility];  Service: Cardiothoracic   • CATARACT EXTRACTION Bilateral     Cataract extraction with IOL   • LYMPH NODE DISSECTION Right 8/22/2019    Procedure: MEDIASTINAL LYMPH NODE DISSECTION RIGHT;  Surgeon: Shabbir Nance MD;  Location:  [facility];  Service: Cardiothoracic   • SKIN BIOPSY      right face   • THORACOSCOPY Right 8/22/2019    Procedure: THORACOSCOPY VIDEO ASSISTED WITH RIGHT UPPER LOBECTOMY, INTERCOSTAL NERVE BLOCKS WITH CRYOABLATION;  Surgeon: Shabbir Nance MD;  Location:  [facility];  Service: Cardiothoracic       Current Outpatient Medications:   •  amLODIPine (NORVASC) 10 MG tablet, Take 10 mg by mouth Daily., Disp: , Rfl:   •  aspirin 325 MG tablet, Take 325 mg by mouth Daily., Disp: , Rfl:   •  Cholecalciferol (VITAMIN D3) 18535 units capsule, ergocalciferol (vitamin D2) 50,000 unit capsule   Take 1 capsule every week by oral route., Disp: , Rfl:   •  DALIRESP 500 MCG tablet tablet, Take 500 mcg by mouth Daily., Disp: , Rfl: 2  •  docusate sodium (COLACE) 100 MG capsule, Take 100 mg by mouth At Night As Needed for Constipation., Disp: , Rfl:   •  enalapril (VASOTEC) 20 MG tablet, Take 20 mg by mouth Every Night., Disp: , Rfl:   •  finasteride (PROSCAR) 5 MG tablet, Take 5 mg by mouth Daily., Disp: , Rfl:   •  HYDROcodone-acetaminophen (NORCO) 5-325 MG per tablet, Take 1 tablet by mouth 2 (Two) Times a Day., Disp: 30 tablet, Rfl: 0  •  ipratropium-albuterol (DUO-NEB) 0.5-2.5 mg/3 ml nebulizer, 3 mL Every 6 (Six) Hours As Needed for Wheezing or Shortness of Air., Disp: , Rfl:   •  levalbuterol (XOPENEX HFA) 45 MCG/ACT inhaler, Inhale 2 puffs Every 6 (Six) Hours As Needed for Wheezing or Shortness of Air., Disp: 1 inhaler, Rfl: 5  •  methocarbamol (ROBAXIN) 500 MG tablet, Take 500 mg by mouth 2 (Two) Times a Day., Disp: , Rfl:   •  montelukast (SINGULAIR) 10 MG tablet, Take 10 mg by mouth Every Night., Disp: , Rfl:   •  nicotine (NICODERM CQ) 14 MG/24HR patch, APPLY 1 PATCH AND CHANGE DAILY  DO NOT SMOKE WHILE WEARING PATCH, Disp: , Rfl: 0  •  Polyethylene Glycol 3350 (MIRALAX PO), Take 1 dose by mouth Daily., Disp: , Rfl:   •  raNITIdine (ZANTAC) 150 MG tablet, Take 150 mg by mouth 2 (Two) Times a Day., Disp: , Rfl:   •  rOPINIRole (REQUIP) 1 MG tablet, Take 1 mg by mouth Every Night. Take 1 hour before bedtime., Disp: , Rfl:   •  simvastatin (ZOCOR) 40 MG tablet, Take 40 mg by mouth Every Evening., Disp: , Rfl: 2  •  tiotropium bromide monohydrate (SPIRIVA RESPIMAT) 2.5 MCG/ACT aerosol solution inhaler, Inhale 2 puffs Daily., Disp: 1 inhaler, Rfl: 5  •  topiramate (TOPAMAX) 50 MG tablet, Take 50 mg by mouth 2 (Two) Times a Day., Disp: , Rfl:   •  vitamin B-12 (CYANOCOBALAMIN) 1000 MCG tablet, TAKE ONE TABLET BY MOUTH ONCE vdaily, Disp: , Rfl: 2  Allergies   Allergen Reactions   • Penicillins Anaphylaxis,  "Swelling and Rash     Social History     Socioeconomic History   • Marital status:      Spouse name: Not on file   • Number of children: 5   • Years of education: Not on file   • Highest education level: Not on file   Occupational History   • Occupation:  / Farmer     Comment: Retired    Tobacco Use   • Smoking status: Former Smoker     Packs/day: 1.00     Years: 57.00     Pack years: 57.00     Types: Cigarettes     Last attempt to quit: 2019     Years since quittin.0   • Smokeless tobacco: Never Used   Substance and Sexual Activity   • Alcohol use: No     Frequency: Never   • Drug use: No   • Sexual activity: Defer   Social History Narrative    Lives in [place].     Family History   Problem Relation Age of Onset   • Hypertension Mother    • Stroke Father      ROS  Vitals:    19 1447   BP: 107/71   BP Location: Left arm   Patient Position: Sitting   Pulse: 102   Temp: 98.3 °F (36.8 °C)   TempSrc: Temporal   SpO2: 98%   Weight: 79.4 kg (175 lb)   Height: 182.9 cm (72\")      Physical Exam on exam today.  His wounds are clean dry and intact.  His lungs he does still have some subcu air on his lateral right chest.  Otherwise his lungs are fairly clear.  No expiratory wheezes.  Cardiovascular without rubs murmurs or gallops.  Abdomen is soft bowel sounds present all 4 quadrants.  Labs/Imaging: Chest x-ray confirms the subcutaneous air.  He has no pneumothorax.  On good cough he still has a small air leak through the pneumo stat.    Assessment/Plan: Prolonged air leak now 2 weeks status post VATS with a right upper lobectomy.  Negative for any systemic symptoms.  Has been started on Levaquin per the primary care physician.  Chest tube still with small air leak.  Chest x-ray still with small amount of subcutaneous air.  Otherwise he is slowly improving daily.  He has a follow-up appointment next Tuesday with Dr. Nance.  Hopefully the air leak will seal by that time or possibly remove 1 " of the chest tubes.  We will see him back at that time or sooner if need develops.    Patient Active Problem List   Diagnosis   • Abnormal CT of the chest   • Lung nodule < 6cm on CT   • Mediastinal lymphadenopathy   • RLL adenocarcinoma; s/p RUL lobectomy/mediastinal lymph node dissection (Dr. Nance, 8/22/19)   • COPD   • Dyslipidemia   • Hypertension   • Restless leg syndrome   • Papillary thyroid carcinoma    • Smoker   • Non-compliance w/ home O2

## 2019-09-07 ENCOUNTER — MDT ASSESSMENT (OUTPATIENT)
Dept: ONCOLOGY | Facility: CLINIC | Age: 74
End: 2019-09-07

## 2019-09-10 ENCOUNTER — OFFICE VISIT (OUTPATIENT)
Dept: CARDIAC SURGERY | Facility: CLINIC | Age: 74
End: 2019-09-10

## 2019-09-10 VITALS
DIASTOLIC BLOOD PRESSURE: 70 MMHG | WEIGHT: 176 LBS | HEIGHT: 72 IN | HEART RATE: 103 BPM | OXYGEN SATURATION: 99 % | SYSTOLIC BLOOD PRESSURE: 101 MMHG | TEMPERATURE: 98 F | BODY MASS INDEX: 23.84 KG/M2

## 2019-09-10 DIAGNOSIS — Z90.2 S/P LOBECTOMY OF LUNG: Primary | ICD-10-CM

## 2019-09-10 PROCEDURE — 71046 X-RAY EXAM CHEST 2 VIEWS: CPT | Performed by: THORACIC SURGERY (CARDIOTHORACIC VASCULAR SURGERY)

## 2019-09-10 PROCEDURE — 99024 POSTOP FOLLOW-UP VISIT: CPT | Performed by: PHYSICIAN ASSISTANT

## 2019-09-10 RX ORDER — LEVOFLOXACIN 500 MG/1
1 TABLET, FILM COATED ORAL DAILY
Refills: 0 | COMMUNITY
Start: 2019-09-06 | End: 2021-03-31

## 2019-09-10 NOTE — PROGRESS NOTES
09/10/2019  Patient Information  Angi Marinelli                                                                                          [address] [place]   [facility] [address]   1945  'PCP/Referring Physician'  Georgiana Cortés DO  [phone]  No ref. provider found    Chief Complaint   Patient presents with   • Chest Injury     Hospital follow-up s/p right VATS, with right upper lobectomy. Patient states area around chest tube is looking better, no longer draining.       History of Present Illness:     Patient is a 74-year-old  male with history of right lung cancer status post right upper lobectomy and mediastinal lymph node dissection on 8/22/2019 presents to office today for evaluation of bilateral right chest tubes with pneumostat in place.  Patient was last seen in our office on 9/6/2019 for evaluation of drainage around chest tube sites.  Patient was seen prior to that by primary care provider and treated with IV Levaquin as well as 7-day course of p.o. Levaquin for a reported chest tube site infection.  Today patient states resolution of drainage from around chest tube sites and also a decrease in the amount of fluid aspirated from the pneumostat.  Patient denies shortness of breath, fever, chills, night sweats or recent weight loss. He is followed closely by his medical oncologist Dr. Liddle in [place], [place].     Patient Active Problem List   Diagnosis   • Abnormal CT of the chest   • Lung nodule < 6cm on CT   • Mediastinal lymphadenopathy   • RLL adenocarcinoma; s/p RUL lobectomy/mediastinal lymph node dissection (Dr. Nance, 8/22/19)   • COPD   • Dyslipidemia   • Hypertension   • Restless leg syndrome   • Papillary thyroid carcinoma    • Smoker   • Non-compliance w/ home O2     Past Medical History:   Diagnosis Date   • Abnormal CT lung screening    • Arthritis     hip    • COPD (chronic obstructive pulmonary disease) (CMS/[facility])    • Full dentures    • GERD (gastroesophageal reflux  disease)    • Heat stroke     x 4   • Hyperlipidemia    • Hypertension    • Lung cancer (CMS/HCC)    • Migraine    • On home oxygen therapy     given 6-2019 but has not used   • Pneumonia 2019   • PONV (postoperative nausea and vomiting)    • Restless leg syndrome    • Skin cancer     right side of face removed    • Thyroid cancer (CMS/HCC)    • Tobacco abuse    • Wears reading eyeglasses      Past Surgical History:   Procedure Laterality Date   • ARTERIAL ANEURYSM REPAIR  2008    AAA Repair by Dr. Omi Cheung   • BRONCHOSCOPY N/A 6/27/2019    Procedure: BRONCHOSCOPY WITH ENDOBRONCHIAL ULTRASOUND;  Surgeon: Iggy Beard MD;  Location:  [facility];  Service: Pulmonary   • BRONCHOSCOPY N/A 8/22/2019    Procedure: BRONCHOSCOPY;  Surgeon: Shabbir Nance MD;  Location:  [facility];  Service: Cardiothoracic   • CATARACT EXTRACTION Bilateral     Cataract extraction with IOL   • LYMPH NODE DISSECTION Right 8/22/2019    Procedure: MEDIASTINAL LYMPH NODE DISSECTION RIGHT;  Surgeon: Shabbir Nance MD;  Location:  [facility];  Service: Cardiothoracic   • SKIN BIOPSY      right face   • THORACOSCOPY Right 8/22/2019    Procedure: THORACOSCOPY VIDEO ASSISTED WITH RIGHT UPPER LOBECTOMY, INTERCOSTAL NERVE BLOCKS WITH CRYOABLATION;  Surgeon: Shabbir Nance MD;  Location:  [facility];  Service: Cardiothoracic       Current Outpatient Medications:   •  amLODIPine (NORVASC) 10 MG tablet, Take 10 mg by mouth Daily., Disp: , Rfl:   •  aspirin 325 MG tablet, Take 325 mg by mouth Daily., Disp: , Rfl:   •  Cholecalciferol (VITAMIN D3) 17431 units capsule, ergocalciferol (vitamin D2) 50,000 unit capsule  Take 1 capsule every week by oral route., Disp: , Rfl:   •  DALIRESP 500 MCG tablet tablet, Take 500 mcg by mouth Daily., Disp: , Rfl: 2  •  docusate sodium (COLACE) 100 MG capsule, Take 100 mg by mouth At Night As Needed for Constipation., Disp: , Rfl:   •  enalapril (VASOTEC) 20 MG tablet, Take 20 mg by mouth Every Night., Disp: , Rfl:    •  finasteride (PROSCAR) 5 MG tablet, Take 5 mg by mouth Daily., Disp: , Rfl:   •  HYDROcodone-acetaminophen (NORCO) 5-325 MG per tablet, Take 1 tablet by mouth 2 (Two) Times a Day., Disp: 30 tablet, Rfl: 0  •  ipratropium-albuterol (DUO-NEB) 0.5-2.5 mg/3 ml nebulizer, 3 mL Every 6 (Six) Hours As Needed for Wheezing or Shortness of Air., Disp: , Rfl:   •  levalbuterol (XOPENEX HFA) 45 MCG/ACT inhaler, Inhale 2 puffs Every 6 (Six) Hours As Needed for Wheezing or Shortness of Air., Disp: 1 inhaler, Rfl: 5  •  levoFLOXacin (LEVAQUIN) 500 MG tablet, Take 1 tablet by mouth Daily., Disp: , Rfl: 0  •  methocarbamol (ROBAXIN) 500 MG tablet, Take 500 mg by mouth 2 (Two) Times a Day., Disp: , Rfl:   •  montelukast (SINGULAIR) 10 MG tablet, Take 10 mg by mouth Every Night., Disp: , Rfl:   •  nicotine (NICODERM CQ) 14 MG/24HR patch, APPLY 1 PATCH AND CHANGE DAILY  DO NOT SMOKE WHILE WEARING PATCH, Disp: , Rfl: 0  •  Polyethylene Glycol 3350 (MIRALAX PO), Take 1 dose by mouth Daily., Disp: , Rfl:   •  raNITIdine (ZANTAC) 150 MG tablet, Take 150 mg by mouth 2 (Two) Times a Day., Disp: , Rfl:   •  rOPINIRole (REQUIP) 1 MG tablet, Take 1 mg by mouth Every Night. Take 1 hour before bedtime., Disp: , Rfl:   •  simvastatin (ZOCOR) 40 MG tablet, Take 40 mg by mouth Every Evening., Disp: , Rfl: 2  •  tiotropium bromide monohydrate (SPIRIVA RESPIMAT) 2.5 MCG/ACT aerosol solution inhaler, Inhale 2 puffs Daily., Disp: 1 inhaler, Rfl: 5  •  topiramate (TOPAMAX) 50 MG tablet, Take 50 mg by mouth 2 (Two) Times a Day., Disp: , Rfl:   •  vitamin B-12 (CYANOCOBALAMIN) 1000 MCG tablet, TAKE ONE TABLET BY MOUTH ONCE vdaily, Disp: , Rfl: 2  Allergies   Allergen Reactions   • Penicillins Anaphylaxis, Swelling and Rash     Social History     Socioeconomic History   • Marital status:      Spouse name: Not on file   • Number of children: 5   • Years of education: Not on file   • Highest education level: Not on file   Occupational History   •  "Occupation:  / [employer]     Comment: Retired    Tobacco Use   • Smoking status: Former Smoker     Packs/day: 1.00     Years: 57.00     Pack years: 57.00     Types: Cigarettes     Last attempt to quit: 2019     Years since quittin.0   • Smokeless tobacco: Never Used   Substance and Sexual Activity   • Alcohol use: No     Frequency: Never   • Drug use: No   • Sexual activity: Defer   Social History Narrative    Lives in [place].     Family History   Problem Relation Age of Onset   • Hypertension Mother    • Stroke Father      ROS  Vitals:    09/10/19 1023   BP: 101/70   BP Location: Left arm   Patient Position: Sitting   Pulse: 103   Temp: 98 °F (36.7 °C)   TempSrc: Temporal   SpO2: 99%   Weight: 79.8 kg (176 lb)   Height: 182.9 cm (72\")      Physical Exam     Gen - NAD, pleasant, cooperative  CV -regular rate and rhythm, no murmur gallop or rub  Pulm - Subcutaneous air in right lateral lung field. Otherwise respirations clear, no wheezes, rales or rhonchi  GI -soft, normoactive bowel sounds, nontender  Ext - without edema.    Incision -healing well, no evidence of incisional dehiscence or cellulitis  Neuro - CN II - XII grossly intact, tongue midline, voice normal      Labs/Imaging:    CXR - chest xray taken in office today shows a decrease in the amount of subcutaneous air in the right lateral chest when compared to the radiograph from his 19 office visit. No evidence of significant effusion or pneumothorax. This is a stable chest xray.    Assessment/Plan:    Patient is a 74-year-old  male with history of right lung cancer status post right upper lobectomy and mediastinal lymph node dissection on 2019 presents to office today for evaluation of bilateral right chest tubes with pneumostat in place. No sign of air leak present today in the pneumostat. Both right chest tubes removed today by myself and duoderm dressing applied to chest tube sites. Patient scheduled for a repeat CT " scan of the chest in 6 months with office follow-up. We also set up an appointment with Dr. Maxwell's office here at [facility] for workup for thyroid cancer.The patient should call with any questions or concerns should any arise in the interval. Should the patient experience any acutely worsening chest pain or shortness of breath, he should present to the nearest emergency department immediately.    Patient Active Problem List   Diagnosis   • Abnormal CT of the chest   • Lung nodule < 6cm on CT   • Mediastinal lymphadenopathy   • RLL adenocarcinoma; s/p RUL lobectomy/mediastinal lymph node dissection (Dr. Nance, 8/22/19)   • COPD   • Dyslipidemia   • Hypertension   • Restless leg syndrome   • Papillary thyroid carcinoma    • Smoker   • Non-compliance w/ home O2

## 2019-09-18 ENCOUNTER — TRANSCRIBE ORDERS (OUTPATIENT)
Dept: LAB | Facility: HOSPITAL | Age: 74
End: 2019-09-18

## 2019-09-18 ENCOUNTER — LAB (OUTPATIENT)
Dept: LAB | Facility: HOSPITAL | Age: 74
End: 2019-09-18

## 2019-09-18 DIAGNOSIS — C34.90 MALIGNANT NEOPLASM OF BRONCHUS AND LUNG (HCC): ICD-10-CM

## 2019-09-18 DIAGNOSIS — C34.90 MALIGNANT NEOPLASM OF BRONCHUS AND LUNG (HCC): Primary | ICD-10-CM

## 2019-09-18 LAB
ALBUMIN SERPL-MCNC: 3.6 G/DL (ref 3.5–5.2)
ALBUMIN/GLOB SERPL: 1.1 G/DL
ALP SERPL-CCNC: 80 U/L (ref 39–117)
ALT SERPL W P-5'-P-CCNC: 12 U/L (ref 1–41)
ANION GAP SERPL CALCULATED.3IONS-SCNC: 13.6 MMOL/L (ref 5–15)
AST SERPL-CCNC: 15 U/L (ref 1–40)
BASOPHILS NFR BLD AUTO: 1 % (ref 0–1.5)
BILIRUB SERPL-MCNC: <0.2 MG/DL (ref 0.2–1.2)
BUN BLD-MCNC: 17 MG/DL (ref 8–23)
BUN/CREAT SERPL: 12.9 (ref 7–25)
CALCIUM SPEC-SCNC: 9.7 MG/DL (ref 8.6–10.5)
CHLORIDE SERPL-SCNC: 108 MMOL/L (ref 98–107)
CO2 SERPL-SCNC: 22.4 MMOL/L (ref 22–29)
CREAT BLD-MCNC: 1.32 MG/DL (ref 0.76–1.27)
DEPRECATED RDW RBC AUTO: 48.4 FL (ref 37–54)
EOSINOPHIL NFR BLD MANUAL: 3 % (ref 0.3–6.2)
ERYTHROCYTE [DISTWIDTH] IN BLOOD BY AUTOMATED COUNT: 14.4 % (ref 12.3–15.4)
GFR SERPL CREATININE-BSD FRML MDRD: 53 ML/MIN/1.73
GLOBULIN UR ELPH-MCNC: 3.3 GM/DL
GLUCOSE BLD-MCNC: 125 MG/DL (ref 65–99)
HCT VFR BLD AUTO: 35.6 % (ref 37.5–51)
HGB BLD-MCNC: 10.9 G/DL (ref 13–17.7)
LARGE PLATELETS: NORMAL
LYMPHOCYTES # BLD MANUAL: NORMAL 10*3/UL
LYMPHOCYTES NFR BLD MANUAL: 21 % (ref 19.6–45.3)
LYMPHOCYTES NFR BLD MANUAL: 6 % (ref 5–12)
MCH RBC QN AUTO: 28.3 PG (ref 26.6–33)
MCHC RBC AUTO-ENTMCNC: 30.6 G/DL (ref 31.5–35.7)
MCV RBC AUTO: 92.5 FL (ref 79–97)
NEUTROPHILS # BLD AUTO: NORMAL 10*3/UL
NEUTROPHILS NFR BLD MANUAL: 68 % (ref 42.7–76)
PLATELET # BLD AUTO: 342 10*3/MM3 (ref 140–450)
PMV BLD AUTO: 10.4 FL (ref 6–12)
POTASSIUM BLD-SCNC: 3.9 MMOL/L (ref 3.5–5.2)
PROT SERPL-MCNC: 6.9 G/DL (ref 6–8.5)
RBC # BLD AUTO: 3.85 10*6/MM3 (ref 4.14–5.8)
RBC MORPH BLD: NORMAL
SMALL PLATELETS BLD QL SMEAR: ADEQUATE
SODIUM BLD-SCNC: 144 MMOL/L (ref 136–145)
VARIANT LYMPHS NFR BLD MANUAL: 1 % (ref 0–5)
WBC MORPH BLD: NORMAL
WBC NRBC COR # BLD: 12.36 10*3/MM3 (ref 3.4–10.8)

## 2019-09-18 PROCEDURE — 85027 COMPLETE CBC AUTOMATED: CPT

## 2019-09-18 PROCEDURE — 36415 COLL VENOUS BLD VENIPUNCTURE: CPT

## 2019-09-18 PROCEDURE — 85007 BL SMEAR W/DIFF WBC COUNT: CPT

## 2019-09-18 PROCEDURE — 80053 COMPREHEN METABOLIC PANEL: CPT

## 2019-09-23 ENCOUNTER — OFFICE VISIT (OUTPATIENT)
Dept: PULMONOLOGY | Facility: CLINIC | Age: 74
End: 2019-09-23

## 2019-09-23 VITALS
HEIGHT: 72 IN | RESPIRATION RATE: 18 BRPM | BODY MASS INDEX: 24.38 KG/M2 | SYSTOLIC BLOOD PRESSURE: 118 MMHG | WEIGHT: 180 LBS | HEART RATE: 74 BPM | DIASTOLIC BLOOD PRESSURE: 70 MMHG

## 2019-09-23 DIAGNOSIS — G47.52 DREAM ENACTMENT BEHAVIOR: ICD-10-CM

## 2019-09-23 DIAGNOSIS — F51.3 SLEEPWALKING: ICD-10-CM

## 2019-09-23 DIAGNOSIS — J44.9 CHRONIC OBSTRUCTIVE PULMONARY DISEASE, UNSPECIFIED COPD TYPE (HCC): ICD-10-CM

## 2019-09-23 DIAGNOSIS — R06.83 SNORING: ICD-10-CM

## 2019-09-23 DIAGNOSIS — Z87.891 QUIT SMOKING WITHIN PAST YEAR: ICD-10-CM

## 2019-09-23 DIAGNOSIS — C34.91 ADENOCARCINOMA OF LUNG, RIGHT (HCC): Primary | ICD-10-CM

## 2019-09-23 PROCEDURE — 99214 OFFICE O/P EST MOD 30 MIN: CPT | Performed by: INTERNAL MEDICINE

## 2019-09-23 RX ORDER — CLONAZEPAM 0.5 MG/1
0.5 TABLET ORAL NIGHTLY
Qty: 30 TABLET | Refills: 3 | Status: SHIPPED | OUTPATIENT
Start: 2019-09-23 | End: 2020-01-06 | Stop reason: SDUPTHER

## 2019-09-23 NOTE — PROGRESS NOTES
"Chief Complaint   Patient presents with   • Follow-up   • Breathing Problem         Subjective   Angi Marinelli is a 74 y.o. male.     History of Present Illness   Patient comes in today to follow-up after he underwent surgery for adenocarcinoma.    The patient says that he has done fairly well apart from minimal pain at the site of surgery.    The patient continues to use Spiriva regularly and actually feels that it does help his breathing.  He is also quit smoking and has not smoked at all since the day of surgery.    The patient's family members mention his tendency to sleep walk.  The patient's family member say that he has done this almost for the past 30 years or so but lately it seems to have gotten worse.  He also has multiple episodes where he acts out his dreams.    He denies any daytime sleepiness or tiredness.  The patient's family member also noticed only minimal snoring.    The following portions of the patient's history were reviewed and updated as appropriate: allergies, current medications, past family history, past medical history, past social history and past surgical history.    Review of Systems   HENT: Negative for sinus pressure, sneezing and sore throat.    Respiratory: Positive for cough. Negative for shortness of breath and wheezing.        Objective   Visit Vitals  /70   Pulse 74   Resp 18   Ht 182.9 cm (72.01\")   Wt 81.6 kg (180 lb)   BMI 24.41 kg/m²       Physical Exam   Constitutional: He is oriented to person, place, and time. He appears well-developed and well-nourished.   Eyes: EOM are normal.   Neck: Neck supple.   Cardiovascular: Normal rate and regular rhythm.   Pulmonary/Chest: Effort normal. No respiratory distress.   Right sided surgical scars noted.   Somewhat hyperresonant to percussion  Somewhat decreased A/E with out wheezing noted.   Musculoskeletal: He exhibits no edema.   Neurological: He is alert and oriented to person, place, and time.   Skin: Skin is warm and " dry.   Psychiatric: He has a normal mood and affect.   Vitals reviewed.        Assessment/Plan   Angi was seen today for follow-up and breathing problem.    Diagnoses and all orders for this visit:    Adenocarcinoma of lung, right (CMS/HCC)    Chronic obstructive pulmonary disease, unspecified COPD type (CMS/HCC)    Quit smoking within past year    Dream enactment behavior    Snoring    Sleepwalking    Other orders  -     clonazePAM (KLONOPIN) 0.5 MG tablet; Take 1 tablet by mouth Every Night.           Return in about 12 weeks (around 12/16/2019) for Recheck, Overbook.    DISCUSSION (if any):  I reviewed the patient's pathology results in great detail.  The pathology results showed the diameter of the tumor to be around 3.2 cm with negative margins.  Overall staging was stage Ib.    The patient is following Dr. Liddle and I advised him to continue close follow-up with her.    Last PFTs showed mild COPD.  This was performed a few months ago.    We have reviewed his pulmonary medications in great detail.    Any needed adjustments to his pulmonary medications, either for clinical or insurance coverage reasons, have been made and are reflected in the orders.    Compliance with medications stressed.     Side effects of prescribed medications discussed with the patient    Patient was strongly encouraged to stay quit from smoking as soon as possible.    The patient appears to have dream enactment also there seems to be some evidence to suggest REM behavior disorder therefore I have started him on a low-dose Klonopin trial.  He will be started at 0.5 mg nightly.    Side effects were discussed with the patient and the family members.    Ashwin will be reviewed.     We will try to see him on the same day that he sees Dr. Liddle.       Dictated utilizing Dragon dictation.    This document was electronically signed by Iggy Beard MD on 09/23/19 at 11:03 AM

## 2019-11-06 ENCOUNTER — LAB (OUTPATIENT)
Dept: LAB | Facility: HOSPITAL | Age: 74
End: 2019-11-06

## 2019-11-06 ENCOUNTER — HOSPITAL ENCOUNTER (OUTPATIENT)
Dept: CARDIOLOGY | Facility: HOSPITAL | Age: 74
Discharge: HOME | End: 2019-11-06
Admitting: OTOLARYNGOLOGY

## 2019-11-06 ENCOUNTER — TRANSCRIBE ORDERS (OUTPATIENT)
Dept: LAB | Facility: HOSPITAL | Age: 74
End: 2019-11-06

## 2019-11-06 DIAGNOSIS — C34.90 MALIGNANT NEOPLASM OF BRONCHUS AND LUNG (HCC): Primary | ICD-10-CM

## 2019-11-06 DIAGNOSIS — Z01.812 PRE-OPERATIVE LABORATORY EXAMINATION: ICD-10-CM

## 2019-11-06 DIAGNOSIS — C34.90 MALIGNANT NEOPLASM OF BRONCHUS AND LUNG (HCC): ICD-10-CM

## 2019-11-06 DIAGNOSIS — C73 MALIGNANT NEOPLASM OF THYROID GLAND (HCC): ICD-10-CM

## 2019-11-06 LAB
ALBUMIN SERPL-MCNC: 3.9 G/DL (ref 3.5–5.2)
ALBUMIN/GLOB SERPL: 1.1 G/DL
ALP SERPL-CCNC: 75 U/L (ref 39–117)
ALT SERPL W P-5'-P-CCNC: 11 U/L (ref 1–41)
ANION GAP SERPL CALCULATED.3IONS-SCNC: 5.5 MMOL/L (ref 5–15)
AST SERPL-CCNC: 14 U/L (ref 1–40)
BILIRUB SERPL-MCNC: 0.2 MG/DL (ref 0.2–1.2)
BUN BLD-MCNC: 16 MG/DL (ref 8–23)
BUN/CREAT SERPL: 12.3 (ref 7–25)
CALCIUM SPEC-SCNC: 9.8 MG/DL (ref 8.6–10.5)
CHLORIDE SERPL-SCNC: 111 MMOL/L (ref 98–107)
CO2 SERPL-SCNC: 29.5 MMOL/L (ref 22–29)
CREAT BLD-MCNC: 1.3 MG/DL (ref 0.76–1.27)
DEPRECATED RDW RBC AUTO: 44 FL (ref 37–54)
ERYTHROCYTE [DISTWIDTH] IN BLOOD BY AUTOMATED COUNT: 13.6 % (ref 12.3–15.4)
GFR SERPL CREATININE-BSD FRML MDRD: 54 ML/MIN/1.73
GLOBULIN UR ELPH-MCNC: 3.5 GM/DL
GLUCOSE BLD-MCNC: 102 MG/DL (ref 65–99)
HCT VFR BLD AUTO: 36.5 % (ref 37.5–51)
HGB BLD-MCNC: 11.4 G/DL (ref 13–17.7)
MCH RBC QN AUTO: 27.3 PG (ref 26.6–33)
MCHC RBC AUTO-ENTMCNC: 31.2 G/DL (ref 31.5–35.7)
MCV RBC AUTO: 87.3 FL (ref 79–97)
PLATELET # BLD AUTO: 271 10*3/MM3 (ref 140–450)
PMV BLD AUTO: 10.4 FL (ref 6–12)
POTASSIUM BLD-SCNC: 4.3 MMOL/L (ref 3.5–5.2)
PROT SERPL-MCNC: 7.4 G/DL (ref 6–8.5)
RBC # BLD AUTO: 4.18 10*6/MM3 (ref 4.14–5.8)
SODIUM BLD-SCNC: 146 MMOL/L (ref 136–145)
WBC NRBC COR # BLD: 9.96 10*3/MM3 (ref 3.4–10.8)

## 2019-11-06 PROCEDURE — 80053 COMPREHEN METABOLIC PANEL: CPT

## 2019-11-06 PROCEDURE — 36415 COLL VENOUS BLD VENIPUNCTURE: CPT

## 2019-11-06 PROCEDURE — 85027 COMPLETE CBC AUTOMATED: CPT

## 2019-11-06 PROCEDURE — 93005 ELECTROCARDIOGRAM TRACING: CPT | Performed by: OTOLARYNGOLOGY

## 2020-01-06 ENCOUNTER — OFFICE VISIT (OUTPATIENT)
Dept: PULMONOLOGY | Facility: CLINIC | Age: 75
End: 2020-01-06

## 2020-01-06 VITALS
OXYGEN SATURATION: 98 % | BODY MASS INDEX: 24.52 KG/M2 | SYSTOLIC BLOOD PRESSURE: 116 MMHG | DIASTOLIC BLOOD PRESSURE: 70 MMHG | WEIGHT: 181 LBS | RESPIRATION RATE: 18 BRPM | HEIGHT: 72 IN | HEART RATE: 70 BPM

## 2020-01-06 DIAGNOSIS — C34.91 ADENOCARCINOMA OF LUNG, RIGHT (HCC): ICD-10-CM

## 2020-01-06 DIAGNOSIS — J44.9 CHRONIC OBSTRUCTIVE PULMONARY DISEASE, UNSPECIFIED COPD TYPE (HCC): ICD-10-CM

## 2020-01-06 DIAGNOSIS — R06.02 SOB (SHORTNESS OF BREATH): Primary | ICD-10-CM

## 2020-01-06 DIAGNOSIS — G47.52 DREAM ENACTMENT BEHAVIOR: ICD-10-CM

## 2020-01-06 DIAGNOSIS — Z87.891 QUIT SMOKING WITHIN PAST YEAR: ICD-10-CM

## 2020-01-06 DIAGNOSIS — F51.3 SLEEPWALKING: ICD-10-CM

## 2020-01-06 PROCEDURE — 99214 OFFICE O/P EST MOD 30 MIN: CPT | Performed by: INTERNAL MEDICINE

## 2020-01-06 RX ORDER — FERROUS SULFATE 325(65) MG
325 TABLET ORAL
COMMUNITY
End: 2021-03-31

## 2020-01-06 RX ORDER — CLONAZEPAM 0.5 MG/1
0.5 TABLET ORAL NIGHTLY
Qty: 30 TABLET | Refills: 3 | Status: SHIPPED | OUTPATIENT
Start: 2020-01-06

## 2020-01-06 RX ORDER — SERTRALINE HYDROCHLORIDE 25 MG/1
25 TABLET, FILM COATED ORAL DAILY
COMMUNITY

## 2020-01-06 NOTE — PROGRESS NOTES
"Chief Complaint   Patient presents with   • Follow-up   • Breathing Problem       Subjective   Angi Marinelli is a 74 y.o. male.     History of Present Illness   Patient comes today for follow up of shortness of breath and COPD.     Patient says that his symptoms have been stable since the last clinic visit. he reports no recent exacerbations.     Patient is using medications, as prescribed. Exercise tolerance has also remained stable.     Quit smoking 1 year ago.     He has not had any further sleep walking. Is taking his Klonopin 0.5 mg PO QHS.    He did not need chemo for NSCLC, as per Dr. Liddle.     His thyroid cancer was also removed recently and he has not needed any treatment for it either.       The following portions of the patient's history were reviewed and updated as appropriate: allergies, current medications, past family history, past medical history, past social history and past surgical history.    Review of Systems   HENT: Negative for sinus pressure, sneezing and sore throat.    Respiratory: Negative for cough, shortness of breath and wheezing.        Objective   Visit Vitals  /70   Pulse 70   Resp 18   Ht 182.9 cm (72.01\")   Wt 82.1 kg (181 lb)   SpO2 98%   BMI 24.54 kg/m²       Physical Exam   Constitutional: He is oriented to person, place, and time. He appears well-developed and well-nourished.   HENT:   Scar noted.    Eyes: EOM are normal.   Neck: Neck supple.   Cardiovascular: Normal rate and regular rhythm.   Pulmonary/Chest: Effort normal. No respiratory distress.   Right sided surgical scars noted.   Somewhat hyperresonant to percussion  Somewhat decreased A/E with out wheezing noted.   Musculoskeletal: He exhibits no edema.   Neurological: He is alert and oriented to person, place, and time.   Skin: Skin is warm and dry.   Psychiatric: He has a normal mood and affect.   Vitals reviewed.      Assessment/Plan   Angi was seen today for follow-up and breathing problem.    Diagnoses " and all orders for this visit:    SOB (shortness of breath)    Sleepwalking  -     clonazePAM (KLONOPIN) 0.5 MG tablet; Take 1 tablet by mouth Every Night.    Dream enactment behavior  -     clonazePAM (KLONOPIN) 0.5 MG tablet; Take 1 tablet by mouth Every Night.    Chronic obstructive pulmonary disease, unspecified COPD type (CMS/HCC)    Quit smoking within past year    Adenocarcinoma of lung, right (CMS/HCC)    Other orders  -     tiotropium bromide monohydrate (SPIRIVA RESPIMAT) 2.5 MCG/ACT aerosol solution inhaler; Inhale 2 puffs Daily.         Return in about 4 months (around 5/6/2020) for Recheck.    DISCUSSION (if any):  Last PFTs showed Moderate COPD.  These were performed in June 2019.    We have reviewed his pulmonary medications in great detail.    Any needed adjustments to his pulmonary medications, either for clinical or insurance coverage reasons, have been made and are reflected in the orders.    Will D/C Daliresp, as he only has moderate COPD.     Compliance with medications stressed.     Side effects of prescribed medications discussed with the patient    Patient was strongly encouraged to stay quit from smoking.    He is being followed by Dr. Liddle for NSCLC.    He will need surveillance CT scans.    Continue Klonipin for RBD/Sleepwalking.    Ashwin was reviewed.           Dictated utilizing Dragon dictation.    This document was electronically signed by Iggy Beard MD on 01/06/20 at 3:43 PM

## 2020-01-10 DIAGNOSIS — R06.02 SOB (SHORTNESS OF BREATH): Primary | ICD-10-CM

## 2020-01-10 DIAGNOSIS — J44.9 CHRONIC OBSTRUCTIVE PULMONARY DISEASE, UNSPECIFIED COPD TYPE (HCC): ICD-10-CM

## 2020-03-12 ENCOUNTER — TELEPHONE (OUTPATIENT)
Dept: CARDIAC SURGERY | Facility: CLINIC | Age: 75
End: 2020-03-12

## 2020-03-12 DIAGNOSIS — C34.31 MALIGNANT NEOPLASM OF LOWER LOBE OF RIGHT LUNG (HCC): Primary | ICD-10-CM

## 2020-05-27 DIAGNOSIS — IMO0001 LUNG NODULE < 6CM ON CT: Primary | ICD-10-CM

## 2020-05-27 DIAGNOSIS — C34.31 MALIGNANT NEOPLASM OF LOWER LOBE OF RIGHT LUNG (HCC): ICD-10-CM

## 2020-07-28 ENCOUNTER — HOSPITAL ENCOUNTER (OUTPATIENT)
Dept: CT IMAGING | Facility: HOSPITAL | Age: 75
Discharge: HOME | End: 2020-07-28
Admitting: PHYSICIAN ASSISTANT

## 2020-07-28 DIAGNOSIS — IMO0001 LUNG NODULE < 6CM ON CT: ICD-10-CM

## 2020-07-28 DIAGNOSIS — C34.31 MALIGNANT NEOPLASM OF LOWER LOBE OF RIGHT LUNG (HCC): ICD-10-CM

## 2020-07-28 LAB — CREAT BLDA-MCNC: 1.4 MG/DL (ref 0.6–1.3)

## 2020-07-28 PROCEDURE — 25010000002 IOPAMIDOL 61 % SOLUTION: Performed by: PHYSICIAN ASSISTANT

## 2020-07-28 PROCEDURE — 82565 ASSAY OF CREATININE: CPT

## 2020-07-28 PROCEDURE — 71260 CT THORAX DX C+: CPT

## 2020-07-28 RX ADMIN — IOPAMIDOL 95 ML: 612 INJECTION, SOLUTION INTRAVENOUS at 10:10

## 2020-08-04 ENCOUNTER — OFFICE VISIT (OUTPATIENT)
Dept: CARDIAC SURGERY | Facility: CLINIC | Age: 75
End: 2020-08-04

## 2020-08-04 VITALS
HEIGHT: 72 IN | OXYGEN SATURATION: 97 % | DIASTOLIC BLOOD PRESSURE: 92 MMHG | HEART RATE: 62 BPM | BODY MASS INDEX: 24.52 KG/M2 | TEMPERATURE: 97.8 F | SYSTOLIC BLOOD PRESSURE: 154 MMHG | WEIGHT: 181 LBS

## 2020-08-04 DIAGNOSIS — C34.31 MALIGNANT NEOPLASM OF LOWER LOBE OF RIGHT LUNG (HCC): Primary | ICD-10-CM

## 2020-08-04 PROCEDURE — 99213 OFFICE O/P EST LOW 20 MIN: CPT | Performed by: NURSE PRACTITIONER

## 2020-08-04 NOTE — PROGRESS NOTES
08/04/2020  Patient Information  Angi Marinelli                                                                                          [address]   [place] [id number]   1945  'PCP/Referring Physician'  LamontsafiaMiguelinaGeorgiana, DO  [phone]  No ref. provider found    Chief Complaint   Patient presents with   • Follow-up     Follow up for lung cancer to discuss CT chest results.   • Lung Cancer       History of Present Illness:  Patient is a 75-year-old  male with history of right lung cancer status post right upper lobectomy and mediastinal lymph node dissection on 8/22/2019 revealing stage Ib adenocarcinoma who returns the office today for follow-up exam.  The patient was last seen in our office on 9/10/2019 and returns the office today to discuss the results of his recent CT scan.  At the time of his last visit, the patient was referred to Dr. Sánchez for evaluation of thyroid cancer and the patient has undergone partial thyroidectomy.  He denies fever, chills, weight loss, night sweats and hemoptysis.  Otherwise, he is doing well.    Patient Active Problem List   Diagnosis   • Abnormal CT of the chest   • Lung nodule < 6cm on CT   • Mediastinal lymphadenopathy   • RLL adenocarcinoma; s/p RUL lobectomy/mediastinal lymph node dissection (Dr. Nance, 8/22/19)   • COPD   • Dyslipidemia   • Hypertension   • Restless leg syndrome   • Papillary thyroid carcinoma    • Smoker   • Non-compliance w/ home O2     Past Medical History:   Diagnosis Date   • Abnormal CT lung screening    • Arthritis     hip    • COPD (chronic obstructive pulmonary disease) (CMS/HCC)    • Full dentures    • GERD (gastroesophageal reflux disease)    • Heat stroke     x 4   • Hyperlipidemia    • Hypertension    • Lung cancer (CMS/HCC)    • Migraine    • On home oxygen therapy     given 6-2019 but has not used   • Pneumonia 2019   • PONV (postoperative nausea and vomiting)    • Restless leg syndrome    • Skin cancer     right  side of face removed    • Thyroid cancer (CMS/HCC)    • Tobacco abuse    • Wears reading eyeglasses      Past Surgical History:   Procedure Laterality Date   • ARTERIAL ANEURYSM REPAIR  2008    AAA Repair by Dr. Omi Cheung   • BRONCHOSCOPY N/A 6/27/2019    Procedure: BRONCHOSCOPY WITH ENDOBRONCHIAL ULTRASOUND;  Surgeon: Iggy Beard MD;  Location:  [facility];  Service: Pulmonary   • BRONCHOSCOPY N/A 8/22/2019    Procedure: BRONCHOSCOPY;  Surgeon: Shabbir Nance MD;  Location:  [facility];  Service: Cardiothoracic   • CATARACT EXTRACTION Bilateral     Cataract extraction with IOL   • LYMPH NODE DISSECTION Right 8/22/2019    Procedure: MEDIASTINAL LYMPH NODE DISSECTION RIGHT;  Surgeon: Shabbir Nance MD;  Location:  [facility];  Service: Cardiothoracic   • SKIN BIOPSY      right face   • THORACOSCOPY Right 8/22/2019    Procedure: THORACOSCOPY VIDEO ASSISTED WITH RIGHT UPPER LOBECTOMY, INTERCOSTAL NERVE BLOCKS WITH CRYOABLATION;  Surgeon: Shabbir Nance MD;  Location:  [facility];  Service: Cardiothoracic   • THYROIDECTOMY, PARTIAL Right 11/13/2019    lymph nodes also       Current Outpatient Medications:   •  amLODIPine (NORVASC) 10 MG tablet, Take 10 mg by mouth Daily., Disp: , Rfl:   •  aspirin 325 MG tablet, Take 325 mg by mouth Daily., Disp: , Rfl:   •  Cholecalciferol (VITAMIN D3) 20013 units capsule, ergocalciferol (vitamin D2) 50,000 unit capsule  Take 1 capsule every week by oral route., Disp: , Rfl:   •  clonazePAM (KLONOPIN) 0.5 MG tablet, Take 1 tablet by mouth Every Night., Disp: 30 tablet, Rfl: 3  •  docusate sodium (COLACE) 100 MG capsule, Take 100 mg by mouth At Night As Needed for Constipation., Disp: , Rfl:   •  enalapril (VASOTEC) 20 MG tablet, Take 20 mg by mouth Every Night., Disp: , Rfl:   •  ferrous sulfate 325 (65 FE) MG tablet, Take 325 mg by mouth Daily With Breakfast., Disp: , Rfl:   •  finasteride (PROSCAR) 5 MG tablet, Take 5 mg by mouth Daily., Disp: , Rfl:   •   HYDROcodone-acetaminophen (NORCO) 5-325 MG per tablet, Take 1 tablet by mouth 2 (Two) Times a Day., Disp: 30 tablet, Rfl: 0  •  ipratropium-albuterol (DUO-NEB) 0.5-2.5 mg/3 ml nebulizer, 3 mL Every 6 (Six) Hours As Needed for Wheezing or Shortness of Air., Disp: , Rfl:   •  levalbuterol (XOPENEX HFA) 45 MCG/ACT inhaler, Inhale 2 puffs Every 6 (Six) Hours As Needed for Wheezing or Shortness of Air., Disp: 1 inhaler, Rfl: 5  •  levoFLOXacin (LEVAQUIN) 500 MG tablet, Take 1 tablet by mouth Daily., Disp: , Rfl: 0  •  methocarbamol (ROBAXIN) 500 MG tablet, Take 500 mg by mouth 2 (Two) Times a Day., Disp: , Rfl:   •  montelukast (SINGULAIR) 10 MG tablet, Take 10 mg by mouth Every Night., Disp: , Rfl:   •  nicotine (NICODERM CQ) 14 MG/24HR patch, APPLY 1 PATCH AND CHANGE DAILY  DO NOT SMOKE WHILE WEARING PATCH, Disp: , Rfl: 0  •  Polyethylene Glycol 3350 (MIRALAX PO), Take 1 dose by mouth Daily., Disp: , Rfl:   •  raNITIdine (ZANTAC) 150 MG tablet, Take 150 mg by mouth 2 (Two) Times a Day., Disp: , Rfl:   •  rOPINIRole (REQUIP) 1 MG tablet, Take 1 mg by mouth Every Night. Take 1 hour before bedtime., Disp: , Rfl:   •  sertraline (ZOLOFT) 25 MG tablet, Take 25 mg by mouth Daily., Disp: , Rfl:   •  simvastatin (ZOCOR) 40 MG tablet, Take 40 mg by mouth Every Evening., Disp: , Rfl: 2  •  tiotropium bromide monohydrate (SPIRIVA RESPIMAT) 2.5 MCG/ACT aerosol solution inhaler, Inhale 2 puffs Daily., Disp: 1 inhaler, Rfl: 5  •  topiramate (TOPAMAX) 50 MG tablet, Take 50 mg by mouth 2 (Two) Times a Day., Disp: , Rfl:   •  vitamin B-12 (CYANOCOBALAMIN) 1000 MCG tablet, TAKE ONE TABLET BY MOUTH ONCE vdaily, Disp: , Rfl: 2  Allergies   Allergen Reactions   • Penicillins Anaphylaxis, Swelling and Rash     Social History     Socioeconomic History   • Marital status:      Spouse name: Not on file   • Number of children: 5   • Years of education: Not on file   • Highest education level: Not on file   Occupational History   •  "Occupation:  / [employer]     Comment: Retired    Tobacco Use   • Smoking status: Current Every Day Smoker     Packs/day: 1.00     Years: 57.00     Pack years: 57.00     Types: Cigarettes     Last attempt to quit: 2019     Years since quittin.9   • Smokeless tobacco: Never Used   Substance and Sexual Activity   • Alcohol use: No     Frequency: Never   • Drug use: No   • Sexual activity: Defer   Social History Narrative    Lives in [place].     Family History   Problem Relation Age of Onset   • Hypertension Mother    • Stroke Father      Review of Systems   Constitution: Negative. Negative for chills, fever, malaise/fatigue, night sweats and weight loss.   HENT: Negative.  Negative for hearing loss, odynophagia and sore throat.    Eyes: Negative.    Cardiovascular: Negative.  Negative for chest pain, dyspnea on exertion, leg swelling, orthopnea and palpitations.   Respiratory: Negative.  Negative for cough and hemoptysis.    Endocrine: Negative.  Negative for cold intolerance, heat intolerance, polydipsia, polyphagia and polyuria.   Hematologic/Lymphatic: Bruises/bleeds easily.   Skin: Negative.  Negative for itching and rash.   Musculoskeletal: Positive for arthritis and joint pain. Negative for joint swelling and myalgias.   Gastrointestinal: Negative.  Negative for abdominal pain, constipation, diarrhea, hematemesis, hematochezia, melena, nausea and vomiting.   Genitourinary: Negative.  Negative for dysuria, frequency and hematuria.   Neurological: Positive for loss of balance. Negative for focal weakness, headaches, numbness and seizures.   Psychiatric/Behavioral: Negative.  Negative for suicidal ideas.   Allergic/Immunologic: Negative.    All other systems reviewed and are negative.    Vitals:    20 0902   BP: 154/92   BP Location: Right arm   Patient Position: Sitting   Pulse: 62   Temp: 97.8 °F (36.6 °C)   SpO2: 97%   Weight: 82.1 kg (181 lb)   Height: 182.9 cm (72\")      Physical " Exam  Gen- NAD, pleasant, cooperative  CV- Regular rate and rhythm, no murmur, gallop or rub  Pulm- Clear to auscultation bilateral without wheeze or rhonchi  GI- Soft, normoactive bowel sounds, non-tender  Ext- Without edema  Neuro- CN II- XII grossly intact, tongue midline, voice normal.    Labs/Imaging:  CT Chest W/ Contrast, [facility], 7/28/20  Impression:  Postsurgical changes identified within the right lower lung field.  Volume loss is present with no evidence of local recurrence or metastatic disease.  Underlying chronic and emphysematous changes seen throughout the lung fields.  I personally reviewed these images in the office today.    Assessment/Plan:  Patient is a 75-year-old  male with history of right lung cancer status post right upper lobectomy and mediastinal lymph node dissection on 8/22/2019 revealing stage Ib adenocarcinoma who returns the office today for follow-up exam.  The patient was last seen in our office on 9/10/2019 and returns the office today to discuss the results of his recent CT scan.  At the time of his last visit, the patient was referred to Dr. Sánchez for evaluation of thyroid cancer and the patient has undergone partial thyroidectomy.  I discussed the results of the patient's imaging with him in the office today which showed no evidence of recurrence or metastatic disease.  At this time, I will plan to see the patient back in 6 months with repeat CT scan of the chest.  If at that time his scan remains clear, we will push this out to once per year.  Should he have any questions or concerns in the interval, he will contact the office.    Patient Active Problem List   Diagnosis   • Abnormal CT of the chest   • Lung nodule < 6cm on CT   • Mediastinal lymphadenopathy   • RLL adenocarcinoma; s/p RUL lobectomy/mediastinal lymph node dissection (Dr. Nance, 8/22/19)   • COPD   • Dyslipidemia   • Hypertension   • Restless leg syndrome   • Papillary thyroid  carcinoma    • Smoker   • Non-compliance w/ home O2     Please note, this document was produced using voice recognition software.        SHASHANK Quigley  [facility] Cardiothoracic Surgery

## 2020-09-18 ENCOUNTER — TRANSCRIBE ORDERS (OUTPATIENT)
Dept: PULMONOLOGY | Facility: CLINIC | Age: 75
End: 2020-09-18

## 2020-09-18 ENCOUNTER — LAB (OUTPATIENT)
Dept: LAB | Facility: HOSPITAL | Age: 75
End: 2020-09-18

## 2020-09-18 DIAGNOSIS — C34.90 MALIGNANT NEOPLASM OF BRONCHUS AND LUNG (HCC): ICD-10-CM

## 2020-09-18 DIAGNOSIS — C34.90 MALIGNANT NEOPLASM OF BRONCHUS AND LUNG (HCC): Primary | ICD-10-CM

## 2020-09-18 LAB
ALBUMIN SERPL-MCNC: 4.3 G/DL (ref 3.5–5.2)
ALBUMIN/GLOB SERPL: 1.4 G/DL
ALP SERPL-CCNC: 63 U/L (ref 39–117)
ALT SERPL W P-5'-P-CCNC: 11 U/L (ref 1–41)
ANION GAP SERPL CALCULATED.3IONS-SCNC: 8.1 MMOL/L (ref 5–15)
AST SERPL-CCNC: 15 U/L (ref 1–40)
BASOPHILS # BLD AUTO: 0.09 10*3/MM3 (ref 0–0.2)
BASOPHILS NFR BLD AUTO: 1 % (ref 0–1.5)
BILIRUB SERPL-MCNC: 0.3 MG/DL (ref 0–1.2)
BUN SERPL-MCNC: 13 MG/DL (ref 8–23)
BUN/CREAT SERPL: 11 (ref 7–25)
CALCIUM SPEC-SCNC: 9.4 MG/DL (ref 8.6–10.5)
CHLORIDE SERPL-SCNC: 110 MMOL/L (ref 98–107)
CO2 SERPL-SCNC: 23.9 MMOL/L (ref 22–29)
CREAT SERPL-MCNC: 1.18 MG/DL (ref 0.76–1.27)
DEPRECATED RDW RBC AUTO: 45.2 FL (ref 37–54)
EOSINOPHIL # BLD AUTO: 0.19 10*3/MM3 (ref 0–0.4)
EOSINOPHIL NFR BLD AUTO: 2.2 % (ref 0.3–6.2)
ERYTHROCYTE [DISTWIDTH] IN BLOOD BY AUTOMATED COUNT: 13.9 % (ref 12.3–15.4)
FERRITIN SERPL-MCNC: 79.7 NG/ML (ref 30–400)
GFR SERPL CREATININE-BSD FRML MDRD: 60 ML/MIN/1.73
GLOBULIN UR ELPH-MCNC: 3 GM/DL
GLUCOSE SERPL-MCNC: 85 MG/DL (ref 65–99)
HCT VFR BLD AUTO: 41.1 % (ref 37.5–51)
HGB BLD-MCNC: 14 G/DL (ref 13–17.7)
IMM GRANULOCYTES # BLD AUTO: 0.04 10*3/MM3 (ref 0–0.05)
IMM GRANULOCYTES NFR BLD AUTO: 0.5 % (ref 0–0.5)
IRON 24H UR-MRATE: 108 MCG/DL (ref 59–158)
IRON SATN MFR SERPL: 28 % (ref 20–50)
LYMPHOCYTES # BLD AUTO: 2.97 10*3/MM3 (ref 0.7–3.1)
LYMPHOCYTES NFR BLD AUTO: 33.9 % (ref 19.6–45.3)
MCH RBC QN AUTO: 30.4 PG (ref 26.6–33)
MCHC RBC AUTO-ENTMCNC: 34.1 G/DL (ref 31.5–35.7)
MCV RBC AUTO: 89.3 FL (ref 79–97)
MONOCYTES # BLD AUTO: 0.71 10*3/MM3 (ref 0.1–0.9)
MONOCYTES NFR BLD AUTO: 8.1 % (ref 5–12)
NEUTROPHILS NFR BLD AUTO: 4.75 10*3/MM3 (ref 1.7–7)
NEUTROPHILS NFR BLD AUTO: 54.3 % (ref 42.7–76)
NRBC BLD AUTO-RTO: 0 /100 WBC (ref 0–0.2)
PLATELET # BLD AUTO: 218 10*3/MM3 (ref 140–450)
PMV BLD AUTO: 10.6 FL (ref 6–12)
POTASSIUM SERPL-SCNC: 4.6 MMOL/L (ref 3.5–5.2)
PROT SERPL-MCNC: 7.3 G/DL (ref 6–8.5)
RBC # BLD AUTO: 4.6 10*6/MM3 (ref 4.14–5.8)
SODIUM SERPL-SCNC: 142 MMOL/L (ref 136–145)
T4 SERPL-MCNC: 5.81 MCG/DL (ref 4.5–11.7)
TIBC SERPL-MCNC: 381 MCG/DL (ref 298–536)
TRANSFERRIN SERPL-MCNC: 256 MG/DL (ref 200–360)
TSH SERPL DL<=0.05 MIU/L-ACNC: 4.3 UIU/ML (ref 0.27–4.2)
WBC # BLD AUTO: 8.75 10*3/MM3 (ref 3.4–10.8)

## 2020-09-18 PROCEDURE — 84466 ASSAY OF TRANSFERRIN: CPT

## 2020-09-18 PROCEDURE — 80053 COMPREHEN METABOLIC PANEL: CPT

## 2020-09-18 PROCEDURE — 84436 ASSAY OF TOTAL THYROXINE: CPT

## 2020-09-18 PROCEDURE — 83540 ASSAY OF IRON: CPT

## 2020-09-18 PROCEDURE — 36415 COLL VENOUS BLD VENIPUNCTURE: CPT

## 2020-09-18 PROCEDURE — 85025 COMPLETE CBC W/AUTO DIFF WBC: CPT

## 2020-09-18 PROCEDURE — 84443 ASSAY THYROID STIM HORMONE: CPT

## 2020-09-18 PROCEDURE — 82728 ASSAY OF FERRITIN: CPT

## 2020-10-08 RX ORDER — LEVALBUTEROL TARTRATE 45 MCG
HFA AEROSOL WITH ADAPTER (GRAM) INHALATION
Qty: 15 G | Refills: 0 | OUTPATIENT
Start: 2020-10-08

## 2021-01-12 ENCOUNTER — TELEPHONE (OUTPATIENT)
Dept: CARDIAC SURGERY | Facility: CLINIC | Age: 76
End: 2021-01-12

## 2021-01-12 NOTE — TELEPHONE ENCOUNTER
Patient has not received his recall letter yet, but is ready to go ahead and schedule the CT chest and follow-up appointment.    Abdirahman

## 2021-01-15 DIAGNOSIS — IMO0001 LUNG NODULE < 6CM ON CT: Primary | ICD-10-CM

## 2021-02-01 ENCOUNTER — HOSPITAL ENCOUNTER (OUTPATIENT)
Dept: CT IMAGING | Facility: HOSPITAL | Age: 76
End: 2021-02-01

## 2021-02-08 ENCOUNTER — HOSPITAL ENCOUNTER (OUTPATIENT)
Dept: CT IMAGING | Facility: HOSPITAL | Age: 76
Discharge: HOME | End: 2021-02-08
Admitting: PHYSICIAN ASSISTANT

## 2021-02-08 DIAGNOSIS — IMO0001 LUNG NODULE < 6CM ON CT: ICD-10-CM

## 2021-02-08 LAB — CREAT BLDA-MCNC: 1.2 MG/DL (ref 0.6–1.3)

## 2021-02-08 PROCEDURE — 82565 ASSAY OF CREATININE: CPT

## 2021-02-08 PROCEDURE — 71270 CT THORAX DX C-/C+: CPT

## 2021-02-08 PROCEDURE — 25010000002 IOPAMIDOL 61 % SOLUTION: Performed by: PHYSICIAN ASSISTANT

## 2021-02-08 RX ADMIN — IOPAMIDOL 95 ML: 612 INJECTION, SOLUTION INTRAVENOUS at 10:42

## 2021-03-31 ENCOUNTER — OFFICE VISIT (OUTPATIENT)
Dept: CARDIAC SURGERY | Facility: CLINIC | Age: 76
End: 2021-03-31

## 2021-03-31 VITALS
TEMPERATURE: 97.7 F | SYSTOLIC BLOOD PRESSURE: 132 MMHG | DIASTOLIC BLOOD PRESSURE: 83 MMHG | BODY MASS INDEX: 26.18 KG/M2 | WEIGHT: 187 LBS | HEIGHT: 71 IN | OXYGEN SATURATION: 98 % | HEART RATE: 63 BPM

## 2021-03-31 DIAGNOSIS — I71.40 AAA (ABDOMINAL AORTIC ANEURYSM) WITHOUT RUPTURE (HCC): Primary | ICD-10-CM

## 2021-03-31 DIAGNOSIS — C34.31 MALIGNANT NEOPLASM OF LOWER LOBE OF RIGHT LUNG (HCC): Primary | ICD-10-CM

## 2021-03-31 DIAGNOSIS — I71.40 AAA (ABDOMINAL AORTIC ANEURYSM) WITHOUT RUPTURE (HCC): ICD-10-CM

## 2021-03-31 PROCEDURE — 99214 OFFICE O/P EST MOD 30 MIN: CPT | Performed by: NURSE PRACTITIONER

## 2021-03-31 RX ORDER — LEVOTHYROXINE SODIUM 0.05 MG/1
75 TABLET ORAL DAILY
COMMUNITY

## 2021-03-31 RX ORDER — OMEPRAZOLE 20 MG/1
20 CAPSULE, DELAYED RELEASE ORAL DAILY
COMMUNITY

## 2021-03-31 NOTE — PROGRESS NOTES
[facility] Cardiothoracic Surgery Office Follow Up Note     Date of Encounter: 03/31/2021     MRN Number: [id number]  Name: Angi Marinelli  Phone Number: [phone]     Referred By: No ref. provider found  PCP: Georgiana Cortés DO    Chief Complaint:    Chief Complaint   Patient presents with   • Follow-up     6 month follow up for lung cancer to discuss CT chest results.   • Lung Cancer       History of Present Illness:    Angi Marinelli is a 75 y.o. male with a history of hypertension, hyperlipidemia, COPD, ongoing tobacco use, papillary thyroid carcinoma s/p partial thyroidectomy, AAA s/p endograft repair 2008 with Dr. 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 Nance.  He presents today in 6-month follow-up of his lung cancer with repeat CT chest.  Last seen the office on 8/4/2020.  Since last office visit, he has been doing well.  He denies any cough, hemoptysis, shortness of breath or unexplained weight loss.  He follows closely with Dr. Beard with Dr. Liddle.  Pt with history of AAA s/p endograft repair with Dr. Cheung and there has not been any recent imaging over the last few years.  He denies any abd pain/back or flank pain.  Pt continues to smoke.      Review of Systems:  Review of Systems   Constitutional: Positive for weight gain. Negative for chills, decreased appetite, diaphoresis, fever, malaise/fatigue, night sweats and weight loss.   HENT: Negative.  Negative for congestion, hoarse voice, sore throat and stridor.    Cardiovascular: Negative for chest pain, claudication, dyspnea on exertion, irregular heartbeat, leg swelling, near-syncope, orthopnea, palpitations, paroxysmal nocturnal dyspnea and syncope.   Respiratory: Negative.  Negative for cough, hemoptysis, shortness of breath, sleep disturbances due to breathing, snoring, sputum production and wheezing.    Hematologic/Lymphatic:  Negative for adenopathy and bleeding problem. Bruises/bleeds easily.   Skin: Negative.  Negative for color change, dry skin, itching, poor wound healing and rash.   Musculoskeletal: Negative for arthritis, back pain, falls and muscle weakness.   Gastrointestinal: Negative.  Negative for abdominal pain, anorexia, constipation, diarrhea, hematochezia, melena, nausea and vomiting.   Neurological: Negative for difficulty with concentration, disturbances in coordination, dizziness, loss of balance, numbness, seizures, vertigo and weakness.   Psychiatric/Behavioral: Negative for altered mental status, depression, memory loss and substance abuse. The patient does not have insomnia and is not nervous/anxious.    Allergic/Immunologic: Negative for persistent infections.       I have reviewed the review of systems as entered by my clinical support staff and have updated it as appropriate.       Allergies:  Allergies   Allergen Reactions   • Penicillins Anaphylaxis, Swelling and Rash       Medications:      Current Outpatient Medications:   •  amLODIPine (NORVASC) 10 MG tablet, Take 10 mg by mouth Daily., Disp: , Rfl:   •  aspirin 325 MG tablet, Take 325 mg by mouth Daily., Disp: , Rfl:   •  clonazePAM (KLONOPIN) 0.5 MG tablet, Take 1 tablet by mouth Every Night., Disp: 30 tablet, Rfl: 3  •  docusate sodium (COLACE) 100 MG capsule, Take 100 mg by mouth At Night As Needed for Constipation., Disp: , Rfl:   •  enalapril (VASOTEC) 20 MG tablet, Take 20 mg by mouth Every Night., Disp: , Rfl:   •  finasteride (PROSCAR) 5 MG tablet, Take 5 mg by mouth Daily., Disp: , Rfl:   •  HYDROcodone-acetaminophen (NORCO) 5-325 MG per tablet, Take 1 tablet by mouth 2 (Two) Times a Day., Disp: 30 tablet, Rfl: 0  •  ipratropium-albuterol (DUO-NEB) 0.5-2.5 mg/3 ml nebulizer, 3 mL Every 6 (Six) Hours As Needed for Wheezing or Shortness of Air., Disp: , Rfl:   •  levalbuterol (XOPENEX HFA) 45 MCG/ACT inhaler, Inhale 2 puffs Every 6 (Six) Hours As  Needed for Wheezing or Shortness of Air., Disp: 1 inhaler, Rfl: 5  •  levothyroxine (SYNTHROID, LEVOTHROID) 50 MCG tablet, Take 50 mcg by mouth Daily., Disp: , Rfl:   •  montelukast (SINGULAIR) 10 MG tablet, Take 10 mg by mouth Every Night., Disp: , Rfl:   •  omeprazole (priLOSEC) 20 MG capsule, Take 20 mg by mouth Daily., Disp: , Rfl:   •  Polyethylene Glycol 3350 (MIRALAX PO), Take 1 dose by mouth Daily., Disp: , Rfl:   •  rOPINIRole (REQUIP) 1 MG tablet, Take 1 mg by mouth Every Night. Take 1 hour before bedtime., Disp: , Rfl:   •  sertraline (ZOLOFT) 25 MG tablet, Take 25 mg by mouth Daily., Disp: , Rfl:   •  simvastatin (ZOCOR) 40 MG tablet, Take 40 mg by mouth Every Evening., Disp: , Rfl: 2  •  tiotropium bromide monohydrate (SPIRIVA RESPIMAT) 2.5 MCG/ACT aerosol solution inhaler, Inhale 2 puffs Daily., Disp: 1 inhaler, Rfl: 5  •  topiramate (TOPAMAX) 50 MG tablet, Take 50 mg by mouth 2 (Two) Times a Day., Disp: , Rfl:     Social History     Socioeconomic History   • Marital status:      Spouse name: Not on file   • Number of children: 5   • Years of education: Not on file   • Highest education level: Not on file   Tobacco Use   • Smoking status: Current Every Day Smoker     Packs/day: 1.00     Years: 57.00     Pack years: 57.00     Types: Cigarettes     Last attempt to quit: 2019     Years since quittin.6   • Smokeless tobacco: Never Used   Substance and Sexual Activity   • Alcohol use: No   • Drug use: No   • Sexual activity: Defer       Family History   Problem Relation Age of Onset   • Hypertension Mother    • Stroke Father        Past Medical History:   Diagnosis Date   • Abnormal CT lung screening    • Arthritis     hip    • COPD (chronic obstructive pulmonary disease) (CMS/HCC)    • Full dentures    • GERD (gastroesophageal reflux disease)    • Heat stroke     x 4   • Hyperlipidemia    • Hypertension    • Lung cancer (CMS/HCC)    • Migraine    • On home oxygen therapy     given   "but has not used   • Pneumonia 2019   • PONV (postoperative nausea and vomiting)    • Restless leg syndrome    • Skin cancer     right side of face removed    • Thyroid cancer (CMS/HCC)    • Tobacco abuse    • Wears reading eyeglasses        Past Surgical History:   Procedure Laterality Date   • ARTERIAL ANEURYSM REPAIR  2008    AAA Repair by Dr. Omi Cheung   • BRONCHOSCOPY N/A 6/27/2019    Procedure: BRONCHOSCOPY WITH ENDOBRONCHIAL ULTRASOUND;  Surgeon: Iggy Beard MD;  Location:  [facility];  Service: Pulmonary   • BRONCHOSCOPY N/A 8/22/2019    Procedure: BRONCHOSCOPY;  Surgeon: Shabbir Nance MD;  Location:  [facility];  Service: Cardiothoracic   • CATARACT EXTRACTION Bilateral     Cataract extraction with IOL   • LYMPH NODE DISSECTION Right 8/22/2019    Procedure: MEDIASTINAL LYMPH NODE DISSECTION RIGHT;  Surgeon: Shabbir Nance MD;  Location:  [facility];  Service: Cardiothoracic   • SKIN BIOPSY      right face   • THORACOSCOPY Right 8/22/2019    Procedure: THORACOSCOPY VIDEO ASSISTED WITH RIGHT UPPER LOBECTOMY, INTERCOSTAL NERVE BLOCKS WITH CRYOABLATION;  Surgeon: Shabbir Nance MD;  Location:  [facility];  Service: Cardiothoracic   • THYROIDECTOMY, PARTIAL Right 11/13/2019    lymph nodes also       Physical Exam:  Vital Signs:    Vitals:    03/31/21 0843   BP: 132/83   BP Location: Right arm   Patient Position: Sitting   Pulse: 63   Temp: 97.7 °F (36.5 °C)   SpO2: 98%   Weight: 84.8 kg (187 lb)   Height: 180.3 cm (71\")     Body mass index is 26.08 kg/m².     Physical Exam  Vitals and nursing note reviewed.   Constitutional:       Appearance: Normal appearance. He is well-developed and well-groomed.   HENT:      Head: Normocephalic and atraumatic.   Cardiovascular:      Rate and Rhythm: Normal rate and regular rhythm.      Heart sounds: Normal heart sounds, S1 normal and S2 normal. No murmur heard.   No friction rub.   Pulmonary:      Comments: Unlabored, Coarse to auscultation bilaterally  Abdominal:    "   General: Bowel sounds are normal.      Palpations: Abdomen is soft.      Tenderness: There is no abdominal tenderness.   Musculoskeletal:      Cervical back: Neck supple.      Right lower leg: No edema.      Left lower leg: No edema.   Skin:     General: Skin is warm and dry.   Neurological:      Mental Status: He is alert and oriented to person, place, and time.   Psychiatric:         Attention and Perception: Attention normal.         Mood and Affect: Mood normal.         Speech: Speech normal.         Behavior: Behavior is cooperative.         Labs/Imaging:    CT Chest With & Without Contrast Diagnostic    Result Date: 2/8/2021  Stable appearance of the chest with background emphysematous changes and postsurgical changes of the right lung. No new or enlarging suspicious pulmonary nodules.   This report was finalized on 2/8/2021 12:10 PM by Huey León.    Personally reviewed    Assessment / Plan:  Diagnoses and all orders for this visit:    1. RLL adenocarcinoma; s/p RUL lobectomy/mediastinal lymph node dissection (Dr. Nance, 8/22/19) (Primary)    2. AAA (abdominal aortic aneurysm) without rupture (CMS/HCC)     Angi Marinelli is a 75 y.o. male with a history of hypertension, hyperlipidemia, COPD, ongoing tobacco use, papillary thyroid carcinoma s/p partial thyroidectomy, AAA s/p endograft repair 2008 with Dr. 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 Nance.  Discussed findings of stable CT chest with no recurrence.  He continues to smoke and is not interested in cessation.  Patient follows with Dr. Liddle and Dr. Beard.  Patient has a history of a EVAR in 2008 and has not had any recent imaging.  We will set patient up for CT abdomen/pelvis and follow-up with telephone visit with his daughter Debbie at [phone] who is his POA.  If results are stable, we will follow-up with patient in 6 months with repeat  CT chest earlier as needed.    Cassidy Cash, [facility] Cardiothoracic Surgery    Please note that portions of this note may have been completed with a voice recognition program. Efforts were made to edit the dictations, but occasionally words are mistranscribed.

## 2021-04-14 ENCOUNTER — HOSPITAL ENCOUNTER (OUTPATIENT)
Dept: CT IMAGING | Facility: HOSPITAL | Age: 76
Discharge: HOME | End: 2021-04-14
Admitting: NURSE PRACTITIONER

## 2021-04-14 DIAGNOSIS — I71.40 AAA (ABDOMINAL AORTIC ANEURYSM) WITHOUT RUPTURE (HCC): ICD-10-CM

## 2021-04-14 PROCEDURE — 25010000002 IOPAMIDOL 61 % SOLUTION: Performed by: NURSE PRACTITIONER

## 2021-04-14 PROCEDURE — 74178 CT ABD&PLV WO CNTR FLWD CNTR: CPT

## 2021-04-14 RX ADMIN — IOPAMIDOL 100 ML: 612 INJECTION, SOLUTION INTRAVENOUS at 12:50

## 2021-05-26 ENCOUNTER — OFFICE VISIT (OUTPATIENT)
Dept: CARDIAC SURGERY | Facility: CLINIC | Age: 76
End: 2021-05-26

## 2021-05-26 DIAGNOSIS — I71.40 AAA (ABDOMINAL AORTIC ANEURYSM) WITHOUT RUPTURE (HCC): Primary | ICD-10-CM

## 2021-05-26 PROCEDURE — 99442 PR PHYS/QHP TELEPHONE EVALUATION 11-20 MIN: CPT | Performed by: NURSE PRACTITIONER

## 2021-05-26 NOTE — PROGRESS NOTES
You have chosen to receive care through a telephone visit. Do you consent to use a telephone visit for your medical care today? Yes         [facility] Cardiothoracic Surgery Office Telephone visit    Date of Encounter: 05/26/2021     MRN Number: [id number]  Name: Angi Marinelli  Phone Number: [phone]     Referred By: No ref. provider found  PCP: Georgiana Cortés DO    Chief Complaint:    Chief Complaint   Patient presents with   • Aortic Aneurysm     Follow up with CT abd/pel for AAA        History of Present Illness:    Angi Marinelli is a 76 y.o. male with a history of hypertension, hyperlipidemia, COPD, ongoing tobacco use, papillary thyroid carcinoma s/p partial thyroidectomy, AAA s/p endograft repair 2008 with Dr. 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 Nance.    Presents today for follow-up of his EVAR with CT abdomen/pelvis.  Last seen in the office on 3/31/2021 for discussion of CT chest with history of lung cancer.  Since last office visit, patient has been doing well.  He denies any unusual abdominal pain, back pain or flank pain.  Blood pressures have remained stable.  Patient continues to smoke.    Review of Systems:  Review of Systems   Constitutional: Negative for chills, decreased appetite, diaphoresis, fever, malaise/fatigue, night sweats and weight loss.   HENT: Negative for congestion, hoarse voice, sore throat and stridor.    Cardiovascular: Negative for chest pain, claudication, dyspnea on exertion, irregular heartbeat, leg swelling, near-syncope, orthopnea, palpitations, paroxysmal nocturnal dyspnea and syncope.   Respiratory: Negative for cough, hemoptysis, shortness of breath, sleep disturbances due to breathing, snoring, sputum production and wheezing.    Hematologic/Lymphatic: Negative for adenopathy and bleeding problem. Does not bruise/bleed easily.   Skin: Negative for  color change, dry skin, itching, poor wound healing and rash.   Musculoskeletal: Negative for arthritis, back pain, falls and muscle weakness.   Gastrointestinal: Negative for abdominal pain, anorexia, constipation, diarrhea, hematochezia, melena, nausea and vomiting.   Neurological: Negative for difficulty with concentration, disturbances in coordination, dizziness, loss of balance, numbness, seizures, vertigo and weakness.   Psychiatric/Behavioral: Negative for altered mental status, depression, memory loss and substance abuse. The patient does not have insomnia and is not nervous/anxious.    Allergic/Immunologic: Negative for persistent infections.     I have reviewed the review of systems as entered by my clinical support staff and have updated it as appropriate.       Allergies:  Allergies   Allergen Reactions   • Penicillins Anaphylaxis, Swelling and Rash       Medications:      Current Outpatient Medications:   •  amLODIPine (NORVASC) 10 MG tablet, Take 10 mg by mouth Daily., Disp: , Rfl:   •  aspirin 325 MG tablet, Take 325 mg by mouth Daily., Disp: , Rfl:   •  clonazePAM (KLONOPIN) 0.5 MG tablet, Take 1 tablet by mouth Every Night., Disp: 30 tablet, Rfl: 3  •  docusate sodium (COLACE) 100 MG capsule, Take 100 mg by mouth At Night As Needed for Constipation., Disp: , Rfl:   •  enalapril (VASOTEC) 20 MG tablet, Take 20 mg by mouth Every Night., Disp: , Rfl:   •  finasteride (PROSCAR) 5 MG tablet, Take 5 mg by mouth Daily., Disp: , Rfl:   •  HYDROcodone-acetaminophen (NORCO) 5-325 MG per tablet, Take 1 tablet by mouth 2 (Two) Times a Day., Disp: 30 tablet, Rfl: 0  •  ipratropium-albuterol (DUO-NEB) 0.5-2.5 mg/3 ml nebulizer, 3 mL Every 6 (Six) Hours As Needed for Wheezing or Shortness of Air., Disp: , Rfl:   •  levalbuterol (XOPENEX HFA) 45 MCG/ACT inhaler, Inhale 2 puffs Every 6 (Six) Hours As Needed for Wheezing or Shortness of Air., Disp: 1 inhaler, Rfl: 5  •  levothyroxine (SYNTHROID, LEVOTHROID) 50 MCG  tablet, Take 50 mcg by mouth Daily., Disp: , Rfl:   •  montelukast (SINGULAIR) 10 MG tablet, Take 10 mg by mouth Every Night., Disp: , Rfl:   •  omeprazole (priLOSEC) 20 MG capsule, Take 20 mg by mouth Daily., Disp: , Rfl:   •  Polyethylene Glycol 3350 (MIRALAX PO), Take 1 dose by mouth Daily., Disp: , Rfl:   •  rOPINIRole (REQUIP) 1 MG tablet, Take 1 mg by mouth Every Night. Take 1 hour before bedtime., Disp: , Rfl:   •  sertraline (ZOLOFT) 25 MG tablet, Take 25 mg by mouth Daily., Disp: , Rfl:   •  simvastatin (ZOCOR) 40 MG tablet, Take 40 mg by mouth Every Evening., Disp: , Rfl: 2  •  tiotropium bromide monohydrate (SPIRIVA RESPIMAT) 2.5 MCG/ACT aerosol solution inhaler, Inhale 2 puffs Daily., Disp: 1 inhaler, Rfl: 5  •  topiramate (TOPAMAX) 50 MG tablet, Take 50 mg by mouth 2 (Two) Times a Day., Disp: , Rfl:     Social History     Socioeconomic History   • Marital status:      Spouse name: Not on file   • Number of children: 5   • Years of education: Not on file   • Highest education level: Not on file   Tobacco Use   • Smoking status: Current Every Day Smoker     Packs/day: 1.00     Years: 57.00     Pack years: 57.00     Types: Cigarettes     Last attempt to quit: 2019     Years since quittin.7   • Smokeless tobacco: Never Used   Substance and Sexual Activity   • Alcohol use: No   • Drug use: No   • Sexual activity: Defer       Family History   Problem Relation Age of Onset   • Hypertension Mother    • Stroke Father        Past Medical History:   Diagnosis Date   • Abnormal CT lung screening    • Arthritis     hip    • COPD (chronic obstructive pulmonary disease) (CMS/HCC)    • Full dentures    • GERD (gastroesophageal reflux disease)    • Heat stroke     x 4   • Hyperlipidemia    • Hypertension    • Lung cancer (CMS/HCC)    • Migraine    • On home oxygen therapy     given  but has not used   • Pneumonia    • PONV (postoperative nausea and vomiting)    • Restless leg syndrome    • Skin  cancer     right side of face removed    • Thyroid cancer (CMS/HCC)    • Tobacco abuse    • Wears reading eyeglasses        Past Surgical History:   Procedure Laterality Date   • ARTERIAL ANEURYSM REPAIR  2008    AAA Repair by Dr. Omi Cheung   • BRONCHOSCOPY N/A 6/27/2019    Procedure: BRONCHOSCOPY WITH ENDOBRONCHIAL ULTRASOUND;  Surgeon: Iggy Beard MD;  Location:  [facility];  Service: Pulmonary   • BRONCHOSCOPY N/A 8/22/2019    Procedure: BRONCHOSCOPY;  Surgeon: Shabbir Nance MD;  Location:  [facility];  Service: Cardiothoracic   • CATARACT EXTRACTION Bilateral     Cataract extraction with IOL   • LYMPH NODE DISSECTION Right 8/22/2019    Procedure: MEDIASTINAL LYMPH NODE DISSECTION RIGHT;  Surgeon: Shabbir Nance MD;  Location:  [facility];  Service: Cardiothoracic   • SKIN BIOPSY      right face   • THORACOSCOPY Right 8/22/2019    Procedure: THORACOSCOPY VIDEO ASSISTED WITH RIGHT UPPER LOBECTOMY, INTERCOSTAL NERVE BLOCKS WITH CRYOABLATION;  Surgeon: Shabbir Nance MD;  Location:  [facility];  Service: Cardiothoracic   • THYROIDECTOMY, PARTIAL Right 11/13/2019    lymph nodes also         Physical Exam:  Vital Signs:  There were no vitals filed for this visit.  There is no height or weight on file to calculate BMI.     Physical Exam  Pulmonary:      Effort: Pulmonary effort is normal.   Neurological:      Mental Status: He is alert.   Psychiatric:         Mood and Affect: Mood normal.         Labs/Imaging:    CT Abdomen Pelvis With & Without Contrast    Result Date: 4/14/2021  No acute intra-abdominal or pelvic process.  1128.19 mGy.cm   This study was performed with techniques to keep radiation doses as low as reasonably achievable (ALARA). Individualized dose reduction techniques using automated exposure control or adjustment of mA and/or kV according to the patient size were employed.  This report was finalized on 4/14/2021 1:02 PM by Shanel Hazel M.D..    Personally reviewed    Assessment /  Plan:  Diagnoses and all orders for this visit:    1. AAA (abdominal aortic aneurysm) without rupture (CMS/HCC) (Primary)     Angi Marinelli is a 76 y.o. male with a history of hypertension, hyperlipidemia, COPD, ongoing tobacco use, papillary thyroid carcinoma s/p partial thyroidectomy, AAA s/p endograft repair 2008 with Dr. 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 Nance.    Discussed findings of stable CT abdomen/pelvis with 3.2 cm negative AAA with patent endograft and no endoleak.  Blood pressures have been stable.  He denies any unusual abdominal pain or back pain.  We will plan to follow-up at scheduled visit in September for his history of lung cancer with repeat CT chest, if not already performed by Dr. Beard or Dr. Bruno.  Plans for aortic US in 1 year with follow up for continued monitoring of EVAR.    This visit has been rescheduled as a phone visit to comply with patient safety concerns in accordance with CDC recommendations. Total time of discussion was 11 minutes.    Cassidy Cash APRELIZABETH  [facility] Cardiothoracic Surgery

## 2021-08-09 DIAGNOSIS — C34.31 MALIGNANT NEOPLASM OF LOWER LOBE OF RIGHT LUNG (HCC): Primary | ICD-10-CM

## 2021-09-14 ENCOUNTER — APPOINTMENT (OUTPATIENT)
Dept: CT IMAGING | Facility: HOSPITAL | Age: 76
End: 2021-09-14

## 2021-10-19 ENCOUNTER — HOSPITAL ENCOUNTER (OUTPATIENT)
Dept: CT IMAGING | Facility: HOSPITAL | Age: 76
Discharge: HOME | End: 2021-10-19
Admitting: NURSE PRACTITIONER

## 2021-10-19 DIAGNOSIS — C34.31 MALIGNANT NEOPLASM OF LOWER LOBE OF RIGHT LUNG (HCC): ICD-10-CM

## 2021-10-19 PROCEDURE — 71270 CT THORAX DX C-/C+: CPT

## 2021-10-19 PROCEDURE — 25010000002 IOPAMIDOL 61 % SOLUTION: Performed by: NURSE PRACTITIONER

## 2021-10-19 RX ADMIN — IOPAMIDOL 100 ML: 612 INJECTION, SOLUTION INTRAVENOUS at 08:32

## 2021-11-04 ENCOUNTER — OFFICE VISIT (OUTPATIENT)
Dept: CARDIAC SURGERY | Facility: CLINIC | Age: 76
End: 2021-11-04

## 2021-11-04 DIAGNOSIS — C34.31 MALIGNANT NEOPLASM OF LOWER LOBE OF RIGHT LUNG (HCC): Primary | ICD-10-CM

## 2021-11-04 PROCEDURE — 99442 PR PHYS/QHP TELEPHONE EVALUATION 11-20 MIN: CPT | Performed by: NURSE PRACTITIONER

## 2021-11-04 NOTE — PROGRESS NOTES
[facility] Cardiothoracic Surgery Telephone visit    You have chosen to receive care through a telephone visit. Do you consent to use a telephone visit for your medical care today? Yes     Date of Encounter: 11/04/2021     MRN Number:  [id number]  Name:  Angi Marinelli  Phone Number: [phone]     Referred By: No ref. provider found  PCP:  Georgiana Cortés DO    Chief Complaint:    Chief Complaint   Patient presents with   • Follow-up     6 month follow up w/ Ct Chest. Pt states that he is only SOB w/ great exertion. comments he is doing very . Denies any fatigue or pain st       History of Present Illness:    Angi Marinelli is a 76 y.o. male with a history of hypertension, hyperlipidemia, COPD, ongoing tobacco use, papillary thyroid carcinoma s/p partial thyroidectomy, AAA s/p endograft repair 2008 with Dr. 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 Nance.   Patient presents today for 6-month follow-up with CT chest with history of lung cancer.  Last in the office on 5/26/2021 for follow-up of his EVAR with CT abdomen pelvis with patent endograft and no endoleak.  Since last office visit, patient has been doing well.  He denies any cough, hemoptysis or unexplained weight loss.  He continues to smoke.    Review of Systems:  Review of Systems   Constitutional: Negative for chills, decreased appetite, diaphoresis, fever, malaise/fatigue, night sweats and weight loss.   HENT: Negative for congestion, hoarse voice, sore throat and stridor.    Cardiovascular: Positive for dyspnea on exertion. Negative for chest pain, claudication, irregular heartbeat, leg swelling, near-syncope, orthopnea, palpitations, paroxysmal nocturnal dyspnea and syncope.   Respiratory: Negative for cough, hemoptysis, shortness of breath, sleep disturbances due to breathing, snoring, sputum production and wheezing.     Hematologic/Lymphatic: Negative for adenopathy and bleeding problem. Bruises/bleeds easily.   Skin: Negative for color change, dry skin, itching, poor wound healing and rash.   Musculoskeletal: Negative for arthritis, back pain, falls and muscle weakness.   Gastrointestinal: Negative for abdominal pain, anorexia, constipation, diarrhea, hematochezia, melena, nausea and vomiting.   Neurological: Negative for difficulty with concentration, disturbances in coordination, dizziness, loss of balance, numbness, seizures, vertigo and weakness.   Psychiatric/Behavioral: Positive for depression. Negative for altered mental status, memory loss and substance abuse. The patient has insomnia. The patient is not nervous/anxious.    Allergic/Immunologic: Negative for persistent infections.       Past Medical History:    Past Medical History:   Diagnosis Date   • Abnormal CT lung screening    • Arthritis     hip    • COPD (chronic obstructive pulmonary disease) (HCC)    • Full dentures    • GERD (gastroesophageal reflux disease)    • Heat stroke     x 4   • Hyperlipidemia    • Hypertension    • Lung cancer (HCC)    • Migraine    • On home oxygen therapy     given 6-2019 but has not used   • Pneumonia 2019   • PONV (postoperative nausea and vomiting)    • Restless leg syndrome    • Skin cancer     right side of face removed    • Thyroid cancer (HCC)    • Tobacco abuse    • Wears reading eyeglasses        Past Surgical History:    Past Surgical History:   Procedure Laterality Date   • ARTERIAL ANEURYSM REPAIR  2008    AAA Repair by Dr. Omi Cheung   • BRONCHOSCOPY N/A 6/27/2019    Procedure: BRONCHOSCOPY WITH ENDOBRONCHIAL ULTRASOUND;  Surgeon: Iggy Beard MD;  Location: [facility] OR;  Service: Pulmonary   • BRONCHOSCOPY N/A 8/22/2019    Procedure: BRONCHOSCOPY;  Surgeon: Shabbir Nance MD;  Location: [facility];  Service: Cardiothoracic   • CATARACT EXTRACTION Bilateral     Cataract extraction with IOL   • LYMPH NODE  DISSECTION Right 8/22/2019    Procedure: MEDIASTINAL LYMPH NODE DISSECTION RIGHT;  Surgeon: Shabbir Nance MD;  Location:  [facility];  Service: Cardiothoracic   • SKIN BIOPSY      right face   • THORACOSCOPY Right 8/22/2019    Procedure: THORACOSCOPY VIDEO ASSISTED WITH RIGHT UPPER LOBECTOMY, INTERCOSTAL NERVE BLOCKS WITH CRYOABLATION;  Surgeon: Shabbir Nance MD;  Location:  [facility];  Service: Cardiothoracic   • THYROIDECTOMY, PARTIAL Right 11/13/2019    lymph nodes also       Allergies:  Allergies   Allergen Reactions   • Penicillins Anaphylaxis, Swelling and Rash       Medications:      Current Outpatient Medications:   •  amLODIPine (NORVASC) 10 MG tablet, Take 10 mg by mouth Daily., Disp: , Rfl:   •  aspirin 325 MG tablet, Take 325 mg by mouth Daily., Disp: , Rfl:   •  clonazePAM (KLONOPIN) 0.5 MG tablet, Take 1 tablet by mouth Every Night., Disp: 30 tablet, Rfl: 3  •  docusate sodium (COLACE) 100 MG capsule, Take 100 mg by mouth At Night As Needed for Constipation., Disp: , Rfl:   •  enalapril (VASOTEC) 20 MG tablet, Take 20 mg by mouth Every Night., Disp: , Rfl:   •  finasteride (PROSCAR) 5 MG tablet, Take 5 mg by mouth Daily., Disp: , Rfl:   •  HYDROcodone-acetaminophen (NORCO) 5-325 MG per tablet, Take 1 tablet by mouth 2 (Two) Times a Day., Disp: 30 tablet, Rfl: 0  •  ipratropium-albuterol (DUO-NEB) 0.5-2.5 mg/3 ml nebulizer, 3 mL Every 6 (Six) Hours As Needed for Wheezing or Shortness of Air., Disp: , Rfl:   •  levalbuterol (XOPENEX HFA) 45 MCG/ACT inhaler, Inhale 2 puffs Every 6 (Six) Hours As Needed for Wheezing or Shortness of Air., Disp: 1 inhaler, Rfl: 5  •  levothyroxine (SYNTHROID, LEVOTHROID) 50 MCG tablet, Take 50 mcg by mouth Daily., Disp: , Rfl:   •  montelukast (SINGULAIR) 10 MG tablet, Take 10 mg by mouth Every Night., Disp: , Rfl:   •  omeprazole (priLOSEC) 20 MG capsule, Take 20 mg by mouth Daily., Disp: , Rfl:   •  Polyethylene Glycol 3350 (MIRALAX PO), Take 1 dose by mouth Daily., Disp:  , Rfl:   •  rOPINIRole (REQUIP) 1 MG tablet, Take 1 mg by mouth Every Night. Take 1 hour before bedtime., Disp: , Rfl:   •  sertraline (ZOLOFT) 25 MG tablet, Take 25 mg by mouth Daily., Disp: , Rfl:   •  simvastatin (ZOCOR) 40 MG tablet, Take 40 mg by mouth Every Evening., Disp: , Rfl: 2  •  tiotropium bromide monohydrate (SPIRIVA RESPIMAT) 2.5 MCG/ACT aerosol solution inhaler, Inhale 2 puffs Daily., Disp: 1 inhaler, Rfl: 5  •  topiramate (TOPAMAX) 50 MG tablet, Take 50 mg by mouth 2 (Two) Times a Day., Disp: , Rfl:     Physical Exam:  Physical Exam  Pulmonary:      Effort: Pulmonary effort is normal.   Neurological:      Mental Status: He is alert.   Psychiatric:         Mood and Affect: Mood normal.         Labs/Imaging:  CT Chest With & Without Contrast Diagnostic    Result Date: 10/19/2021  No suspicious pulmonary mass or nodule. No evidence of recurrent or metastatic disease in the chest.  741.29 mGy.cm   This study was performed with techniques to keep radiation doses as low as reasonably achievable (ALARA). Individualized dose reduction techniques using automated exposure control or adjustment of mA and/or kV according to the patient size were employed.     Images were reviewed, interpreted, and dictated by Dr. Shanel Hazel M.D. Transcribed by Ria Oneal PA-C.  This report was finalized on 10/19/2021 12:28 PM by Shanel Hazel M.D..  Stable calcified nodule RLL 5 mm    Personally reviewed     Assessment / Plan:  Diagnoses and all orders for this visit:    1. RLL adenocarcinoma; s/p RUL lobectomy/mediastinal lymph node dissection (Dr. Nance, 8/22/19) (Primary)         Angi Marinelli is a 76 y.o. male with a history of hypertension, hyperlipidemia, COPD, ongoing tobacco use, papillary thyroid carcinoma s/p partial thyroidectomy, AAA s/p endograft repair 2008 with Dr. 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 Nance.   Discussed findings of CT chest with no recurrence or metastatic disease.  Stable calcified RLL nodule.  We will plan to follow-up in 6 months with repeat CT chest and already scheduled annual aortic ultrasound.  At that time, if CT chest remains stable with no recurrence, we will plan on moving CT scan of chest to 1 year since it will be almost 3 years since surgery.  Pt continues to smoke and is not interested in cessation.      This visit has been rescheduled as a phone visit to comply with patient safety concerns in accordance with CDC recommendations. Total time of discussion was 15 minutes.     SHASHANK Hooks  [facility] Cardiothoracic Surgery  11/04/21  09:46 EDT

## 2021-12-09 ENCOUNTER — TRANSCRIBE ORDERS (OUTPATIENT)
Dept: ADMINISTRATIVE | Facility: HOSPITAL | Age: 76
End: 2021-12-09

## 2021-12-09 DIAGNOSIS — R04.2 COUGH WITH HEMOPTYSIS: Primary | ICD-10-CM

## 2021-12-15 ENCOUNTER — HOSPITAL ENCOUNTER (OUTPATIENT)
Dept: CT IMAGING | Facility: HOSPITAL | Age: 76
Discharge: HOME | End: 2021-12-15
Admitting: FAMILY MEDICINE

## 2021-12-15 DIAGNOSIS — R04.2 COUGH WITH HEMOPTYSIS: ICD-10-CM

## 2021-12-15 PROCEDURE — 82565 ASSAY OF CREATININE: CPT

## 2021-12-15 PROCEDURE — 71260 CT THORAX DX C+: CPT

## 2021-12-15 PROCEDURE — 25010000002 IOPAMIDOL 61 % SOLUTION: Performed by: FAMILY MEDICINE

## 2021-12-15 RX ADMIN — IOPAMIDOL 100 ML: 612 INJECTION, SOLUTION INTRAVENOUS at 17:58

## 2021-12-16 LAB — CREAT BLDA-MCNC: 0.8 MG/DL (ref 0.6–1.3)

## 2022-02-10 ENCOUNTER — TRANSCRIBE ORDERS (OUTPATIENT)
Dept: LAB | Facility: HOSPITAL | Age: 77
End: 2022-02-10

## 2022-02-10 ENCOUNTER — LAB (OUTPATIENT)
Dept: LAB | Facility: HOSPITAL | Age: 77
End: 2022-02-10

## 2022-02-10 DIAGNOSIS — Z85.850 PERSONAL HISTORY OF MALIGNANT NEOPLASM OF THYROID: Primary | ICD-10-CM

## 2022-02-10 DIAGNOSIS — Z85.850 PERSONAL HISTORY OF MALIGNANT NEOPLASM OF THYROID: ICD-10-CM

## 2022-02-10 LAB
ALBUMIN SERPL-MCNC: 4.1 G/DL (ref 3.5–5.2)
ALBUMIN/GLOB SERPL: 1.2 G/DL
ALP SERPL-CCNC: 71 U/L (ref 39–117)
ALT SERPL W P-5'-P-CCNC: 9 U/L (ref 1–41)
ANION GAP SERPL CALCULATED.3IONS-SCNC: 10.4 MMOL/L (ref 5–15)
AST SERPL-CCNC: 15 U/L (ref 1–40)
BASOPHILS # BLD AUTO: 0.08 10*3/MM3 (ref 0–0.2)
BASOPHILS NFR BLD AUTO: 0.8 % (ref 0–1.5)
BILIRUB SERPL-MCNC: 0.3 MG/DL (ref 0–1.2)
BUN SERPL-MCNC: 15 MG/DL (ref 8–23)
BUN/CREAT SERPL: 13.3 (ref 7–25)
CALCIUM SPEC-SCNC: 9.6 MG/DL (ref 8.6–10.5)
CHLORIDE SERPL-SCNC: 109 MMOL/L (ref 98–107)
CO2 SERPL-SCNC: 22.6 MMOL/L (ref 22–29)
CREAT SERPL-MCNC: 1.13 MG/DL (ref 0.76–1.27)
DEPRECATED RDW RBC AUTO: 47.8 FL (ref 37–54)
EOSINOPHIL # BLD AUTO: 0.24 10*3/MM3 (ref 0–0.4)
EOSINOPHIL NFR BLD AUTO: 2.4 % (ref 0.3–6.2)
ERYTHROCYTE [DISTWIDTH] IN BLOOD BY AUTOMATED COUNT: 14.8 % (ref 12.3–15.4)
GFR SERPL CREATININE-BSD FRML MDRD: 63 ML/MIN/1.73
GLOBULIN UR ELPH-MCNC: 3.3 GM/DL
GLUCOSE SERPL-MCNC: 105 MG/DL (ref 65–99)
HCT VFR BLD AUTO: 43.9 % (ref 37.5–51)
HGB BLD-MCNC: 14.4 G/DL (ref 13–17.7)
IMM GRANULOCYTES # BLD AUTO: 0.05 10*3/MM3 (ref 0–0.05)
IMM GRANULOCYTES NFR BLD AUTO: 0.5 % (ref 0–0.5)
LYMPHOCYTES # BLD AUTO: 2.95 10*3/MM3 (ref 0.7–3.1)
LYMPHOCYTES NFR BLD AUTO: 29.7 % (ref 19.6–45.3)
MCH RBC QN AUTO: 29 PG (ref 26.6–33)
MCHC RBC AUTO-ENTMCNC: 32.8 G/DL (ref 31.5–35.7)
MCV RBC AUTO: 88.5 FL (ref 79–97)
MONOCYTES # BLD AUTO: 0.86 10*3/MM3 (ref 0.1–0.9)
MONOCYTES NFR BLD AUTO: 8.7 % (ref 5–12)
NEUTROPHILS NFR BLD AUTO: 5.74 10*3/MM3 (ref 1.7–7)
NEUTROPHILS NFR BLD AUTO: 57.9 % (ref 42.7–76)
NRBC BLD AUTO-RTO: 0 /100 WBC (ref 0–0.2)
PLATELET # BLD AUTO: 231 10*3/MM3 (ref 140–450)
PMV BLD AUTO: 9.5 FL (ref 6–12)
POTASSIUM SERPL-SCNC: 3.7 MMOL/L (ref 3.5–5.2)
PROT SERPL-MCNC: 7.4 G/DL (ref 6–8.5)
RBC # BLD AUTO: 4.96 10*6/MM3 (ref 4.14–5.8)
SODIUM SERPL-SCNC: 142 MMOL/L (ref 136–145)
T4 FREE SERPL-MCNC: 1.25 NG/DL (ref 0.93–1.7)
TSH SERPL DL<=0.05 MIU/L-ACNC: 1.51 UIU/ML (ref 0.27–4.2)
WBC NRBC COR # BLD: 9.92 10*3/MM3 (ref 3.4–10.8)

## 2022-02-10 PROCEDURE — 36415 COLL VENOUS BLD VENIPUNCTURE: CPT

## 2022-02-10 PROCEDURE — 80053 COMPREHEN METABOLIC PANEL: CPT

## 2022-02-10 PROCEDURE — 84443 ASSAY THYROID STIM HORMONE: CPT

## 2022-02-10 PROCEDURE — 84439 ASSAY OF FREE THYROXINE: CPT

## 2022-02-10 PROCEDURE — 85025 COMPLETE CBC W/AUTO DIFF WBC: CPT

## 2022-02-16 ENCOUNTER — TRANSCRIBE ORDERS (OUTPATIENT)
Dept: LAB | Facility: HOSPITAL | Age: 77
End: 2022-02-16

## 2022-02-16 DIAGNOSIS — I51.9 MYXEDEMA HEART DISEASE: Primary | ICD-10-CM

## 2022-02-16 DIAGNOSIS — E03.9 MYXEDEMA HEART DISEASE: Primary | ICD-10-CM

## 2022-04-04 DIAGNOSIS — I71.40 AAA (ABDOMINAL AORTIC ANEURYSM) WITHOUT RUPTURE: Primary | ICD-10-CM

## 2022-04-04 DIAGNOSIS — C34.31 MALIGNANT NEOPLASM OF LOWER LOBE OF RIGHT LUNG: ICD-10-CM

## 2022-04-15 ENCOUNTER — LAB (OUTPATIENT)
Dept: LAB | Facility: HOSPITAL | Age: 77
End: 2022-04-15

## 2022-04-15 DIAGNOSIS — E03.9 MYXEDEMA HEART DISEASE: ICD-10-CM

## 2022-04-15 DIAGNOSIS — I51.9 MYXEDEMA HEART DISEASE: ICD-10-CM

## 2022-04-15 LAB
T4 FREE SERPL-MCNC: 1.27 NG/DL (ref 0.93–1.7)
TSH SERPL DL<=0.05 MIU/L-ACNC: 1.11 UIU/ML (ref 0.27–4.2)

## 2022-04-15 PROCEDURE — 84443 ASSAY THYROID STIM HORMONE: CPT

## 2022-04-15 PROCEDURE — 84439 ASSAY OF FREE THYROXINE: CPT

## 2022-04-15 PROCEDURE — 36415 COLL VENOUS BLD VENIPUNCTURE: CPT

## 2022-04-29 ENCOUNTER — HOSPITAL ENCOUNTER (OUTPATIENT)
Dept: ULTRASOUND IMAGING | Facility: HOSPITAL | Age: 77
Discharge: HOME | End: 2022-04-29

## 2022-04-29 ENCOUNTER — HOSPITAL ENCOUNTER (OUTPATIENT)
Dept: CT IMAGING | Facility: HOSPITAL | Age: 77
Discharge: HOME | End: 2022-04-29

## 2022-04-29 DIAGNOSIS — C34.31 MALIGNANT NEOPLASM OF LOWER LOBE OF RIGHT LUNG: ICD-10-CM

## 2022-04-29 DIAGNOSIS — I71.40 AAA (ABDOMINAL AORTIC ANEURYSM) WITHOUT RUPTURE: ICD-10-CM

## 2022-04-29 PROCEDURE — 71270 CT THORAX DX C-/C+: CPT

## 2022-04-29 PROCEDURE — 76706 US ABDL AORTA SCREEN AAA: CPT

## 2022-04-29 PROCEDURE — 25010000002 IOPAMIDOL 61 % SOLUTION: Performed by: NURSE PRACTITIONER

## 2022-04-29 RX ADMIN — IOPAMIDOL 100 ML: 612 INJECTION, SOLUTION INTRAVENOUS at 09:35

## 2022-05-03 ENCOUNTER — OFFICE VISIT (OUTPATIENT)
Dept: CARDIAC SURGERY | Facility: CLINIC | Age: 77
End: 2022-05-03

## 2022-05-03 ENCOUNTER — APPOINTMENT (OUTPATIENT)
Dept: CT IMAGING | Facility: HOSPITAL | Age: 77
End: 2022-05-03

## 2022-05-03 VITALS
BODY MASS INDEX: 25.47 KG/M2 | HEIGHT: 72 IN | DIASTOLIC BLOOD PRESSURE: 71 MMHG | SYSTOLIC BLOOD PRESSURE: 128 MMHG | HEART RATE: 67 BPM | TEMPERATURE: 98.2 F | OXYGEN SATURATION: 98 % | WEIGHT: 188 LBS

## 2022-05-03 DIAGNOSIS — I71.40 AAA (ABDOMINAL AORTIC ANEURYSM) WITHOUT RUPTURE: ICD-10-CM

## 2022-05-03 DIAGNOSIS — C34.31 MALIGNANT NEOPLASM OF LOWER LOBE OF RIGHT LUNG: Primary | ICD-10-CM

## 2022-05-03 PROBLEM — I63.9 CEREBROVASCULAR ACCIDENT: Status: ACTIVE | Noted: 2019-07-12

## 2022-05-03 PROBLEM — D50.9 IRON DEFICIENCY ANEMIA: Status: ACTIVE | Noted: 2019-12-21

## 2022-05-03 PROBLEM — N13.8 BENIGN PROSTATIC HYPERPLASIA WITH URINARY OBSTRUCTION: Status: ACTIVE | Noted: 2017-09-05

## 2022-05-03 PROBLEM — N40.1 BENIGN PROSTATIC HYPERPLASIA WITH URINARY OBSTRUCTION: Status: ACTIVE | Noted: 2017-09-05

## 2022-05-03 PROCEDURE — 99214 OFFICE O/P EST MOD 30 MIN: CPT | Performed by: NURSE PRACTITIONER

## 2022-05-03 NOTE — PROGRESS NOTES
[facility] Cardiothoracic Surgery Office Follow Up Note     Date of Encounter: 2022     Name: Angi Marinelli  : 1945     Referred By: No ref. provider found  PCP: Georgiana Cortés DO    Chief Complaint:    Chief Complaint   Patient presents with   • Follow-up     6 mo fu with Ct chest and US AAA.        Subjective      History of Present Illness:    Angi Marinelli is a very pleasant 76 y.o. male current smoker with history of papillary thyroid carcinoma s/p partial thyroidectomy, COPD, HTN, HLD on statin therapy, AAA s/p EVAR in  by Dr. Cheung, and stage IB invasive moderately differentiated adenocarcinoma of the right lung s/p RUL lobectomy 2019 with Dr. Nance.  Surgical margins negative with vascular invasion identified ([id number]).  Patient was last seen in clinic 2021 with CT chest demonstrating no recurrent or metastatic disease and a stable calcified RLL nodule.  He presents today with new CT chest and abdominal ultrasound. He had no complaints of chronic SOA/TRIPP.  He does suffer from chronic nonproductive cough, has never had hemoptysis.  He denies any constitutional symptoms.  He reports good appetite with no weight loss.  He is followed by oncologist Dr. Bruno with [facility] who travels to [place] for his hx of thyroid cancer.    Review of Systems:  Review of Systems   Constitutional: Negative for chills, decreased appetite, diaphoresis, fever, malaise/fatigue, night sweats, weight gain and weight loss.   HENT: Negative for hoarse voice.    Eyes: Negative for blurred vision, double vision and visual disturbance.   Cardiovascular: Negative for chest pain, claudication, dyspnea on exertion, irregular heartbeat, leg swelling, near-syncope, orthopnea, palpitations, paroxysmal nocturnal dyspnea and syncope.   Respiratory: Negative for cough, hemoptysis, shortness of breath, sputum production and wheezing.    Hematologic/Lymphatic: Negative for adenopathy and bleeding problem. Does  not bruise/bleed easily.   Skin: Negative for color change, nail changes, poor wound healing and rash.   Musculoskeletal: Negative for back pain, falls and muscle cramps.   Gastrointestinal: Negative for abdominal pain, dysphagia and heartburn.   Genitourinary: Negative for flank pain.   Neurological: Positive for numbness ( RT side has tingling . sharp pains when touched ). Negative for brief paralysis, disturbances in coordination, dizziness, focal weakness, headaches, light-headedness, loss of balance, paresthesias, sensory change, vertigo and weakness.   Psychiatric/Behavioral: Negative for depression and suicidal ideas.   Allergic/Immunologic: Negative for persistent infections.       I have reviewed the following portions of the patient's history: allergies, current medications, past family history, past medical history, past social history, past surgical history and problem list and confirm it's accurate.    Allergies:  Allergies   Allergen Reactions   • Penicillins Anaphylaxis, Swelling and Rash       Medications:      Current Outpatient Medications:   •  amLODIPine (NORVASC) 10 MG tablet, Take 10 mg by mouth Daily., Disp: , Rfl:   •  aspirin 325 MG tablet, Take 325 mg by mouth Daily., Disp: , Rfl:   •  clonazePAM (KLONOPIN) 0.5 MG tablet, Take 1 tablet by mouth Every Night., Disp: 30 tablet, Rfl: 3  •  docusate sodium (COLACE) 100 MG capsule, Take 100 mg by mouth At Night As Needed for Constipation., Disp: , Rfl:   •  finasteride (PROSCAR) 5 MG tablet, Take 5 mg by mouth Daily., Disp: , Rfl:   •  HYDROcodone-acetaminophen (NORCO) 5-325 MG per tablet, Take 1 tablet by mouth 2 (Two) Times a Day., Disp: 30 tablet, Rfl: 0  •  ipratropium-albuterol (DUO-NEB) 0.5-2.5 mg/3 ml nebulizer, 3 mL Every 6 (Six) Hours As Needed for Wheezing or Shortness of Air., Disp: , Rfl:   •  levalbuterol (XOPENEX HFA) 45 MCG/ACT inhaler, Inhale 2 puffs Every 6 (Six) Hours As Needed for Wheezing or Shortness of Air., Disp: 1 inhaler,  Rfl: 5  •  levothyroxine (SYNTHROID, LEVOTHROID) 50 MCG tablet, Take 75 mcg by mouth Daily., Disp: , Rfl:   •  montelukast (SINGULAIR) 10 MG tablet, Take 10 mg by mouth Every Night., Disp: , Rfl:   •  omeprazole (priLOSEC) 20 MG capsule, Take 20 mg by mouth Daily., Disp: , Rfl:   •  Polyethylene Glycol 3350 (MIRALAX PO), Take 1 dose by mouth Daily., Disp: , Rfl:   •  rOPINIRole (REQUIP) 1 MG tablet, Take 1 mg by mouth Every Night. Take 1 hour before bedtime., Disp: , Rfl:   •  sertraline (ZOLOFT) 25 MG tablet, Take 25 mg by mouth Daily., Disp: , Rfl:   •  simvastatin (ZOCOR) 40 MG tablet, Take 40 mg by mouth Every Evening., Disp: , Rfl: 2  •  tiotropium bromide monohydrate (SPIRIVA RESPIMAT) 2.5 MCG/ACT aerosol solution inhaler, Inhale 2 puffs Daily., Disp: 1 inhaler, Rfl: 5  •  topiramate (TOPAMAX) 50 MG tablet, Take 50 mg by mouth 2 (Two) Times a Day., Disp: , Rfl:     History:   Past Medical History:   Diagnosis Date   • Abnormal CT lung screening    • Arthritis     hip    • COPD (chronic obstructive pulmonary disease) (HCC)    • Full dentures    • GERD (gastroesophageal reflux disease)    • Heat stroke     x 4   • Hyperlipidemia    • Hypertension    • Lung cancer (HCC)    • Migraine    • On home oxygen therapy     given 6-2019 but has not used   • Pneumonia 2019   • PONV (postoperative nausea and vomiting)    • Restless leg syndrome    • Skin cancer     right side of face removed    • Thyroid cancer (HCC)    • Tobacco abuse    • Wears reading eyeglasses        Past Surgical History:   Procedure Laterality Date   • ARTERIAL ANEURYSM REPAIR  2008    AAA Repair by Dr. Omi Cheung   • BRONCHOSCOPY N/A 6/27/2019    Procedure: BRONCHOSCOPY WITH ENDOBRONCHIAL ULTRASOUND;  Surgeon: Iggy Beard MD;  Location: [facility] OR;  Service: Pulmonary   • BRONCHOSCOPY N/A 8/22/2019    Procedure: BRONCHOSCOPY;  Surgeon: Shabbir Nance MD;  Location: [facility] OR;  Service: Cardiothoracic   • CATARACT EXTRACTION Bilateral      "Cataract extraction with IOL   • LYMPH NODE DISSECTION Right 8/22/2019    Procedure: MEDIASTINAL LYMPH NODE DISSECTION RIGHT;  Surgeon: Shabbir Nance MD;  Location:  [facility] OR;  Service: Cardiothoracic   • SKIN BIOPSY      right face   • THORACOSCOPY Right 8/22/2019    Procedure: THORACOSCOPY VIDEO ASSISTED WITH RIGHT UPPER LOBECTOMY, INTERCOSTAL NERVE BLOCKS WITH CRYOABLATION;  Surgeon: Shabbir Nance MD;  Location:  [facility] OR;  Service: Cardiothoracic   • THYROIDECTOMY, PARTIAL Right 11/13/2019    lymph nodes also       Social History     Socioeconomic History   • Marital status:    • Number of children: 5   Tobacco Use   • Smoking status: Current Every Day Smoker     Packs/day: 1.00     Years: 57.00     Pack years: 57.00     Types: Cigarettes   • Smokeless tobacco: Never Used   • Tobacco comment: smokes 1/2 ppd as of 11/4/21   Vaping Use   • Vaping Use: Never used   Substance and Sexual Activity   • Alcohol use: No   • Drug use: No   • Sexual activity: Defer        Family History   Problem Relation Age of Onset   • Hypertension Mother    • Stroke Father        Objective   Physical Exam:  Vitals:    05/03/22 0934   BP: 128/71   BP Location: Right arm   Pulse: 67   Temp: 98.2 °F (36.8 °C)   SpO2: 98%   Weight: 85.3 kg (188 lb)   Height: 182.9 cm (72\")      Body mass index is 25.5 kg/m².    Physical Exam  Vitals and nursing note reviewed.   Constitutional:       General: He is awake.      Appearance: Normal appearance.   HENT:      Head: Normocephalic and atraumatic.   Eyes:      Pupils: Pupils are equal, round, and reactive to light.   Cardiovascular:      Rate and Rhythm: Normal rate and regular rhythm.      Pulses: Normal pulses.      Heart sounds: Normal heart sounds, S1 normal and S2 normal.   Pulmonary:      Effort: Pulmonary effort is normal.      Breath sounds: No decreased air movement. Examination of the right-upper field reveals rhonchi. Rhonchi present.      Comments: Clear with coughing  Chest: "   Breasts:      Right: No axillary adenopathy or supraclavicular adenopathy.      Left: No axillary adenopathy or supraclavicular adenopathy.       Abdominal:      Palpations: Abdomen is soft.   Musculoskeletal:         General: Normal range of motion.      Cervical back: Normal range of motion and neck supple.      Right lower leg: No edema.      Left lower leg: No edema.   Lymphadenopathy:      Cervical: No cervical adenopathy.      Right cervical: No superficial, deep or posterior cervical adenopathy.     Left cervical: No superficial, deep or posterior cervical adenopathy.      Upper Body:      Right upper body: No supraclavicular or axillary adenopathy.      Left upper body: No supraclavicular or axillary adenopathy.   Skin:     General: Skin is warm and dry.      Capillary Refill: Capillary refill takes less than 2 seconds.      Findings: No lesion.      Nails: There is no clubbing.      Comments: Thoracotomy scar without complication   Neurological:      General: No focal deficit present.      Mental Status: He is alert and oriented to person, place, and time. Mental status is at baseline.      GCS: GCS eye subscore is 4. GCS verbal subscore is 5. GCS motor subscore is 6.      Cranial Nerves: Cranial nerves are intact.      Sensory: Sensation is intact.      Motor: Motor function is intact.      Coordination: Coordination is intact.      Gait: Gait is intact.   Psychiatric:         Mood and Affect: Mood and affect normal. Mood is not depressed.         Speech: Speech normal.         Behavior: Behavior normal. Behavior is cooperative.         Thought Content: Thought content normal.         Judgment: Judgment normal.         Imaging/Labs:  US aaa screen limited-Result Date: 4/29/2022  No evidence of an abdominal aneurysm.      This report was signed and finalized on 4/29/2022 10:08 AM by Shanel Hazel M.D..    CT Chest With & Without Contrast Diagnostic-Result Date: 4/29/2022  No evidence of metastatic  disease within the chest.   This study was performed with techniques to keep radiation doses as low as reasonably achievable (ALARA). Individualized dose reduction techniques using automated exposure control or adjustment of mA and/or kV according to the patient size were employed.  This report was signed and finalized on 4/29/2022 9:49 AM by Shanel Hazel M.D..    CT Chest With & Without Contrast Diagnostic-Result Date: 12/15/2021  Emphysematous changes with no acute cardiopulmonary process.     CT Chest With & Without Contrast Diagnostic-Result Date: 10/19/2021  No suspicious pulmonary mass or nodule. No evidence of recurrent or metastatic disease in the chest.    CT Abdomen Pelvis-4/14/2021  The patient is status post stent graft repair of an abdominal aortic aneurysm with the maximum AP dimension of the aorta measuring 3.2 cm. There is no evidence of an endoleak.  IMPRESSION: No acute intra-abdominal or pelvic process.    CT Chest With & Without Contrast Diagnostic-Result Date: 2/8/2021  Stable appearance of the chest with background emphysematous changes and postsurgical changes of the right lung. No new or enlarging suspicious pulmonary nodules.    CT Chest With & Without Contrast Diagnostic-Result Date: 7/28/2020  Postsurgical changes identified within the right lower lung field. Volume loss is present with no evidence of local recurrence or metastatic disease. Underlying chronic and emphysematous change is seen throughout the lung fields.    CT Lung Screening-5/23/2019  Lungs and airways: The central airways are patent. Emphysematous changes are noted. There is tree in bud nodular opacity of the right middle lobe, image 93 of series 2. A calcified granuloma is noted of the right lung base.   Nodules, right lung: At the right apex, there is a 2.6 x 2.1 cm spiculated nodule, image 19 of series 2. A 4 mm nodule is noted on the right along the major fissure, image 47 of series 2.   Nodules, left lung:    None.   Pleural: Normal.   Mediastinum: The heart is normal in size. No pericardial effusion. Thoracic aorta is normal in caliber. There is atherosclerotic calcification of the aortic arch and primary branches, including the coronaries.   Lymph nodes: No discrete abnormal thoracic lymphadenopathy, however evaluation is limited due to lack of intravenous contrast and technique. A subcarinal node is noted measuring up to 7 mm in short axis, image 57 of series 2. Additional calcified   mediastinal hilar lymph nodes are noted.   Chest wall: Normal.   Bones: No suspicious lytic or blastic lesions.   Upper abdomen: Multiple hepatic hypodense lesions are noted which are incompletely assessed. For example, within the right hepatic lobe, there is a 2.8 cm hypodense lesion, image 113 of series 2, and in the left hepatic lobe, a 1.9 cm hypodense lesion,   image 110 of series 2. Additional lesions are noted.      Assessment / Plan      Assessment / Plan:  Diagnoses and all orders for this visit:    1. RLL adenocarcinoma; s/p RUL lobectomy/mediastinal lymph node dissection (Dr. Nance, 8/22/19) (Primary)    2. AAA (abdominal aortic aneurysm) without rupture (HCC)       · Current smoker with stage IB invasive moderately differentiated adenocarcinoma of the right lung s/p RUL lobectomy 8/2019 with Dr. Nance.    · Surgical margins negative with vascular invasion identified ([id number]).    · Last seen in clinic 11/2021 with CT chest demonstrating no recurrent or metastatic disease and a stable calcified RLL nodule.   · Stable respiratory symptoms with no constitutional symptoms.    · CT imaging personally reviewed with no evidence of disease recurrence.  2mm nodule to anterior RUL unchanged from comparison imaging, adjacent to bleb  · As patient is approaching 3 years postop, we discussed moving patient out to annual LDCT for surveillance per NCCN guidelines  · Patient is followed by oncologist Dr. Bruno with [facility] who travels to  [place] for his hx of thyroid cancer but would like us to continue to follow his lung cancer  · AAA s/p EVAR in 2008 by Dr. Cheung with abdominal ultrasound showing already measuring 3.2 cm.  CT abdomen/pelvis from 4/2021 with no evidence of endoleak.  We discussed moving surveillance out to biennially   · Pt is very please with his plan of care and knows to contact our office for any worrisome s/s  · Smoking cessation of utmost importance. Pt does not seem inclined to quit    Patient Education:  Angi Marinelli  reports that he has been smoking cigarettes. He has a 57.00 pack-year smoking history. He has never used smokeless tobacco.. I have educated him on the risk of diseases from using tobacco products such as cancer, COPD and heart disease. I advised him to quit and he is not willing to quit. I spent 3  minutes counseling the patient.     Follow Up:   Return in about 1 year (around 5/3/2023) for Imaging next visit: LDCT .   Or sooner for any further concerns or worsening sign and symptoms. If unable to reach us in the office please dial 911 or go to the nearest emergency department.      [place] [facility]NCARNA[employer]  [facility] Cardiothoracic Surgery    Time Spent: I spent 31 minutes caring for Angi on this date of service. This time includes time spent by me in the following activities: preparing for the visit, reviewing tests, obtaining and/or reviewing a separately obtained history, performing a medically appropriate examination and/or evaluation, counseling and educating the patient/family/caregiver, ordering medications, tests, or procedures, documenting information in the medical record, independently interpreting results and communicating that information with the patient/family/caregiver and care coordination.

## 2022-07-11 ENCOUNTER — TRANSCRIBE ORDERS (OUTPATIENT)
Dept: LAB | Facility: HOSPITAL | Age: 77
End: 2022-07-11

## 2022-07-11 DIAGNOSIS — D50.9 IRON DEFICIENCY ANEMIA, UNSPECIFIED IRON DEFICIENCY ANEMIA TYPE: Primary | ICD-10-CM

## 2022-07-11 DIAGNOSIS — Z85.850 PERSONAL HISTORY OF MALIGNANT NEOPLASM OF THYROID: ICD-10-CM

## 2022-07-15 ENCOUNTER — LAB (OUTPATIENT)
Dept: LAB | Facility: HOSPITAL | Age: 77
End: 2022-07-15

## 2022-07-15 DIAGNOSIS — D50.9 IRON DEFICIENCY ANEMIA, UNSPECIFIED IRON DEFICIENCY ANEMIA TYPE: ICD-10-CM

## 2022-07-15 DIAGNOSIS — Z85.850 PERSONAL HISTORY OF MALIGNANT NEOPLASM OF THYROID: ICD-10-CM

## 2022-07-15 LAB
ALBUMIN SERPL-MCNC: 4 G/DL (ref 3.5–5.2)
ALBUMIN/GLOB SERPL: 1.5 G/DL
ALP SERPL-CCNC: 70 U/L (ref 39–117)
ALT SERPL W P-5'-P-CCNC: 7 U/L (ref 1–41)
ANION GAP SERPL CALCULATED.3IONS-SCNC: 12.9 MMOL/L (ref 5–15)
AST SERPL-CCNC: 14 U/L (ref 1–40)
BASOPHILS # BLD AUTO: 0.09 10*3/MM3 (ref 0–0.2)
BASOPHILS NFR BLD AUTO: 1 % (ref 0–1.5)
BILIRUB SERPL-MCNC: 0.3 MG/DL (ref 0–1.2)
BUN SERPL-MCNC: 13 MG/DL (ref 8–23)
BUN/CREAT SERPL: 11.8 (ref 7–25)
CALCIUM SPEC-SCNC: 9.1 MG/DL (ref 8.6–10.5)
CHLORIDE SERPL-SCNC: 110 MMOL/L (ref 98–107)
CO2 SERPL-SCNC: 21.1 MMOL/L (ref 22–29)
CREAT SERPL-MCNC: 1.1 MG/DL (ref 0.76–1.27)
DEPRECATED RDW RBC AUTO: 48.7 FL (ref 37–54)
EGFRCR SERPLBLD CKD-EPI 2021: 68.7 ML/MIN/1.73
EOSINOPHIL # BLD AUTO: 0.25 10*3/MM3 (ref 0–0.4)
EOSINOPHIL NFR BLD AUTO: 2.8 % (ref 0.3–6.2)
ERYTHROCYTE [DISTWIDTH] IN BLOOD BY AUTOMATED COUNT: 15.3 % (ref 12.3–15.4)
GLOBULIN UR ELPH-MCNC: 2.6 GM/DL
GLUCOSE SERPL-MCNC: 108 MG/DL (ref 65–99)
HCT VFR BLD AUTO: 40.8 % (ref 37.5–51)
HGB BLD-MCNC: 13.9 G/DL (ref 13–17.7)
IMM GRANULOCYTES # BLD AUTO: 0.03 10*3/MM3 (ref 0–0.05)
IMM GRANULOCYTES NFR BLD AUTO: 0.3 % (ref 0–0.5)
LYMPHOCYTES # BLD AUTO: 2.85 10*3/MM3 (ref 0.7–3.1)
LYMPHOCYTES NFR BLD AUTO: 31.6 % (ref 19.6–45.3)
MCH RBC QN AUTO: 29.7 PG (ref 26.6–33)
MCHC RBC AUTO-ENTMCNC: 34.1 G/DL (ref 31.5–35.7)
MCV RBC AUTO: 87.2 FL (ref 79–97)
MONOCYTES # BLD AUTO: 0.65 10*3/MM3 (ref 0.1–0.9)
MONOCYTES NFR BLD AUTO: 7.2 % (ref 5–12)
NEUTROPHILS NFR BLD AUTO: 5.16 10*3/MM3 (ref 1.7–7)
NEUTROPHILS NFR BLD AUTO: 57.1 % (ref 42.7–76)
NRBC BLD AUTO-RTO: 0 /100 WBC (ref 0–0.2)
PLATELET # BLD AUTO: 217 10*3/MM3 (ref 140–450)
PMV BLD AUTO: 9.9 FL (ref 6–12)
POTASSIUM SERPL-SCNC: 3.5 MMOL/L (ref 3.5–5.2)
PROT SERPL-MCNC: 6.6 G/DL (ref 6–8.5)
RBC # BLD AUTO: 4.68 10*6/MM3 (ref 4.14–5.8)
SODIUM SERPL-SCNC: 144 MMOL/L (ref 136–145)
T4 FREE SERPL-MCNC: 1.37 NG/DL (ref 0.93–1.7)
TSH SERPL DL<=0.05 MIU/L-ACNC: 0.66 UIU/ML (ref 0.27–4.2)
WBC NRBC COR # BLD: 9.03 10*3/MM3 (ref 3.4–10.8)

## 2022-07-15 PROCEDURE — 80053 COMPREHEN METABOLIC PANEL: CPT

## 2022-07-15 PROCEDURE — 85025 COMPLETE CBC W/AUTO DIFF WBC: CPT

## 2022-07-15 PROCEDURE — 36415 COLL VENOUS BLD VENIPUNCTURE: CPT

## 2022-07-15 PROCEDURE — 84439 ASSAY OF FREE THYROXINE: CPT

## 2022-07-15 PROCEDURE — 84443 ASSAY THYROID STIM HORMONE: CPT

## 2023-02-03 ENCOUNTER — LAB (OUTPATIENT)
Dept: LAB | Facility: HOSPITAL | Age: 78
End: 2023-02-03
Payer: MEDICARE

## 2023-02-03 ENCOUNTER — TRANSCRIBE ORDERS (OUTPATIENT)
Dept: LAB | Facility: HOSPITAL | Age: 78
End: 2023-02-03
Payer: MEDICARE

## 2023-02-03 DIAGNOSIS — C34.90 MALIGNANT NEOPLASM OF BRONCHUS AND LUNG: ICD-10-CM

## 2023-02-03 DIAGNOSIS — C34.90 MALIGNANT NEOPLASM OF BRONCHUS AND LUNG: Primary | ICD-10-CM

## 2023-02-03 LAB
ALBUMIN SERPL-MCNC: 3.7 G/DL (ref 3.5–5.2)
ALBUMIN/GLOB SERPL: 1.4 G/DL
ALP SERPL-CCNC: 52 U/L (ref 39–117)
ALT SERPL W P-5'-P-CCNC: 8 U/L (ref 1–41)
ANION GAP SERPL CALCULATED.3IONS-SCNC: 10.1 MMOL/L (ref 5–15)
AST SERPL-CCNC: 11 U/L (ref 1–40)
BASOPHILS # BLD AUTO: 0.08 10*3/MM3 (ref 0–0.2)
BASOPHILS NFR BLD AUTO: 0.8 % (ref 0–1.5)
BILIRUB SERPL-MCNC: 0.3 MG/DL (ref 0–1.2)
BUN SERPL-MCNC: 16 MG/DL (ref 8–23)
BUN/CREAT SERPL: 13.4 (ref 7–25)
CALCIUM SPEC-SCNC: 9.3 MG/DL (ref 8.6–10.5)
CHLORIDE SERPL-SCNC: 110 MMOL/L (ref 98–107)
CO2 SERPL-SCNC: 21.9 MMOL/L (ref 22–29)
CREAT SERPL-MCNC: 1.19 MG/DL (ref 0.76–1.27)
DEPRECATED RDW RBC AUTO: 52.7 FL (ref 37–54)
EGFRCR SERPLBLD CKD-EPI 2021: 62.5 ML/MIN/1.73
EOSINOPHIL # BLD AUTO: 0.2 10*3/MM3 (ref 0–0.4)
EOSINOPHIL NFR BLD AUTO: 1.9 % (ref 0.3–6.2)
ERYTHROCYTE [DISTWIDTH] IN BLOOD BY AUTOMATED COUNT: 16.5 % (ref 12.3–15.4)
GLOBULIN UR ELPH-MCNC: 2.6 GM/DL
GLUCOSE SERPL-MCNC: 106 MG/DL (ref 65–99)
HCT VFR BLD AUTO: 39.3 % (ref 37.5–51)
HGB BLD-MCNC: 13.2 G/DL (ref 13–17.7)
IMM GRANULOCYTES # BLD AUTO: 0.07 10*3/MM3 (ref 0–0.05)
IMM GRANULOCYTES NFR BLD AUTO: 0.7 % (ref 0–0.5)
LYMPHOCYTES # BLD AUTO: 3.22 10*3/MM3 (ref 0.7–3.1)
LYMPHOCYTES NFR BLD AUTO: 31 % (ref 19.6–45.3)
MCH RBC QN AUTO: 29.3 PG (ref 26.6–33)
MCHC RBC AUTO-ENTMCNC: 33.6 G/DL (ref 31.5–35.7)
MCV RBC AUTO: 87.3 FL (ref 79–97)
MONOCYTES # BLD AUTO: 0.84 10*3/MM3 (ref 0.1–0.9)
MONOCYTES NFR BLD AUTO: 8.1 % (ref 5–12)
NEUTROPHILS NFR BLD AUTO: 5.97 10*3/MM3 (ref 1.7–7)
NEUTROPHILS NFR BLD AUTO: 57.5 % (ref 42.7–76)
NRBC BLD AUTO-RTO: 0 /100 WBC (ref 0–0.2)
PLATELET # BLD AUTO: 234 10*3/MM3 (ref 140–450)
PMV BLD AUTO: 9.5 FL (ref 6–12)
POTASSIUM SERPL-SCNC: 3.9 MMOL/L (ref 3.5–5.2)
PROT SERPL-MCNC: 6.3 G/DL (ref 6–8.5)
RBC # BLD AUTO: 4.5 10*6/MM3 (ref 4.14–5.8)
SODIUM SERPL-SCNC: 142 MMOL/L (ref 136–145)
T4 FREE SERPL-MCNC: 1.54 NG/DL (ref 0.93–1.7)
TSH SERPL DL<=0.05 MIU/L-ACNC: 0.38 UIU/ML (ref 0.27–4.2)
WBC NRBC COR # BLD: 10.38 10*3/MM3 (ref 3.4–10.8)

## 2023-02-03 PROCEDURE — 84443 ASSAY THYROID STIM HORMONE: CPT

## 2023-02-03 PROCEDURE — 84439 ASSAY OF FREE THYROXINE: CPT

## 2023-02-03 PROCEDURE — 85025 COMPLETE CBC W/AUTO DIFF WBC: CPT

## 2023-02-03 PROCEDURE — 36415 COLL VENOUS BLD VENIPUNCTURE: CPT

## 2023-02-03 PROCEDURE — 80053 COMPREHEN METABOLIC PANEL: CPT

## 2023-03-13 DIAGNOSIS — C34.31 MALIGNANT NEOPLASM OF LOWER LOBE OF RIGHT LUNG: Primary | ICD-10-CM

## 2023-04-14 ENCOUNTER — HOSPITAL ENCOUNTER (OUTPATIENT)
Dept: CT IMAGING | Facility: HOSPITAL | Age: 78
Discharge: HOME | End: 2023-04-14
Admitting: PHYSICIAN ASSISTANT
Payer: MEDICARE

## 2023-04-14 DIAGNOSIS — C34.31 MALIGNANT NEOPLASM OF LOWER LOBE OF RIGHT LUNG: ICD-10-CM

## 2023-04-14 PROCEDURE — 25510000001 IOPAMIDOL 61 % SOLUTION: Performed by: PHYSICIAN ASSISTANT

## 2023-04-14 PROCEDURE — 71270 CT THORAX DX C-/C+: CPT

## 2023-04-14 RX ADMIN — IOPAMIDOL 100 ML: 612 INJECTION, SOLUTION INTRAVENOUS at 13:58

## 2023-05-08 NOTE — PROGRESS NOTES
[facility] Cardiothoracic Surgery Office Follow Up Note     Date of Encounter: 2023     Name: Angi Marinelli  : 1945     Referred By: No ref. provider found  PCP: Georgiana Cortés DO    Chief Complaint:    Chief Complaint   Patient presents with   • Follow-up     AAA - s/p EVAR and lung cancer.  He is doing well.         Subjective      History of Present Illness:    Angi Marinelli is a 78 y.o. male current smoker with history of papillary thyroid carcinoma s/p partial thyroidectomy, COPD, HTN, HLD on statin therapy, AAA s/p EVAR in  by Dr. Cheung, and stage IB invasive moderately differentiated adenocarcinoma of the right lung s/p RUL lobectomy 2019 with Dr. Nance.  Surgical margins negative with vascular invasion identified ([id number]). Patient was last seen in clinic 5/3/2022 with CT chest demonstrating no recurrent or metastatic disease and a stable calcified RLL nodule.  He presents today with new LDCT chest.  US AAA will be completed biennially (next study ).     He has no complaints of chronic SOA/TRIPP which is unchanged since last OV.  He does suffer from chronic nonproductive cough, but no hemoptysis.  He denies any other constitutional symptoms. He reports good appetite with no weight loss.  He is followed by oncologist Dr. Bruno with [facility] for his hx of thyroid cancer - next Oncology Clinic scheduled for 2023.      Review of Systems:  Review of Systems   Constitutional: Negative. Negative for chills, decreased appetite, diaphoresis, fever, malaise/fatigue, night sweats, weight gain and weight loss.   HENT: Negative.  Negative for hoarse voice.    Eyes: Negative.  Negative for blurred vision, double vision and visual disturbance.   Cardiovascular: Negative.  Negative for chest pain, claudication, dyspnea on exertion, irregular heartbeat, leg swelling, near-syncope, orthopnea, palpitations, paroxysmal nocturnal dyspnea and syncope.   Respiratory:  Negative.  Negative for cough, hemoptysis, shortness of breath, sputum production and wheezing.    Hematologic/Lymphatic: Negative for adenopathy and bleeding problem. Does not bruise/bleed easily.   Skin: Negative.  Negative for color change, nail changes, poor wound healing and rash.   Musculoskeletal: Negative.  Negative for back pain, falls and muscle cramps.   Gastrointestinal: Negative.  Negative for abdominal pain, dysphagia and heartburn.   Genitourinary: Negative.  Negative for flank pain.   Neurological: Positive for paresthesias (bilateral hands). Negative for brief paralysis, disturbances in coordination, dizziness, focal weakness, headaches, light-headedness, loss of balance, numbness, sensory change, vertigo and weakness.   Psychiatric/Behavioral: Negative for depression and suicidal ideas. The patient has insomnia.    Allergic/Immunologic: Negative for persistent infections.       I have reviewed the following portions of the patient's history: allergies, current medications, past family history, past medical history, past social history, past surgical history, problem list and ROS and confirm it's accurate.    Allergies:  Allergies   Allergen Reactions   • Penicillins Anaphylaxis, Swelling and Rash   • Nicotine Rash     Patch reaction       Medications:      Current Outpatient Medications:   •  amLODIPine (NORVASC) 10 MG tablet, Take 1 tablet by mouth Daily., Disp: , Rfl:   •  aspirin 325 MG tablet, Take 1 tablet by mouth Daily., Disp: , Rfl:   •  clonazePAM (KLONOPIN) 0.5 MG tablet, Take 1 tablet by mouth Every Night., Disp: 30 tablet, Rfl: 3  •  docusate sodium (COLACE) 100 MG capsule, Take 1 capsule by mouth At Night As Needed for Constipation., Disp: , Rfl:   •  finasteride (PROSCAR) 5 MG tablet, Take 1 tablet by mouth Daily., Disp: , Rfl:   •  HYDROcodone-acetaminophen (NORCO) 5-325 MG per tablet, Take 1 tablet by mouth 2 (Two) Times a Day., Disp: 30 tablet, Rfl: 0  •  levothyroxine (SYNTHROID,  LEVOTHROID) 50 MCG tablet, Take 1.5 tablets by mouth Daily., Disp: , Rfl:   •  montelukast (SINGULAIR) 10 MG tablet, Take 1 tablet by mouth Every Night., Disp: , Rfl:   •  omeprazole (priLOSEC) 20 MG capsule, Take 1 capsule by mouth Daily., Disp: , Rfl:   •  Polyethylene Glycol 3350 (MIRALAX PO), Take 1 dose by mouth Daily., Disp: , Rfl:   •  rOPINIRole (REQUIP) 1 MG tablet, Take 1 tablet by mouth Every Night. Take 1 hour before bedtime., Disp: , Rfl:   •  sertraline (ZOLOFT) 25 MG tablet, Take 1 tablet by mouth Daily., Disp: , Rfl:   •  simvastatin (ZOCOR) 40 MG tablet, Take 1 tablet by mouth Every Evening., Disp: , Rfl: 2  •  tiotropium bromide monohydrate (SPIRIVA RESPIMAT) 2.5 MCG/ACT aerosol solution inhaler, Inhale 2 puffs Daily., Disp: 1 inhaler, Rfl: 5  •  topiramate (TOPAMAX) 50 MG tablet, Take 1 tablet by mouth 2 (Two) Times a Day., Disp: , Rfl:     History:   Past Medical History:   Diagnosis Date   • Abnormal CT lung screening    • Arthritis     hip    • COPD (chronic obstructive pulmonary disease)    • Full dentures    • GERD (gastroesophageal reflux disease)    • Heat stroke     x 4   • Hyperlipidemia    • Hypertension    • Lung cancer    • Migraine    • On home oxygen therapy     given 6-2019 but has not used   • Pneumonia 2019   • PONV (postoperative nausea and vomiting)    • Restless leg syndrome    • Skin cancer     right side of face removed    • Thyroid cancer    • Tobacco abuse    • Wears reading eyeglasses        Past Surgical History:   Procedure Laterality Date   • ARTERIAL ANEURYSM REPAIR  2008    AAA Repair by Dr. Omi Cheung   • BRONCHOSCOPY N/A 6/27/2019    Procedure: BRONCHOSCOPY WITH ENDOBRONCHIAL ULTRASOUND;  Surgeon: Iggy Beard MD;  Location: [facility] OR;  Service: Pulmonary   • BRONCHOSCOPY N/A 8/22/2019    Procedure: BRONCHOSCOPY;  Surgeon: Shabbir Nance MD;  Location: [facility] OR;  Service: Cardiothoracic   • CATARACT EXTRACTION Bilateral     Cataract extraction with IOL  "  • LYMPH NODE DISSECTION Right 8/22/2019    Procedure: MEDIASTINAL LYMPH NODE DISSECTION RIGHT;  Surgeon: Shabbir Nance MD;  Location:  [facility] OR;  Service: Cardiothoracic   • SKIN BIOPSY      right face   • THORACOSCOPY Right 8/22/2019    Procedure: THORACOSCOPY VIDEO ASSISTED WITH RIGHT UPPER LOBECTOMY, INTERCOSTAL NERVE BLOCKS WITH CRYOABLATION;  Surgeon: Shabbir Nance MD;  Location:  [facility] OR;  Service: Cardiothoracic   • THYROIDECTOMY, PARTIAL Right 11/13/2019    lymph nodes also       Social History     Socioeconomic History   • Marital status:    • Number of children: 5   Tobacco Use   • Smoking status: Every Day     Packs/day: 1.00     Years: 57.00     Pack years: 57.00     Types: Cigarettes   • Smokeless tobacco: Never   Vaping Use   • Vaping Use: Never used   Substance and Sexual Activity   • Alcohol use: No   • Drug use: No   • Sexual activity: Defer        Family History   Problem Relation Age of Onset   • Hypertension Mother    • Stroke Father        Objective   Physical Exam:  Vitals:    05/09/23 0909 05/09/23 0910   BP: 132/72 124/68   BP Location: Left arm Right arm   Patient Position: Sitting Sitting   Pulse: 71    Temp: 96.9 °F (36.1 °C)    SpO2: 98%    Weight: 81.4 kg (179 lb 6.4 oz)    Height: 182.9 cm (72.01\")  Comment: pt reported       Body mass index is 24.33 kg/m².    Physical Exam  Vitals reviewed.   Constitutional:       General: He is not in acute distress.     Appearance: He is not toxic-appearing.   HENT:      Head: Normocephalic and atraumatic.   Eyes:      General: Lids are normal.      Conjunctiva/sclera: Conjunctivae normal.      Pupils: Pupils are equal, round, and reactive to light.   Neck:      Vascular: No carotid bruit.   Cardiovascular:      Rate and Rhythm: Normal rate and regular rhythm.      Heart sounds: S1 normal and S2 normal. No murmur heard.  Pulmonary:      Effort: Pulmonary effort is normal. No respiratory distress.      Breath sounds: No decreased breath " sounds, wheezing, rhonchi or rales.   Musculoskeletal:         General: Normal range of motion.      Cervical back: Normal range of motion and neck supple.      Right lower leg: No edema.      Left lower leg: No edema.   Lymphadenopathy:      Cervical: No cervical adenopathy.      Upper Body:      Right upper body: No supraclavicular adenopathy.      Left upper body: No supraclavicular adenopathy.   Skin:     General: Skin is warm and dry.      Capillary Refill: Capillary refill takes less than 2 seconds.   Neurological:      General: No focal deficit present.      Mental Status: He is alert and oriented to person, place, and time.   Psychiatric:         Attention and Perception: Attention normal.         Mood and Affect: Mood normal.         Speech: Speech normal.         Behavior: Behavior normal. Behavior is cooperative.         Imaging/Labs:    CT Chest With & Without Contrast Diagnostic: 4/15/2023  Personally reviewed and agree w/ Radiology interpretation  Impression:   1. No evidence of recurrent or metastatic disease.  2. No acute intrathoracic abnormality.   3. Stable chronic findings as above.         Signed and finalized on 4/15/2023 2:29 PM by Dena Matta MD.     US aaa screen limited-Result Date: 4/29/2022  No evidence of an abdominal aneurysm.      This report was signed and finalized on 4/29/2022 10:08 AM by Shanel Hazel M.D..     CT Chest With & Without Contrast Diagnostic-Result Date: 4/29/2022  No evidence of metastatic disease within the chest.   This study was performed with techniques to keep radiation doses as low as reasonably achievable (ALARA). Individualized dose reduction techniques using automated exposure control or adjustment of mA and/or kV according to the patient size were employed.  This report was signed and finalized on 4/29/2022 9:49 AM by Shanel Hazel M.D..     CT Chest With & Without Contrast Diagnostic-Result Date: 12/15/2021  Emphysematous changes with no acute  cardiopulmonary process.     CT Chest With & Without Contrast Diagnostic-Result Date: 10/19/2021  No suspicious pulmonary mass or nodule. No evidence of recurrent or metastatic disease in the chest.     CT Abdomen Pelvis-4/14/2021  The patient is status post stent graft repair of an abdominal aortic aneurysm with the maximum AP dimension of the aorta measuring 3.2 cm. There is no evidence of an endoleak.  IMPRESSION: No acute intra-abdominal or pelvic process.     CT Chest With & Without Contrast Diagnostic-Result Date: 2/8/2021  Stable appearance of the chest with background emphysematous changes and postsurgical changes of the right lung. No new or enlarging suspicious pulmonary nodules.     CT Chest With & Without Contrast Diagnostic-Result Date: 7/28/2020  Postsurgical changes identified within the right lower lung field. Volume loss is present with no evidence of local recurrence or metastatic disease. Underlying chronic and emphysematous change is seen throughout the lung fields.     CT Lung Screening-5/23/2019  Lungs and airways: The central airways are patent. Emphysematous changes are noted. There is tree in bud nodular opacity of the right middle lobe, image 93 of series 2. A calcified granuloma is noted of the right lung base.   Nodules, right lung: At the right apex, there is a 2.6 x 2.1 cm spiculated nodule, image 19 of series 2. A 4 mm nodule is noted on the right along the major fissure, image 47 of series 2.   Nodules, left lung:   None.   Pleural: Normal.   Mediastinum: The heart is normal in size. No pericardial effusion. Thoracic aorta is normal in caliber. There is atherosclerotic calcification of the aortic arch and primary branches, including the coronaries.   Lymph nodes: No discrete abnormal thoracic lymphadenopathy, however evaluation is limited due to lack of intravenous contrast and technique. A subcarinal node is noted measuring up to 7 mm in short axis, image 57 of series 2. Additional  calcified   mediastinal hilar lymph nodes are noted.   Chest wall: Normal.   Bones: No suspicious lytic or blastic lesions.   Upper abdomen: Multiple hepatic hypodense lesions are noted which are incompletely assessed. For example, within the right hepatic lobe, there is a 2.8 cm hypodense lesion, image 113 of series 2, and in the left hepatic lobe, a 1.9 cm hypodense lesion,   image 110 of series 2. Additional lesions are noted.      Assessment / Plan      Assessment / Plan:  1. Infrarenal abdominal aortic aneurysm (AAA) without rupture (Primary)  2. RLL adenocarcinoma; s/p RUL lobectomy/mediastinal lymph node dissection (Dr. Nance, 8/22/19)  - 78 y.o. male current smoker with history of papillary thyroid carcinoma s/p partial thyroidectomy, COPD, HTN, HLD on statin therapy, AAA s/p EVAR in 2008 by Dr. Cheung, and stage IB invasive moderately differentiated adenocarcinoma of the right lung s/p RUL lobectomy 8/2019 with Dr. Nance.    - Surgical margins negative with vascular invasion identified ([id number]).  - Patient was last seen in clinic 5/3/2022 with CT chest demonstrating no recurrent or metastatic disease and a stable calcified RLL nodule.   - Current LDCT chest consistent with no recurrent or metastatic disease and stable calcified RLL nodule.  - US AAA will be completed biennially (next study 2024).   - Denies any constitutional symptoms.   - He is followed by oncologist Dr. Bruno with [facility] for his hx of thyroid cancer - next Oncology Clinic scheduled for 8/2023.    - Long discussion re: benefits of tobacco cessation  - RTC in one year with LDCT chest and US AAA    Patient Education: Angi Marinelli  reports that he has been smoking cigarettes. He has a 57.00 pack-year smoking history. He has never used smokeless tobacco.. I have educated him on the risk of diseases from using tobacco products such as cancer, COPD, heart disease and PVD.     I advised him to quit and he is not willing to  quit.  I spent 4 minutes counseling the patient.       Follow Up:   Return in about 1 year (around 5/9/2024) for LDCT chest + US AAA.   Or sooner for any further concerns or worsening sign and symptoms. If unable to reach us in the office please dial 911 or go to the nearest emergency department.      SHASHANK Vernon  [facility] Cardiothoracic Surgery    Time Spent: I spent 41 minutes caring for Angi on this date of service. This time includes time spent by me in the following activities: preparing for the visit, reviewing tests, obtaining and/or reviewing a separately obtained history, performing a medically appropriate examination and/or evaluation, counseling and educating the patient/family/caregiver, ordering medications, tests, or procedures, documenting information in the medical record, independently interpreting results and communicating that information with the patient/family/caregiver and care coordination.

## 2023-05-09 ENCOUNTER — OFFICE VISIT (OUTPATIENT)
Dept: CARDIAC SURGERY | Facility: CLINIC | Age: 78
End: 2023-05-09
Payer: MEDICARE

## 2023-05-09 VITALS
BODY MASS INDEX: 24.3 KG/M2 | SYSTOLIC BLOOD PRESSURE: 124 MMHG | TEMPERATURE: 96.9 F | HEIGHT: 72 IN | WEIGHT: 179.4 LBS | DIASTOLIC BLOOD PRESSURE: 68 MMHG | HEART RATE: 71 BPM | OXYGEN SATURATION: 98 %

## 2023-05-09 DIAGNOSIS — C34.31 MALIGNANT NEOPLASM OF LOWER LOBE OF RIGHT LUNG: ICD-10-CM

## 2023-05-09 DIAGNOSIS — I71.43 INFRARENAL ABDOMINAL AORTIC ANEURYSM (AAA) WITHOUT RUPTURE: Primary | ICD-10-CM

## 2023-08-11 ENCOUNTER — LAB (OUTPATIENT)
Dept: LAB | Facility: HOSPITAL | Age: 78
End: 2023-08-11
Payer: MEDICARE

## 2023-08-11 ENCOUNTER — TRANSCRIBE ORDERS (OUTPATIENT)
Dept: LAB | Facility: HOSPITAL | Age: 78
End: 2023-08-11
Payer: MEDICARE

## 2023-08-11 DIAGNOSIS — C34.90 MALIGNANT NEOPLASM OF UNSPECIFIED PART OF UNSPECIFIED BRONCHUS OR LUNG: ICD-10-CM

## 2023-08-11 DIAGNOSIS — C34.90 MALIGNANT NEOPLASM OF UNSPECIFIED PART OF UNSPECIFIED BRONCHUS OR LUNG: Primary | ICD-10-CM

## 2023-08-11 LAB
ALBUMIN SERPL-MCNC: 4.2 G/DL (ref 3.5–5.2)
ALBUMIN/GLOB SERPL: 1.6 G/DL
ALP SERPL-CCNC: 62 U/L (ref 39–117)
ALT SERPL W P-5'-P-CCNC: 9 U/L (ref 1–41)
ANION GAP SERPL CALCULATED.3IONS-SCNC: 12.5 MMOL/L (ref 5–15)
AST SERPL-CCNC: 13 U/L (ref 1–40)
BASOPHILS # BLD AUTO: 0.08 10*3/MM3 (ref 0–0.2)
BASOPHILS NFR BLD AUTO: 0.9 % (ref 0–1.5)
BILIRUB SERPL-MCNC: 0.3 MG/DL (ref 0–1.2)
BUN SERPL-MCNC: 18 MG/DL (ref 8–23)
BUN/CREAT SERPL: 14.4 (ref 7–25)
CALCIUM SPEC-SCNC: 9.2 MG/DL (ref 8.6–10.5)
CHLORIDE SERPL-SCNC: 109 MMOL/L (ref 98–107)
CO2 SERPL-SCNC: 21.5 MMOL/L (ref 22–29)
CREAT SERPL-MCNC: 1.25 MG/DL (ref 0.76–1.27)
DEPRECATED RDW RBC AUTO: 47.8 FL (ref 37–54)
EGFRCR SERPLBLD CKD-EPI 2021: 58.6 ML/MIN/1.73
EOSINOPHIL # BLD AUTO: 0.15 10*3/MM3 (ref 0–0.4)
EOSINOPHIL NFR BLD AUTO: 1.7 % (ref 0.3–6.2)
ERYTHROCYTE [DISTWIDTH] IN BLOOD BY AUTOMATED COUNT: 14.9 % (ref 12.3–15.4)
GLOBULIN UR ELPH-MCNC: 2.7 GM/DL
GLUCOSE SERPL-MCNC: 129 MG/DL (ref 65–99)
HCT VFR BLD AUTO: 42.6 % (ref 37.5–51)
HGB BLD-MCNC: 14.1 G/DL (ref 13–17.7)
IMM GRANULOCYTES # BLD AUTO: 0.03 10*3/MM3 (ref 0–0.05)
IMM GRANULOCYTES NFR BLD AUTO: 0.3 % (ref 0–0.5)
LYMPHOCYTES # BLD AUTO: 2.48 10*3/MM3 (ref 0.7–3.1)
LYMPHOCYTES NFR BLD AUTO: 27.8 % (ref 19.6–45.3)
MCH RBC QN AUTO: 29 PG (ref 26.6–33)
MCHC RBC AUTO-ENTMCNC: 33.1 G/DL (ref 31.5–35.7)
MCV RBC AUTO: 87.7 FL (ref 79–97)
MONOCYTES # BLD AUTO: 0.67 10*3/MM3 (ref 0.1–0.9)
MONOCYTES NFR BLD AUTO: 7.5 % (ref 5–12)
NEUTROPHILS NFR BLD AUTO: 5.52 10*3/MM3 (ref 1.7–7)
NEUTROPHILS NFR BLD AUTO: 61.8 % (ref 42.7–76)
NRBC BLD AUTO-RTO: 0 /100 WBC (ref 0–0.2)
PLATELET # BLD AUTO: 209 10*3/MM3 (ref 140–450)
PMV BLD AUTO: 10 FL (ref 6–12)
POTASSIUM SERPL-SCNC: 4 MMOL/L (ref 3.5–5.2)
PROT SERPL-MCNC: 6.9 G/DL (ref 6–8.5)
RBC # BLD AUTO: 4.86 10*6/MM3 (ref 4.14–5.8)
SODIUM SERPL-SCNC: 143 MMOL/L (ref 136–145)
T4 FREE SERPL-MCNC: 1.67 NG/DL (ref 0.93–1.7)
TSH SERPL DL<=0.05 MIU/L-ACNC: 0.27 UIU/ML (ref 0.27–4.2)
WBC NRBC COR # BLD: 8.93 10*3/MM3 (ref 3.4–10.8)

## 2023-08-11 PROCEDURE — 80053 COMPREHEN METABOLIC PANEL: CPT

## 2023-08-11 PROCEDURE — 85025 COMPLETE CBC W/AUTO DIFF WBC: CPT

## 2023-08-11 PROCEDURE — 84443 ASSAY THYROID STIM HORMONE: CPT

## 2023-08-11 PROCEDURE — 36415 COLL VENOUS BLD VENIPUNCTURE: CPT

## 2023-08-11 PROCEDURE — 84439 ASSAY OF FREE THYROXINE: CPT

## 2023-08-23 ENCOUNTER — TRANSCRIBE ORDERS (OUTPATIENT)
Dept: ADMINISTRATIVE | Facility: HOSPITAL | Age: 78
End: 2023-08-23
Payer: MEDICARE

## 2023-08-23 DIAGNOSIS — R63.4 ABNORMAL WEIGHT LOSS: Primary | ICD-10-CM

## 2023-08-23 DIAGNOSIS — Z85.118 PERSONAL HISTORY OF MALIGNANT NEOPLASM OF LUNG: ICD-10-CM

## 2023-09-07 ENCOUNTER — HOSPITAL ENCOUNTER (OUTPATIENT)
Dept: CT IMAGING | Facility: HOSPITAL | Age: 78
Discharge: HOME | End: 2023-09-07
Payer: MEDICARE

## 2023-09-07 DIAGNOSIS — R63.4 ABNORMAL WEIGHT LOSS: ICD-10-CM

## 2023-09-07 DIAGNOSIS — Z85.118 PERSONAL HISTORY OF MALIGNANT NEOPLASM OF LUNG: ICD-10-CM

## 2023-09-07 PROCEDURE — 74178 CT ABD&PLV WO CNTR FLWD CNTR: CPT

## 2023-09-07 PROCEDURE — 71270 CT THORAX DX C-/C+: CPT

## 2023-09-07 PROCEDURE — 25510000001 IOPAMIDOL 61 % SOLUTION: Performed by: FAMILY MEDICINE

## 2023-09-07 RX ADMIN — IOPAMIDOL 100 ML: 612 INJECTION, SOLUTION INTRAVENOUS at 15:05

## 2023-12-20 NOTE — PROGRESS NOTES
New Patient Office Visit      Encounter Date: 2023   Patient Name: Angi Marinelli  : 1945   MRN: [id number]   PCP: Georgiana Cortés DO    Referring Provider: Georgiana Cortés DO     Chief Complaint:  No chief complaint on file.      History of Present Illness: Angi Marinelli is a 78 y.o. 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  He was referred to us by his PCP Dr Georgiana Cortés after he was seen in [facility] on 10/5/23-family found him unresponsive.  Upon reviewing chart from OSH patient was altered for approximately 15 minutes before becoming fully alert and oriented.  Was discharged with diagnosis of TIA.   They attempted to transfer him to [facility] where neuro services were available but patient refused as he felt back to his baseline.  His initial NIHSS was 17 but improved to 0 before in a short time span.  He did not receive acute thrombolytics, CTAs did not show target lesion for thrombectomy.  At his PCP follow up he was started on dual antiplatelets, started on Plavix, was already on ASA 81 mg.    He had CTA head/neck performed on 10/5/2023 for her Stroke-like symptoms which were reported as not showing any large vessel stenosis or occlusion, no aneurysm or AVM although there was some atherosclerotic changes in bilateral intracranial ICAs.  Also mild to moderate atherosclerosis from plaque at bilateral carotid bifurcations.  Plain CT head was negative for any acute changes.  This is all imaging reports, actual images not available for my review.    Today he reports feeling completely back to his baseline, and has felt so since the day after his ED visit.  He denies any recurrence of symptoms.  No new stroke/TIA symptoms. He is compliant with his  medications.      Stroke Risk Factors: hyperlipidemia and hypertension, hx of Cancer      Subjective      Review of Systems:   Review of Systems   Constitutional:  Negative for activity change, appetite change, chills and fever.   HENT: Negative.     Eyes: Negative.    Respiratory: Negative.     Cardiovascular: Negative.    Musculoskeletal:  Positive for arthralgias.   Skin: Negative.    Neurological:  Positive for tremors. Negative for dizziness, seizures, facial asymmetry, speech difficulty, weakness, light-headedness and numbness.   Psychiatric/Behavioral: Negative.         Past Medical History:   Past Medical History:   Diagnosis Date    Abnormal CT lung screening     Arthritis     hip     COPD (chronic obstructive pulmonary disease)     Full dentures     GERD (gastroesophageal reflux disease)     Heat stroke     x 4    Hyperlipidemia     Hypertension     Lung cancer     Migraine     On home oxygen therapy     given 6-2019 but has not used    Pneumonia 2019    PONV (postoperative nausea and vomiting)     Restless leg syndrome     Skin cancer     right side of face removed     Thyroid cancer     Tobacco abuse     Wears reading eyeglasses        Past Surgical History:   Past Surgical History:   Procedure Laterality Date    ARTERIAL ANEURYSM REPAIR  2008    AAA Repair by Dr. Omi Cheung    BRONCHOSCOPY N/A 6/27/2019    Procedure: BRONCHOSCOPY WITH ENDOBRONCHIAL ULTRASOUND;  Surgeon: Iggy Beard MD;  Location:  [facility];  Service: Pulmonary    BRONCHOSCOPY N/A 8/22/2019    Procedure: BRONCHOSCOPY;  Surgeon: Shabbir Nance MD;  Location:  [facility];  Service: Cardiothoracic    CATARACT EXTRACTION Bilateral     Cataract extraction with IOL    LYMPH NODE DISSECTION Right 8/22/2019    Procedure: MEDIASTINAL LYMPH NODE DISSECTION RIGHT;  Surgeon: Shabbir Nance MD;  Location:  [facility];  Service: Cardiothoracic    SKIN BIOPSY      right face    THORACOSCOPY Right 8/22/2019    Procedure: THORACOSCOPY VIDEO  ASSISTED WITH RIGHT UPPER LOBECTOMY, INTERCOSTAL NERVE BLOCKS WITH CRYOABLATION;  Surgeon: Shabbir Nance MD;  [facility];  Service: Cardiothoracic    THYROIDECTOMY, PARTIAL Right 11/13/2019    lymph nodes also       Family History:   Family History   Problem Relation Age of Onset    Hypertension Mother     Stroke Father        Social History:   Social History     Socioeconomic History    Marital status:     Number of children: 5   Tobacco Use    Smoking status: Every Day     Packs/day: 1.00     Years: 57.00     Additional pack years: 0.00     Total pack years: 57.00     Types: Cigarettes    Smokeless tobacco: Never   Vaping Use    Vaping Use: Never used   Substance and Sexual Activity    Alcohol use: No    Drug use: No    Sexual activity: Defer       Medications:     Current Outpatient Medications:     amLODIPine (NORVASC) 10 MG tablet, Take 1 tablet by mouth Daily., Disp: , Rfl:     aspirin 325 MG tablet, Take 1 tablet by mouth Daily., Disp: , Rfl:     clonazePAM (KLONOPIN) 0.5 MG tablet, Take 1 tablet by mouth Every Night., Disp: 30 tablet, Rfl: 3    docusate sodium (COLACE) 100 MG capsule, Take 1 capsule by mouth At Night As Needed for Constipation., Disp: , Rfl:     finasteride (PROSCAR) 5 MG tablet, Take 1 tablet by mouth Daily., Disp: , Rfl:     HYDROcodone-acetaminophen (NORCO) 5-325 MG per tablet, Take 1 tablet by mouth 2 (Two) Times a Day., Disp: 30 tablet, Rfl: 0    levothyroxine (SYNTHROID, LEVOTHROID) 50 MCG tablet, Take 1.5 tablets by mouth Daily., Disp: , Rfl:     montelukast (SINGULAIR) 10 MG tablet, Take 1 tablet by mouth Every Night., Disp: , Rfl:     omeprazole (priLOSEC) 20 MG capsule, Take 1 capsule by mouth Daily., Disp: , Rfl:     Polyethylene Glycol 3350 (MIRALAX PO), Take 1 dose by mouth Daily., Disp: , Rfl:     rOPINIRole (REQUIP) 1 MG tablet, Take 1 tablet by mouth Every Night. Take 1 hour before bedtime., Disp: , Rfl:     sertraline (ZOLOFT) 25 MG tablet, Take 1 tablet by  mouth Daily., Disp: , Rfl:     simvastatin (ZOCOR) 40 MG tablet, Take 1 tablet by mouth Every Evening., Disp: , Rfl: 2    tiotropium bromide monohydrate (SPIRIVA RESPIMAT) 2.5 MCG/ACT aerosol solution inhaler, Inhale 2 puffs Daily., Disp: 1 inhaler, Rfl: 5    topiramate (TOPAMAX) 50 MG tablet, Take 1 tablet by mouth 2 (Two) Times a Day., Disp: , Rfl:     Allergies:   Allergies   Allergen Reactions    Penicillins Anaphylaxis, Swelling and Rash    Nicotine Rash     Patch reaction       Objective     Physical Exam:  Vital Signs: There were no vitals filed for this visit.  There is no height or weight on file to calculate BMI.     Physical Exam  Vitals reviewed.   Constitutional:       General: He is awake. He is not in acute distress.     Appearance: Normal appearance. He is not ill-appearing.   HENT:      Head: Normocephalic.      Mouth/Throat:      Mouth: Mucous membranes are moist.      Pharynx: Oropharynx is clear.   Eyes:      General: Lids are normal.      Pupils: Pupils are equal, round, and reactive to light.   Cardiovascular:      Rate and Rhythm: Normal rate.   Pulmonary:      Effort: Pulmonary effort is normal. No respiratory distress.   Skin:     General: Skin is warm and dry.   Neurological:      General: No focal deficit present.      Mental Status: He is alert.      Motor: Motor strength is normal.  Psychiatric:         Mood and Affect: Mood normal.         Speech: Speech normal.         Behavior: Behavior normal.       Neurological Exam  Mental Status  Awake and alert. Oriented to person, place and time. Recent and remote memory are intact. Speech is normal. Language is fluent with no aphasia. Attention and concentration are normal. Fund of knowledge is appropriate for level of education.    Cranial Nerves  CN II: Visual fields full to confrontation.  CN III, IV, VI: Normal lids and orbits bilaterally. Pupils equal round and reactive to light bilaterally.  CN V: Facial sensation is normal.  CN VII: Full  and symmetric facial movement.  CN IX, X: Palate elevates symmetrically  CN XI: Shoulder shrug strength is normal.  CN XII: Tongue midline without atrophy or fasciculations.    Motor  Decreased muscle bulk throughout. Normal muscle tone. No abnormal involuntary movements. Strength is 5/5 throughout all four extremities.    Sensory  Light touch is normal in upper and lower extremities.     Coordination  Right: Finger-to-nose normal.Left: Finger-to-nose normal.    Gait  Casual gait is normal including stance, stride, and arm swing.       NIH Stroke Scale    1a  Level of consciousness: 0=alert; keenly responsive   1b. LOC questions:  0=Answers both questions correctly    1c. LOC commands: 0=Performs both tasks correctly   2.  Best Gaze: 0=normal   3. Visual: 0=No visual loss   4. Facial Palsy: 0=Normal symmetric movement   5a. Motor left arm: 0=No drift, limb holds 90 (or 45) degrees for full 10 seconds   5b.  Motor right arm: 0=No drift, limb holds 90 (or 45) degrees for full 10 seconds   6a. Motor left le=No drift, limb holds 90 (or 45) degrees for full 10 seconds   6b  Motor right le=No drift, limb holds 90 (or 45) degrees for full 10 seconds   7. Limb Ataxia: 0=Absent   8.  Sensory: 0=Normal; no sensory loss   9. Best Language:  0=No aphasia, normal   10. Dysarthria: 0=Normal   11. Extinction and Inattention: 0=No abnormality    Total:   0         Modified [place] Score: 0        0  No Symptoms    1 No significant disability. Able to carry out all usual activities, despite some symptoms.    2 Slight disability. Able to look after own affairs without assistance, but unable to carry out all previous activities.    3 Moderate disability. Requires some help, but able to walk unassisted.    4 Moderately severe disability. Unable to attend to own bodily needs without assistance, and unable to walk unassisted.    5 Severe disability. Requires constant nursing care and attention, bedridden, incontinent.    6 Dead       PHQ-9 Depression Screening  Little interest or pleasure in doing things?     Feeling down, depressed, or hopeless?     Trouble falling or staying asleep, or sleeping too much?     Feeling tired or having little energy?     Poor appetite or overeating?     Feeling bad about yourself - or that you are a failure or have let yourself or your family down?     Trouble concentrating on things, such as reading the newspaper or watching television?     Moving or speaking so slowly that other people could have noticed? Or the opposite - being so fidgety or restless that you have been moving around a lot more than usual?     Thoughts that you would be better off dead, or of hurting yourself in some way?     PHQ-9 Total Score     If you checked off any problems, how difficult have these problems made it for you to do your work, take care of things at home, or get along with other people?        Imaging Reviewed:       CT Abdomen Pelvis With & Without Contrast, CT Chest With & Without Contrast Diagnostic  Narrative: CT OF THE CHEST, ABDOMEN AND PELVIS WITHOUT AND WITH CONTRAST     INDICATION: SAME; R63.4-Abnormal weight loss; Z85.118-Personal history  of other malignant neoplasm of bronchus and lung     TECHNIQUE:  Thin section axial images were obtained from the lung apices  to the pubic symphysis before and after intravenous contrast  administration. Multiplanar reconstruction images were obtained from the  axial data.      COMPARISON: CT chest dated 04/14/2023 and a prior CT abdomen and pelvis  dated 04/14/2021.     FINDINGS:      CHEST:  There is no mediastinal, hilar, or axillary lymphadenopathy.  There is no pleural or pericardial effusion. A 6 mm medial left lower  lobe nodule on image 55 is unchanged. Postoperative changes to the right  lung are again noted. There is emphysema and evidence of prior  granulomatous disease. There are no new nodules or masses and there is  no consolidation.  There is no pleural or  pericardial effusion.      ABDOMEN: Hypodense liver lesions are stable and favored to represent  hepatic cysts.  The gallbladder is present. The spleen and pancreas are  without acute abnormality. Left adrenal enlargement is unchanged and  likely related to hyperplasia. Bilateral hypodense renal lesions are  stable. No hydronephrosis or convincing solid renal mass. The abdominal  portions of the GI tract reveal no evidence of small bowel obstruction  or focal small bowel wall thickening. There are postoperative changes  from endoluminal graft repair of an abdominal aortic aneurysm. The graft  is patent. No abdominal ascites or lymphadenopathy.     PELVIS: The prostate and urinary bladder are unremarkable. The appendix  is normal. There is a large amount of retained stool consistent with  constipation. No pelvic lymphadenopathy or free fluid.       BONES: No acute osseous abnormality in the chest, abdomen, or pelvis.     Impression: 1. No evidence of metastatic disease to the chest, abdomen, or pelvis.  2. Left lower lobe pulmonary nodule, stable for 2 years and presumed  benign.  3. Constipation.  4. Hepatic and renal cysts.                                   This study was performed with techniques to keep radiation doses as low  as reasonably achievable (ALARA). Individualized dose reduction  techniques using automated exposure control or adjustment of mA and/or  kV according to the patient size were employed.            This report was signed and finalized on 9/8/2023 8:24 AM by Dena Matta MD.       No radiology results for the last 90 days.   Laboratory Results:    Lab Results   Component Value Date    HGBA1C 6.10 (H) 08/21/2019     Lab Results   Component Value Date    WBC 8.93 08/11/2023    HGB 14.1 08/11/2023    HCT 42.6 08/11/2023    MCV 87.7 08/11/2023     08/11/2023      Lab Results   Component Value Date    GLUCOSE 129 (H) 08/11/2023    BUN 18 08/11/2023    CREATININE 1.25 08/11/2023     "EGFRIFNONA 63 02/10/2022    BCR 14.4 08/11/2023    K 4.0 08/11/2023    CO2 21.5 (L) 08/11/2023    CALCIUM 9.2 08/11/2023    ALBUMIN 4.2 08/11/2023    AST 13 08/11/2023    ALT 9 08/11/2023      No results found for: \"CHOL\", \"CHLPL\"  No results found for: \"TRIG\"  No results found for: \"HDL\"  No results found for: \"LDL\", \"LDLDIRECT\"   Lab Results   Component Value Date    TSH 0.267 (L) 08/11/2023     No results found for: \"FOLATE\"  No results found for: \"CGIFBDAP38\"            Assessment / Plan      Assessment/Plan:   Diagnoses and all orders for this visit:    1. Personal history of TIA (transient ischemic attack) (Primary)    2. Primary hypertension    3. Mixed hyperlipidemia       -Possible posterior circulation TIA vs syncopal episode in October.  Vessel imaging without LVO, no hemodynamically significant stenosis.   Reports feeling light headed and had syncopal episode, woke up in ED but felt back to himself. No recurrence of symptoms.   -Continue ASA 81 mg daily, okay to stop Plavix.  He has had DAPT for over 2 months now  -Will switch simvastatin to atorvastatin 40 mg daily and check LDL  -BP today 147/84, reports typically less than 140 at home  -Patient continues to smoke, he is aware of the significant health risks and wants to quit, I encouraged him to do so  -MRI brain     Discussed the importance of medication compliance and lifestyle modifications (adequate blood pressure control, adequate control of hyperlipidemia, adequate glycemic control, increase physical activity, and healthy diet) to help reduce the risk of future cerebrovascular events.  Also discussed the signs symptoms that would warrant the patient return back to the emergency department including unilateral weakness, unilateral numbness, visual disturbances, loss of balance, speech difficulties, and/or a sudden severe headache.     Blood Pressure control to < 130/80  Goal LDL <70  Serum Glucose < 140  Call 911 for any stroke symptoms    Follow " Up:   No follow-ups on file.    Roman Saunders, ALDO-BC  [facility]     This document has been electronically signed by SHASHANK Lewis  December 20, 2023 14:31 EST    Please note that portions of this note were completed with a voice recognition program. Efforts were made to edit dictation, but occasionally words are mistranscribed.

## 2023-12-22 ENCOUNTER — OFFICE VISIT (OUTPATIENT)
Dept: NEUROLOGY | Facility: CLINIC | Age: 78
End: 2023-12-22
Payer: MEDICARE

## 2023-12-22 VITALS
BODY MASS INDEX: 23.81 KG/M2 | DIASTOLIC BLOOD PRESSURE: 84 MMHG | SYSTOLIC BLOOD PRESSURE: 147 MMHG | WEIGHT: 175.6 LBS | TEMPERATURE: 97.3 F | OXYGEN SATURATION: 97 % | HEART RATE: 104 BPM

## 2023-12-22 DIAGNOSIS — Z86.73 PERSONAL HISTORY OF TIA (TRANSIENT ISCHEMIC ATTACK): Primary | ICD-10-CM

## 2023-12-22 DIAGNOSIS — I10 PRIMARY HYPERTENSION: ICD-10-CM

## 2023-12-22 DIAGNOSIS — E78.2 MIXED HYPERLIPIDEMIA: ICD-10-CM

## 2023-12-22 PROCEDURE — 80061 LIPID PANEL: CPT | Performed by: NURSE PRACTITIONER

## 2023-12-22 RX ORDER — AMLODIPINE BESYLATE 10 MG/1
10 TABLET ORAL DAILY
COMMUNITY

## 2023-12-22 RX ORDER — ASPIRIN 81 MG/1
81 TABLET, CHEWABLE ORAL DAILY
COMMUNITY

## 2023-12-22 RX ORDER — ATORVASTATIN CALCIUM 40 MG/1
40 TABLET, FILM COATED ORAL DAILY
Qty: 90 TABLET | Refills: 3 | Status: SHIPPED | OUTPATIENT
Start: 2023-12-22 | End: 2024-12-21

## 2023-12-22 RX ORDER — CLOPIDOGREL BISULFATE 75 MG/1
75 TABLET ORAL DAILY
COMMUNITY
End: 2023-12-22

## 2023-12-22 RX ORDER — LEVALBUTEROL TARTRATE 45 UG/1
1-2 AEROSOL, METERED ORAL EVERY 4 HOURS PRN
COMMUNITY

## 2023-12-22 NOTE — PATIENT INSTRUCTIONS
Stop Plavix  Continue aspirin 81mg daily  Stop simvastatin  Start atorvastatin  MRI brain will be scheduled  LDL today

## 2023-12-23 LAB
CHOLEST SERPL-MCNC: 139 MG/DL (ref 0–200)
HDLC SERPL-MCNC: 45 MG/DL (ref 40–60)
LDLC SERPL CALC-MCNC: 75 MG/DL (ref 0–100)
LDLC/HDLC SERPL: 1.64 {RATIO}
TRIGL SERPL-MCNC: 101 MG/DL (ref 0–150)
VLDLC SERPL-MCNC: 19 MG/DL (ref 5–40)

## 2024-02-29 ENCOUNTER — HOSPITAL ENCOUNTER (OUTPATIENT)
Dept: MRI IMAGING | Facility: HOSPITAL | Age: 79
Discharge: HOME | End: 2024-02-29
Admitting: NURSE PRACTITIONER
Payer: MEDICARE

## 2024-02-29 DIAGNOSIS — Z86.73 PERSONAL HISTORY OF TIA (TRANSIENT ISCHEMIC ATTACK): ICD-10-CM

## 2024-02-29 PROCEDURE — 70551 MRI BRAIN STEM W/O DYE: CPT

## 2024-02-29 PROCEDURE — 70551 MRI BRAIN STEM W/O DYE: CPT | Performed by: RADIOLOGY

## 2024-03-01 NOTE — PROGRESS NOTES
Pt is informed and he would like to know if you can go ahead and send that MRI to his pcp Georgiana Cortés at [facility]

## 2024-03-28 NOTE — PROGRESS NOTES
Follow Up Office Visit      Encounter Date: 2024   Patient Name: Angi Marinelli  : 1945   MRN: [id number]   PCP: Georgiana Cortés DO    Chief Complaint:    Chief Complaint   Patient presents with    Follow-up     Personal history of TIA (transient ischemic attack       History of Present Illness: Angi Marinelli is a 78 y.o. 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  He was referred to us by his PCP Dr Georgiana Cortés after he was seen in [facility] ED on 10/5/23-family found him unresponsive.  Upon reviewing chart from OSH patient was altered for approximately 15 minutes before becoming fully alert and oriented.  Was discharged with diagnosis of TIA.   They attempted to transfer him to [facility] where neuro services were available but patient refused as he felt back to his baseline.  His initial NIHSS was 17 but improved to 0 before in a short time span.  He did not receive acute thrombolytics, CTAs did not show target lesion for thrombectomy.  At his PCP follow up he was started on dual antiplatelets, started on Plavix, was already on ASA 81 mg.     He had CTA head/neck performed on 10/5/2023 for her Stroke-like symptoms which were reported as not showing any large vessel stenosis or occlusion, no aneurysm or AVM although there was some atherosclerotic changes in bilateral intracranial ICAs.  Also mild to moderate atherosclerosis from plaque at bilateral carotid bifurcations.  Plain CT head was negative for any acute changes.  This is all imaging reports, actual images not available for my review.     OV 2023: Today he reports feeling completely back to his baseline, and has felt so since the day after his ED visit.  He denies any recurrence of symptoms.  No  new stroke/TIA symptoms. He is compliant with his medications.      OV 03/29/2024: He denies any new stroke/TIA symptoms.  He is compliant with his medication regimen, no issues with cost.  He remains at his neurological baseline.  Reports he is overall doing well, no more syncopal events.  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      Subjective        I have reviewed and the following portions of the patient's history were updated as appropriate: past family history, past medical history, past social history, past surgical history and problem list.    Medications:     Current Outpatient Medications:     amLODIPine (NORVASC) 10 MG tablet, Take 1 tablet by mouth Daily., Disp: , Rfl:     aspirin 81 MG chewable tablet, Chew 1 tablet Daily., Disp: , Rfl:     atorvastatin (Lipitor) 40 MG tablet, Take 1 tablet by mouth Daily., Disp: 90 tablet, Rfl: 3    clonazePAM (KLONOPIN) 0.5 MG tablet, Take 1 tablet by mouth Every Night., Disp: 30 tablet, Rfl: 3    HYDROcodone-acetaminophen (NORCO) 5-325 MG per tablet, Take 1 tablet by mouth 2 (Two) Times a Day., Disp: 30 tablet, Rfl: 0    levalbuterol (XOPENEX HFA) 45 MCG/ACT inhaler, Inhale 1-2 puffs Every 4 (Four) Hours As Needed for Wheezing., Disp: , Rfl:     levothyroxine (SYNTHROID, LEVOTHROID) 50 MCG tablet, Take 1.5 tablets by mouth Daily., Disp: , Rfl:     montelukast (SINGULAIR) 10 MG tablet, Take 1 tablet by mouth Every Night., Disp: , Rfl:     omeprazole (priLOSEC) 20 MG capsule, Take 1 capsule by mouth Daily., Disp: , Rfl:     tiotropium bromide monohydrate (SPIRIVA RESPIMAT) 2.5 MCG/ACT aerosol solution inhaler, Inhale 2 puffs Daily., Disp: 1 inhaler, Rfl: 5    topiramate (TOPAMAX) 50 MG tablet, Take 1 tablet by mouth 2 (Two) Times a Day., Disp: , Rfl:     Allergies:   Allergies   Allergen Reactions    Penicillins Anaphylaxis, Swelling and Rash    Nicotine Rash     Patch reaction       Review of Systems   Constitutional:  Negative for chills  "and fever.   Respiratory: Negative.     Cardiovascular: Negative.    Musculoskeletal:  Positive for arthralgias.   Skin: Negative.    Neurological:  Positive for tremors. Negative for syncope, facial asymmetry, speech difficulty and weakness.   Psychiatric/Behavioral: Negative.          Objective     Physical Exam:   Vital Signs:   Vitals:    03/29/24 1254   BP: 132/77   BP Location: Right arm   Patient Position: Sitting   Cuff Size: Small Adult   Pulse: 77   SpO2: 98%   Weight: 78.9 kg (174 lb)   Height: 182.9 cm (72.01\")     Body mass index is 23.59 kg/m².    Physical Exam  Vitals reviewed.   Constitutional:       General: He is awake. He is not in acute distress.     Appearance: Normal appearance. He is not ill-appearing.   HENT:      Head: Normocephalic.      Mouth/Throat:      Mouth: Mucous membranes are moist.      Pharynx: Oropharynx is clear.   Eyes:      General: Lids are normal.      Pupils: Pupils are equal, round, and reactive to light.   Cardiovascular:      Rate and Rhythm: Normal rate.   Pulmonary:      Effort: Pulmonary effort is normal. No respiratory distress.   Skin:     General: Skin is warm and dry.   Neurological:      General: No focal deficit present.      Mental Status: He is alert.      Motor: Motor strength is normal.  Psychiatric:         Mood and Affect: Mood normal.         Speech: Speech normal.         Behavior: Behavior normal.       Neurological Exam  Mental Status  Awake and alert. Oriented to person, place and time. Recent and remote memory are intact. Speech is normal. Language is fluent with no aphasia. Attention and concentration are normal. Fund of knowledge is appropriate for level of education.    Cranial Nerves  CN II: Visual fields full to confrontation.  CN III, IV, VI: Normal lids and orbits bilaterally. Pupils equal round and reactive to light bilaterally.  CN V: Facial sensation is normal.  CN VII: Full and symmetric facial movement.  CN IX, X: Palate elevates " symmetrically  CN XI: Shoulder shrug strength is normal.  CN XII: Tongue midline without atrophy or fasciculations.    Motor  Decreased muscle bulk throughout. Normal muscle tone. No abnormal involuntary movements. High freq low amp tremor in bilateral upper ext with intention.   Strength is 5/5 throughout all four extremities.    Sensory  Light touch is normal in upper and lower extremities.     Coordination  Right: Finger-to-nose normal.Left: Finger-to-nose normal.    Gait  Casual gait is normal including stance, stride, and arm swing.       Procedures    NIH Stroke Scale    1a  Level of consciousness: 0=alert; keenly responsive   1b. LOC questions:  0=Answers both questions correctly    1c. LOC commands: 0=Performs both tasks correctly   2.  Best Gaze: 0=normal   3. Visual: 0=No visual loss   4. Facial Palsy: 0=Normal symmetric movement   5a. Motor left arm: 0=No drift, limb holds 90 (or 45) degrees for full 10 seconds   5b.  Motor right arm: 0=No drift, limb holds 90 (or 45) degrees for full 10 seconds   6a. Motor left le=No drift, limb holds 90 (or 45) degrees for full 10 seconds   6b  Motor right le=No drift, limb holds 90 (or 45) degrees for full 10 seconds   7. Limb Ataxia: 0=Absent   8.  Sensory: 0=Normal; no sensory loss   9. Best Language:  0=No aphasia, normal   10. Dysarthria: 0=Normal   11. Extinction and Inattention: 0=No abnormality    Total:   0         Modified [place] Scale: 0          0  No Symptoms    1 No significant disability. Able to carry out all usual activities, despite some symptoms.    2 Slight disability. Able to look after own affairs without assistance, but unable to carry out all previous activities.    3 Moderate disability. Requires some help, but able to walk unassisted.    4 Moderately severe disability. Unable to attend to own bodily needs without assistance, and unable to walk unassisted.    5 Severe disability. Requires constant nursing care and attention, bedridden,  incontinent.    6 Dead          PHQ-9 Depression Screening  Little interest or pleasure in doing things? 0-->not at all   Feeling down, depressed, or hopeless? 0-->not at all   Trouble falling or staying asleep, or sleeping too much?     Feeling tired or having little energy?     Poor appetite or overeating?     Feeling bad about yourself - or that you are a failure or have let yourself or your family down?     Trouble concentrating on things, such as reading the newspaper or watching television?     Moving or speaking so slowly that other people could have noticed? Or the opposite - being so fidgety or restless that you have been moving around a lot more than usual?     Thoughts that you would be better off dead, or of hurting yourself in some way?     PHQ-9 Total Score 0   If you checked off any problems, how difficult have these problems made it for you to do your work, take care of things at home, or get along with other people?        Imaging Resluts:     MRI Brain Without Contrast    Result Date: 2/29/2024   1. No evidence of an acute ischemic event 2. No parenchymal mass, hemorrhage, or midline shift 3. Increased signal in the mastoid air cells bilaterally 4. Subcutaneous cyst in the right cerebellar region  This report was finalized on 2/29/2024 2:29 PM by Dr. Fito Denise MD.        Laboratory Results:    Lab Results   Component Value Date    HGBA1C 6.10 (H) 08/21/2019     Lab Results   Component Value Date    WBC 8.93 08/11/2023    HGB 14.1 08/11/2023    HCT 42.6 08/11/2023    MCV 87.7 08/11/2023     08/11/2023      Lab Results   Component Value Date    GLUCOSE 129 (H) 08/11/2023    BUN 18 08/11/2023    CREATININE 1.25 08/11/2023    EGFRIFNONA 63 02/10/2022    BCR 14.4 08/11/2023    K 4.0 08/11/2023    CO2 21.5 (L) 08/11/2023    CALCIUM 9.2 08/11/2023    ALBUMIN 4.2 08/11/2023    AST 13 08/11/2023    ALT 9 08/11/2023      Lab Results   Component Value Date    CHOL 139 12/22/2023     Lab Results  "  Component Value Date    TRIG 101 12/22/2023     Lab Results   Component Value Date    HDL 45 12/22/2023     Lab Results   Component Value Date    LDL 75 12/22/2023      No results found for: \"GANOJSQX66\"  No results found for: \"FOLATE\"  Lab Results   Component Value Date    TSH 0.267 (L) 08/11/2023       Assessment / Plan      Assessment/Plan:   Diagnoses and all orders for this visit:    1. Personal history of TIA (transient ischemic attack) (Primary)    2. Mixed hyperlipidemia    3. Primary hypertension       -Possible posterior circulation TIA vs syncopal episode in October.  Vessel imaging without LVO, no hemodynamically significant stenosis.   Reports feeling light headed and had syncopal episode, woke up in ED but felt back to himself. No recurrence of symptoms.   -Continue ASA 81 mg daily  -Continue atorvastatin 40 mg daily  -BP today 132/85, similar readings at home  -Has cut back smoking to 1/2 ppd, encouraged him to quit completely  -MRI brain was negative for any recent ischemia, some chronic small vessel disease and T2 flair, no large areas of encephalomalacia.  It did show a subcutaneous cyst in right cerebellar region and patient was advised to follow-up with his PCP.  He tells me he has some swelling in that area that comes up occasionally, and he can typically get it to drain    Discussed the importance of medication compliance and lifestyle modifications (adequate blood pressure control, adequate control of hyperlipidemia, adequate glycemic control, increase physical activity, and healthy diet) to help reduce the risk of future cerebrovascular events.  Also discussed the signs symptoms that would warrant the patient return back to the emergency department including unilateral weakness, unilateral numbness, visual disturbances, loss of balance, speech difficulties, and/or a sudden severe headache.      Blood Pressure control to < 130/80  Goal LDL <70-recommend high dose statins  Goal A1c < " 7.0  Call 911 for any stroke symptoms    Follow Up:   Return if symptoms worsen or fail to improve.      [facility]    This document has been electronically signed by SHASHANK Lewis  March 29, 2024 13:17 EDT    Please note that portions of this note were completed with a voice recognition program. Efforts were made to edit dictation, but occasionally words are mistranscribed.

## 2024-03-29 ENCOUNTER — OFFICE VISIT (OUTPATIENT)
Dept: NEUROLOGY | Facility: CLINIC | Age: 79
End: 2024-03-29
Payer: MEDICARE

## 2024-03-29 VITALS
OXYGEN SATURATION: 98 % | DIASTOLIC BLOOD PRESSURE: 77 MMHG | BODY MASS INDEX: 23.57 KG/M2 | WEIGHT: 174 LBS | HEIGHT: 72 IN | HEART RATE: 77 BPM | SYSTOLIC BLOOD PRESSURE: 132 MMHG

## 2024-03-29 DIAGNOSIS — I10 PRIMARY HYPERTENSION: ICD-10-CM

## 2024-03-29 DIAGNOSIS — C34.31 MALIGNANT NEOPLASM OF LOWER LOBE OF RIGHT LUNG: Primary | ICD-10-CM

## 2024-03-29 DIAGNOSIS — E78.2 MIXED HYPERLIPIDEMIA: ICD-10-CM

## 2024-03-29 DIAGNOSIS — I71.43 INFRARENAL ABDOMINAL AORTIC ANEURYSM (AAA) WITHOUT RUPTURE: ICD-10-CM

## 2024-03-29 DIAGNOSIS — Z86.73 PERSONAL HISTORY OF TIA (TRANSIENT ISCHEMIC ATTACK): Primary | ICD-10-CM

## 2024-04-30 ENCOUNTER — HOSPITAL ENCOUNTER (OUTPATIENT)
Dept: ULTRASOUND IMAGING | Facility: HOSPITAL | Age: 79
Discharge: HOME | End: 2024-04-30
Payer: MEDICARE

## 2024-04-30 ENCOUNTER — HOSPITAL ENCOUNTER (OUTPATIENT)
Dept: CT IMAGING | Facility: HOSPITAL | Age: 79
Discharge: HOME | End: 2024-04-30
Payer: MEDICARE

## 2024-04-30 DIAGNOSIS — I71.43 INFRARENAL ABDOMINAL AORTIC ANEURYSM (AAA) WITHOUT RUPTURE: ICD-10-CM

## 2024-04-30 DIAGNOSIS — C34.31 MALIGNANT NEOPLASM OF LOWER LOBE OF RIGHT LUNG: ICD-10-CM

## 2024-04-30 PROCEDURE — 76775 US EXAM ABDO BACK WALL LIM: CPT

## 2024-04-30 PROCEDURE — 71270 CT THORAX DX C-/C+: CPT

## 2024-04-30 PROCEDURE — 25510000001 IOPAMIDOL 61 % SOLUTION: Performed by: NURSE PRACTITIONER

## 2024-04-30 RX ADMIN — IOPAMIDOL 100 ML: 612 INJECTION, SOLUTION INTRAVENOUS at 13:21

## 2024-05-02 ENCOUNTER — TELEPHONE (OUTPATIENT)
Dept: CARDIAC SURGERY | Facility: CLINIC | Age: 79
End: 2024-05-02
Payer: MEDICARE

## 2024-05-02 DIAGNOSIS — I71.43 INFRARENAL ABDOMINAL AORTIC ANEURYSM (AAA) WITHOUT RUPTURE: Primary | ICD-10-CM

## 2024-05-02 NOTE — TELEPHONE ENCOUNTER
Per Marilyn Elizabeth, APRN: according to US tech, US aorta was difficult due to bowel gas and patient body habitus and she is recommending CTA abd/pel for better view of endograft and aorta. Cancel follow up until after CTA has been scheduled.     Called patient's daughter/POA, Laura, and relayed info. I advised that she call us back with any questions or concerns. She verbalized understanding and agreed.     Laura would like scan to be done at [facility] in [place] and also be contacted once it is scheduled.

## 2024-05-13 ENCOUNTER — HOSPITAL ENCOUNTER (OUTPATIENT)
Dept: CT IMAGING | Facility: HOSPITAL | Age: 79
Discharge: HOME | End: 2024-05-13
Admitting: NURSE PRACTITIONER
Payer: MEDICARE

## 2024-05-13 DIAGNOSIS — I71.43 INFRARENAL ABDOMINAL AORTIC ANEURYSM (AAA) WITHOUT RUPTURE: ICD-10-CM

## 2024-05-13 LAB — CREAT BLDA-MCNC: 1.3 MG/DL (ref 0.6–1.3)

## 2024-05-13 PROCEDURE — 74174 CTA ABD&PLVS W/CONTRAST: CPT

## 2024-05-13 PROCEDURE — 25510000001 IOPAMIDOL PER 1 ML: Performed by: NURSE PRACTITIONER

## 2024-05-13 PROCEDURE — 82565 ASSAY OF CREATININE: CPT

## 2024-05-13 RX ADMIN — IOPAMIDOL 95 ML: 755 INJECTION, SOLUTION INTRAVENOUS at 14:19

## 2024-05-23 NOTE — PROGRESS NOTES
[facility] Cardiothoracic Surgery Office Follow Up Note     Date of Encounter: 2024     Name: Angi Marinelli  : 1945     Referred By: No ref. provider found  PCP: Georgiana Cortés DO    Chief Complaint:    No chief complaint on file.      Subjective      History of Present Illness:    Angi Marinelli is a 79 y.o. male current smoker with history of papillary thyroid carcinoma s/p partial thyroidectomy, COPD, HTN, HLD on statin therapy, AAA s/p EVAR in  by Dr. Cheung, and stage IB invasive moderately differentiated adenocarcinoma of the right lung s/p RUL lobectomy 2019 with Dr. Nance.  Surgical margins negative with vascular invasion identified ([id number]). Patient was last seen in clinic 23  with CT chest demonstrating no recurrent or metastatic disease and a stable calcified RLL nodule.  He presents today with new LDCT chest.  US AAA demonstrated enlargement of abdominal aorta. A follow up CTA was obtained.       He has no complaints of chronic SOA/TRIPP which is unchanged since last OV.  He does suffer from chronic nonproductive cough, but no hemoptysis.  He denies any other constitutional symptoms. He reports good appetite with no weight loss.  He is followed by oncologist Dr. Bruno with [facility] for his hx of thyroid cancer.      Review of Systems:  ROS    I have reviewed the following portions of the patient's history: problem list, current medications, allergies, past surgical history, past medical history, past social history, past family history, and ROS and confirm it's accurate.    Allergies:  Allergies   Allergen Reactions    Penicillins Anaphylaxis, Swelling and Rash    Nicotine Rash     Patch reaction       Medications:      Current Outpatient Medications:     amLODIPine (NORVASC) 10 MG tablet, Take 1 tablet by mouth Daily., Disp: , Rfl:     aspirin 81 MG chewable tablet, Chew 1 tablet Daily., Disp: , Rfl:     atorvastatin (Lipitor) 40 MG tablet, Take 1  tablet by mouth Daily., Disp: 90 tablet, Rfl: 3    clonazePAM (KLONOPIN) 0.5 MG tablet, Take 1 tablet by mouth Every Night., Disp: 30 tablet, Rfl: 3    HYDROcodone-acetaminophen (NORCO) 5-325 MG per tablet, Take 1 tablet by mouth 2 (Two) Times a Day., Disp: 30 tablet, Rfl: 0    levalbuterol (XOPENEX HFA) 45 MCG/ACT inhaler, Inhale 1-2 puffs Every 4 (Four) Hours As Needed for Wheezing., Disp: , Rfl:     levothyroxine (SYNTHROID, LEVOTHROID) 50 MCG tablet, Take 1.5 tablets by mouth Daily., Disp: , Rfl:     montelukast (SINGULAIR) 10 MG tablet, Take 1 tablet by mouth Every Night., Disp: , Rfl:     omeprazole (priLOSEC) 20 MG capsule, Take 1 capsule by mouth Daily., Disp: , Rfl:     tiotropium bromide monohydrate (SPIRIVA RESPIMAT) 2.5 MCG/ACT aerosol solution inhaler, Inhale 2 puffs Daily., Disp: 1 inhaler, Rfl: 5    topiramate (TOPAMAX) 50 MG tablet, Take 1 tablet by mouth 2 (Two) Times a Day., Disp: , Rfl:     History:   Past Medical History:   Diagnosis Date    Abnormal CT lung screening     Arthritis     hip     COPD (chronic obstructive pulmonary disease)     Full dentures     GERD (gastroesophageal reflux disease)     Heat stroke     x 4    Hyperlipidemia     Hypertension     Lung cancer     Migraine     On home oxygen therapy     given 6-2019 but has not used    Pneumonia 2019    PONV (postoperative nausea and vomiting)     Restless leg syndrome     Skin cancer     right side of face removed     Thyroid cancer     Tobacco abuse     Wears reading eyeglasses        Past Surgical History:   Procedure Laterality Date    ARTERIAL ANEURYSM REPAIR  2008    AAA Repair by Dr. Omi Cheung    BRONCHOSCOPY N/A 6/27/2019    Procedure: BRONCHOSCOPY WITH ENDOBRONCHIAL ULTRASOUND;  Surgeon: Iggy Beard MD;  Location: [facility] OR;  Service: Pulmonary    BRONCHOSCOPY N/A 8/22/2019    Procedure: BRONCHOSCOPY;  Surgeon: Shabbir Nance MD;  Location: [facility] OR;  Service: Cardiothoracic    CATARACT EXTRACTION Bilateral      Cataract extraction with IOL    LYMPH NODE DISSECTION Right 8/22/2019    Procedure: MEDIASTINAL LYMPH NODE DISSECTION RIGHT;  Surgeon: Shabbir Nance MD;  Location: [facility] OR;  Service: Cardiothoracic    SKIN BIOPSY      right face    THORACOSCOPY Right 8/22/2019    Procedure: THORACOSCOPY VIDEO ASSISTED WITH RIGHT UPPER LOBECTOMY, INTERCOSTAL NERVE BLOCKS WITH CRYOABLATION;  Surgeon: Shabbir Nance MD;  Location: [facility] OR;  Service: Cardiothoracic    THYROIDECTOMY, PARTIAL Right 11/13/2019    lymph nodes also       Social History     Socioeconomic History    Marital status:     Number of children: 5   Tobacco Use    Smoking status: Every Day     Current packs/day: 1.00     Average packs/day: 1 pack/day for 57.0 years (57.0 ttl pk-yrs)     Types: Cigarettes    Smokeless tobacco: Never   Vaping Use    Vaping status: Never Used   Substance and Sexual Activity    Alcohol use: No    Drug use: No    Sexual activity: Defer        Family History   Problem Relation Age of Onset    Hypertension Mother     Stroke Father        Objective   Physical Exam:  There were no vitals filed for this visit.   There is no height or weight on file to calculate BMI.    Physical Exam  Vitals reviewed.   Constitutional:       General: He is not in acute distress.     Appearance: He is not toxic-appearing.   HENT:      Head: Normocephalic and atraumatic.   Eyes:      General: Lids are normal.      Conjunctiva/sclera: Conjunctivae normal.      Pupils: Pupils are equal, round, and reactive to light.   Neck:      Vascular: No carotid bruit.   Cardiovascular:      Rate and Rhythm: Normal rate and regular rhythm.      Heart sounds: S1 normal and S2 normal. No murmur heard.  Pulmonary:      Effort: Pulmonary effort is normal. No respiratory distress.      Breath sounds: No decreased breath sounds, wheezing, rhonchi or rales.   Musculoskeletal:         General: Normal range of motion.      Cervical back: Normal range of motion and neck  supple.      Right lower leg: No edema.      Left lower leg: No edema.   Lymphadenopathy:      Cervical: No cervical adenopathy.      Upper Body:      Right upper body: No supraclavicular adenopathy.      Left upper body: No supraclavicular adenopathy.   Skin:     General: Skin is warm and dry.      Capillary Refill: Capillary refill takes less than 2 seconds.   Neurological:      General: No focal deficit present.      Mental Status: He is alert and oriented to person, place, and time.   Psychiatric:         Attention and Perception: Attention normal.         Mood and Affect: Mood normal.         Speech: Speech normal.         Behavior: Behavior normal. Behavior is cooperative.         Imaging/Labs:  ***  CT Angiogram Abdomen Pelvis    Result Date: 5/15/2024   1. Infrarenal abdominal aortic aneurysm status post endograft repair without evidence of endoleak or aneurysm sac enlargement.  This report was finalized on 5/15/2024 3:39 PM by Vahid Larson MD.      CT Chest With & Without Contrast Diagnostic    Result Date: 4/30/2024  Stable postsurgical change and left lower lobe 6 mm nodule, recommend continued follow-up.    CTDI: 5.76 mGy,5.82 mGy DLP:499.16 mGy.cm  This report was signed and finalized on 4/30/2024 4:30 PM by Maggie Brown MD.      US Aorta Limited    Result Date: 4/30/2024  Dilated abdominal aorta proximal to the aortobiiliac stent. Recommend CTA.          Images were reviewed, interpreted, and dictated by Dr. Maggie Brown MD Transcribed by Amy Cervantes PA-C.  This report was signed and finalized on 4/30/2024 2:57 PM by Maggie Brown MD.        CT Chest With & Without Contrast Diagnostic: 4/15/2023  Personally reviewed and agree w/ Radiology interpretation  Impression:   1. No evidence of recurrent or metastatic disease.  2. No acute intrathoracic abnormality.   3. Stable chronic findings as above.         Signed and finalized on 4/15/2023 2:29 PM by Dena Matta MD.     US aaa screen limited-Result  Date: 4/29/2022  No evidence of an abdominal aneurysm.      This report was signed and finalized on 4/29/2022 10:08 AM by Shanel Hazel M.D..     CT Chest With & Without Contrast Diagnostic-Result Date: 4/29/2022  No evidence of metastatic disease within the chest.   This study was performed with techniques to keep radiation doses as low as reasonably achievable (ALARA). Individualized dose reduction techniques using automated exposure control or adjustment of mA and/or kV according to the patient size were employed.  This report was signed and finalized on 4/29/2022 9:49 AM by Shanel Hazel M.D..     CT Chest With & Without Contrast Diagnostic-Result Date: 12/15/2021  Emphysematous changes with no acute cardiopulmonary process.     CT Chest With & Without Contrast Diagnostic-Result Date: 10/19/2021  No suspicious pulmonary mass or nodule. No evidence of recurrent or metastatic disease in the chest.     CT Abdomen Pelvis-4/14/2021  The patient is status post stent graft repair of an abdominal aortic aneurysm with the maximum AP dimension of the aorta measuring 3.2 cm. There is no evidence of an endoleak.  IMPRESSION: No acute intra-abdominal or pelvic process.     CT Chest With & Without Contrast Diagnostic-Result Date: 2/8/2021  Stable appearance of the chest with background emphysematous changes and postsurgical changes of the right lung. No new or enlarging suspicious pulmonary nodules.     CT Chest With & Without Contrast Diagnostic-Result Date: 7/28/2020  Postsurgical changes identified within the right lower lung field. Volume loss is present with no evidence of local recurrence or metastatic disease. Underlying chronic and emphysematous change is seen throughout the lung fields.     CT Lung Screening-5/23/2019  Lungs and airways: The central airways are patent. Emphysematous changes are noted. There is tree in bud nodular opacity of the right middle lobe, image 93 of series 2. A calcified granuloma  is noted of the right lung base.   Nodules, right lung: At the right apex, there is a 2.6 x 2.1 cm spiculated nodule, image 19 of series 2. A 4 mm nodule is noted on the right along the major fissure, image 47 of series 2.   Nodules, left lung:   None.   Pleural: Normal.   Mediastinum: The heart is normal in size. No pericardial effusion. Thoracic aorta is normal in caliber. There is atherosclerotic calcification of the aortic arch and primary branches, including the coronaries.   Lymph nodes: No discrete abnormal thoracic lymphadenopathy, however evaluation is limited due to lack of intravenous contrast and technique. A subcarinal node is noted measuring up to 7 mm in short axis, image 57 of series 2. Additional calcified   mediastinal hilar lymph nodes are noted.   Chest wall: Normal.   Bones: No suspicious lytic or blastic lesions.   Upper abdomen: Multiple hepatic hypodense lesions are noted which are incompletely assessed. For example, within the right hepatic lobe, there is a 2.8 cm hypodense lesion, image 113 of series 2, and in the left hepatic lobe, a 1.9 cm hypodense lesion,   image 110 of series 2. Additional lesions are noted.    Assessment / Plan      Assessment / Plan:  Diagnoses and all orders for this visit:    1. Infrarenal abdominal aortic aneurysm (AAA) without rupture (Primary)    2. RLL adenocarcinoma; s/p RUL lobectomy/mediastinal lymph node dissection (Dr. Nance, 8/22/19)       ***    Patient Education:      Follow Up:   No follow-ups on file.   Or sooner for any further concerns or worsening sign and symptoms. If unable to reach us in the office please dial 911 or go to the nearest emergency department.      SHASHANK Vernon  [facility] Cardiothoracic Surgery    {Time Spent (Optional):26908}

## 2024-05-28 ENCOUNTER — OFFICE VISIT (OUTPATIENT)
Dept: CARDIAC SURGERY | Facility: CLINIC | Age: 79
End: 2024-05-28
Payer: MEDICARE

## 2024-05-28 VITALS
DIASTOLIC BLOOD PRESSURE: 74 MMHG | TEMPERATURE: 97.5 F | HEIGHT: 72 IN | SYSTOLIC BLOOD PRESSURE: 138 MMHG | OXYGEN SATURATION: 98 % | WEIGHT: 170.6 LBS | HEART RATE: 64 BPM | BODY MASS INDEX: 23.11 KG/M2

## 2024-05-28 DIAGNOSIS — C34.31 MALIGNANT NEOPLASM OF LOWER LOBE OF RIGHT LUNG: ICD-10-CM

## 2024-05-28 DIAGNOSIS — I71.43 INFRARENAL ABDOMINAL AORTIC ANEURYSM (AAA) WITHOUT RUPTURE: Primary | ICD-10-CM

## 2024-05-28 PROCEDURE — 3075F SYST BP GE 130 - 139MM HG: CPT | Performed by: REGISTERED NURSE

## 2024-05-28 PROCEDURE — 3078F DIAST BP <80 MM HG: CPT | Performed by: REGISTERED NURSE

## 2024-05-28 PROCEDURE — 99213 OFFICE O/P EST LOW 20 MIN: CPT | Performed by: REGISTERED NURSE

## 2024-05-28 RX ORDER — MEGESTROL ACETATE 125 MG/ML
625 SUSPENSION ORAL DAILY
COMMUNITY

## 2024-05-28 RX ORDER — ACETAMINOPHEN 160 MG
2000 TABLET,DISINTEGRATING ORAL DAILY
COMMUNITY
Start: 2024-05-16

## 2024-05-28 RX ORDER — FINASTERIDE 5 MG/1
1 TABLET, FILM COATED ORAL DAILY
COMMUNITY

## 2024-05-28 RX ORDER — SERTRALINE HYDROCHLORIDE 25 MG/1
1 TABLET, FILM COATED ORAL DAILY
COMMUNITY

## 2024-05-28 RX ORDER — ROPINIROLE 1 MG/1
1 TABLET, FILM COATED ORAL DAILY
COMMUNITY

## 2024-05-28 NOTE — PROGRESS NOTES
..     [facility] Cardiothoracic Surgery Office Follow Up Note    Date of Encounter: 2024     Name: Angi Marinelli  : 1945     Referred By: No ref. provider found  PCP: Georgiana Cortés DO    Chief Complaint:    Chief Complaint   Patient presents with    Follow-up     1 year follow up AAA and lung cancer screening.  S/p EVAR in  with recent imaging to review       Subjective      History of Present Illness:    It was nice to see Angi Marinelli in follow up.  He is a pleasant 79 y.o. male with PMH significant for hypertension, hyperlipidemia on statin therapy, current tobacco dependence, papillary thyroid carcinoma s/p partial thyroidectomy, COPD, abdominal aortic aneurysm s/p endograft repair by Dr. Cheung in  and stage IB invasive moderately differentiated adenocarcinoma of the right lung s/p right upper lobe lobectomy by Dr. Nance 2019.  Surgical margins negative with vascular invasion identified ([id number]).  .      Patient was last seen in office by SHASHANK Garcia on 2023 with CT chest demonstrating no recurrent or metastatic disease and a stable calcified LLL nodule.  Mr. Marinelli presents today for surveillance with imaging.  Patient reports a dry cough without hemoptysis.  He reports 10 lb weight loss but denies malaise/fatigue.  Patient denies unusual abdomen, back, or flank pain.  He continues to follow with Oncology (S. Liddle, MD) for his thyroid carcinoma.      Review of Systems:  Review of Systems   Constitutional: Positive for decreased appetite and weight loss. Negative for chills, diaphoresis, fever, malaise/fatigue, night sweats and weight gain.   HENT: Negative.  Negative for hoarse voice.    Eyes: Negative.  Negative for blurred vision, double vision and visual disturbance.   Cardiovascular:  Positive for dyspnea on exertion. Negative for chest pain, claudication, irregular heartbeat, leg swelling, near-syncope, orthopnea, palpitations, paroxysmal nocturnal dyspnea  and syncope.   Respiratory:  Positive for cough. Negative for hemoptysis, shortness of breath, sputum production and wheezing.    Hematologic/Lymphatic: Negative.  Negative for adenopathy and bleeding problem. Does not bruise/bleed easily.   Skin:  Negative for color change, nail changes, poor wound healing and rash.   Musculoskeletal:  Positive for arthritis (left hip). Negative for back pain, falls and muscle cramps.   Gastrointestinal: Negative.  Negative for abdominal pain, dysphagia and heartburn.   Genitourinary:  Positive for nocturia (X 2). Negative for flank pain.   Neurological:  Positive for paresthesias (bilateral hands). Negative for brief paralysis, disturbances in coordination, dizziness, focal weakness, headaches, light-headedness, loss of balance, numbness, sensory change, vertigo and weakness.   Psychiatric/Behavioral: Negative.  Negative for depression and suicidal ideas.    Allergic/Immunologic: Negative for persistent infections.     I have reviewed the following portions of the patient's history: problem list, current medications, allergies, past surgical history, past medical history, past social history, past family history, and ROS and confirm it's accurate.    Allergies:  Allergies   Allergen Reactions    Penicillins Anaphylaxis, Swelling and Rash    Nicotine Rash     Patch reaction       Medications:      Current Outpatient Medications:     amLODIPine (NORVASC) 10 MG tablet, Take 1 tablet by mouth Daily., Disp: , Rfl:     aspirin 81 MG chewable tablet, Chew 1 tablet Daily., Disp: , Rfl:     atorvastatin (Lipitor) 40 MG tablet, Take 1 tablet by mouth Daily., Disp: 90 tablet, Rfl: 3    Cholecalciferol (Vitamin D3) 50 MCG (2000 UT) capsule, Take 1 capsule by mouth Daily., Disp: , Rfl:     clonazePAM (KLONOPIN) 0.5 MG tablet, Take 1 tablet by mouth Every Night., Disp: 30 tablet, Rfl: 3    Cyanocobalamin ER 1000 MCG tablet controlled-release, , Disp: , Rfl:     finasteride (PROSCAR) 5 MG  tablet, Take 1 tablet by mouth Daily., Disp: , Rfl:     HYDROcodone-acetaminophen (NORCO) 5-325 MG per tablet, Take 1 tablet by mouth 2 (Two) Times a Day., Disp: 30 tablet, Rfl: 0    levalbuterol (XOPENEX HFA) 45 MCG/ACT inhaler, Inhale 1-2 puffs Every 4 (Four) Hours As Needed for Wheezing., Disp: , Rfl:     levothyroxine (SYNTHROID, LEVOTHROID) 50 MCG tablet, Take 1.5 tablets by mouth Daily., Disp: , Rfl:     megestrol (MEGACE ES) 625 MG/5ML suspension, Take 5 mL by mouth Daily., Disp: , Rfl:     montelukast (SINGULAIR) 10 MG tablet, Take 1 tablet by mouth Every Night., Disp: , Rfl:     omeprazole (priLOSEC) 20 MG capsule, Take 1 capsule by mouth Daily., Disp: , Rfl:     rOPINIRole (REQUIP) 1 MG tablet, Take 1 tablet by mouth Daily., Disp: , Rfl:     sertraline (ZOLOFT) 25 MG tablet, Take 1 tablet by mouth Daily., Disp: , Rfl:     tiotropium bromide monohydrate (SPIRIVA RESPIMAT) 2.5 MCG/ACT aerosol solution inhaler, Inhale 2 puffs Daily., Disp: 1 inhaler, Rfl: 5    topiramate (TOPAMAX) 50 MG tablet, Take 1 tablet by mouth 2 (Two) Times a Day., Disp: , Rfl:     History:   Past Medical History:   Diagnosis Date    Abnormal CT lung screening     Arthritis     hip     BPH (benign prostatic hyperplasia)     COPD (chronic obstructive pulmonary disease)     Full dentures     GERD (gastroesophageal reflux disease)     Heat stroke     x 4    Hyperlipidemia     Hypertension     Lung cancer     Migraine     On home oxygen therapy     given 6-2019 but has not used    Pneumonia 2019    PONV (postoperative nausea and vomiting)     Restless leg syndrome     Skin cancer     right side of face removed     Thyroid cancer     Tobacco abuse     Wears reading eyeglasses        Past Surgical History:   Procedure Laterality Date    ARTERIAL ANEURYSM REPAIR  2008    AAA Repair by Dr. Omi Cheung    BRONCHOSCOPY N/A 6/27/2019    Procedure: BRONCHOSCOPY WITH ENDOBRONCHIAL ULTRASOUND;  Surgeon: Iggy Beard MD;  Location: [facility]  "OR;  Service: Pulmonary    BRONCHOSCOPY N/A 8/22/2019    Procedure: BRONCHOSCOPY;  Surgeon: Shabbir Nance MD;  Location:  [facility] OR;  Service: Cardiothoracic    CATARACT EXTRACTION Bilateral     Cataract extraction with IOL    LYMPH NODE DISSECTION Right 8/22/2019    Procedure: MEDIASTINAL LYMPH NODE DISSECTION RIGHT;  Surgeon: Shabbir Nance MD;  Location:  [facility] OR;  Service: Cardiothoracic    SKIN BIOPSY      right face    THORACOSCOPY Right 8/22/2019    Procedure: THORACOSCOPY VIDEO ASSISTED WITH RIGHT UPPER LOBECTOMY, INTERCOSTAL NERVE BLOCKS WITH CRYOABLATION;  Surgeon: Shabbir Nance MD;  Location:  [facility] OR;  Service: Cardiothoracic    THYROIDECTOMY, PARTIAL Right 11/13/2019    lymph nodes also       Social History     Socioeconomic History    Marital status:     Number of children: 5   Tobacco Use    Smoking status: Every Day     Current packs/day: 0.25     Average packs/day: 1 pack/day for 62.4 years (62.2 ttl pk-yrs)     Types: Cigarettes     Start date: 1962    Smokeless tobacco: Never   Vaping Use    Vaping status: Never Used   Substance and Sexual Activity    Alcohol use: No    Drug use: No    Sexual activity: Defer        Family History   Problem Relation Age of Onset    Hypertension Mother     Stroke Father        Objective     Physical Exam:  Vitals:    05/28/24 1101 05/28/24 1102   BP: 130/70 138/74   BP Location: Left arm Right arm   Patient Position: Sitting Sitting   Pulse: 64    Temp: 97.5 °F (36.4 °C)    SpO2: 98%    Weight: 77.4 kg (170 lb 9.6 oz)    Height: 182.9 cm (72.01\")  Comment: pt reported       Body mass index is 23.13 kg/m².    Physical Exam  Vitals reviewed.   Constitutional:       General: He is not in acute distress.     Appearance: Normal appearance. He is not ill-appearing.   Eyes:      Pupils: Pupils are equal, round, and reactive to light.   Cardiovascular:      Rate and Rhythm: Normal rate and regular rhythm.      Heart sounds: No murmur heard.  Pulmonary:      " Effort: Pulmonary effort is normal.      Breath sounds: Normal breath sounds.   Skin:     General: Skin is warm and dry.   Neurological:      General: No focal deficit present.      Mental Status: He is alert and oriented to person, place, and time.   Psychiatric:         Mood and Affect: Mood normal.         Behavior: Behavior normal.         Thought Content: Thought content normal.         Judgment: Judgment normal.         Imaging/Labs:  CT Angiogram Abdomen Pelvis (05/13/2024 14:19)   CT Angiogram Abdomen Pelvis  Result Date: 5/15/2024   1. Infrarenal abdominal aortic aneurysm status post endograft repair without evidence of endoleak or aneurysm sac enlargement.  This report was finalized on 5/15/2024 3:39 PM by Vahid Larson MD.      CT Chest With & Without Contrast Diagnostic (04/30/2024 13:21)   CT Chest With & Without Contrast Diagnostic  Result Date: 4/30/2024  Stable postsurgical change and left lower lobe 6 mm nodule, recommend continued follow-up.    CTDI: 5.76 mGy,5.82 mGy DLP:499.16 mGy.cm  This report was signed and finalized on 4/30/2024 4:30 PM by Maggie Brown MD.      US Aorta Limited (04/30/2024 13:00)   US Aorta Limited  Result Date: 4/30/2024  Dilated abdominal aorta proximal to the aortobiiliac stent. Recommend CTA.          Images were reviewed, interpreted, and dictated by Dr. Maggie Brown MD Transcribed by Amy Cervantes PA-C.  This report was signed and finalized on 4/30/2024 2:57 PM by Maggie Brown MD.        ..I personally reviewed this report and imaging.    Assessment / Plan    Assessment / Plan:  Right upper lobe adenocarcinoma  S/p RUL lobectomy by Dr. Nance 8/2019.  Surgical margins negative with vascular invasion identified (pT2a [id number]).   Last seen in office by SHASHANK Garcia on 5/9/2023.  CT chest demonstrating no recurrent or metastatic disease and a stable calcified LLL nodule.   Presents today for surveillance with imaging.   Reports a dry cough without hemoptysis.   Reports a  10 lb weight loss but denies malaise/fatigue.  CT chest as resulted above under imaging/labs with stable findings.   Continues to follow with Oncology for his thyroid carcinoma.   Given it has been 5 years since his surgery, patient would like to continue surveillance with Oncology at this time due to travel difficulties and caring for his wife.     Abdominal aortic aneurysm without rupture   S/p EVAR by Dr. Cheung in 2008.  US suggested dilated abdominal aorta proximal to the aortobiliac stent.  CTA however was with stable findings as resulted above under imaging/labs.  Native sac was measured at 2.6 cm per report and on personal review.    Patient wishes to follow with PCP for surveillance.    He is to call our office if PCP is not willing to follow.    Okay to follow every 2 years at this point.       Follow Up:   We will follow on an as needed basis.   Patient encouraged to call the office with any questions or concerns.     Thank you for allowing me to participate in your care.  Best Regards,    SHASHANK Perez  [facility] Cardiothoracic Surgery  05/28/24  11:10 EDT    I spent 25 minutes caring for Mr. Marinelli on this date of service. This time includes time spent by me in the following activities: preparing for the visit, reviewing tests, obtaining and/or reviewing a separately obtained history, performing a medically appropriate examination and/or evaluation, counseling and educating the patient/family/caregiver, ordering medications, tests, or procedures, referring and communicating with other health care professionals, documenting information in the medical record, independently interpreting results and communicating that information with the patient/family/caregiver, and care coordination.

## 2024-09-25 ENCOUNTER — LAB (OUTPATIENT)
Dept: LAB | Facility: HOSPITAL | Age: 79
End: 2024-09-25
Payer: MEDICARE

## 2024-09-25 ENCOUNTER — TRANSCRIBE ORDERS (OUTPATIENT)
Dept: LAB | Facility: HOSPITAL | Age: 79
End: 2024-09-25
Payer: MEDICARE

## 2024-09-25 DIAGNOSIS — C34.90 MALIGNANT NEOPLASM OF UNSPECIFIED PART OF UNSPECIFIED BRONCHUS OR LUNG: ICD-10-CM

## 2024-09-25 DIAGNOSIS — C34.90 MALIGNANT NEOPLASM OF UNSPECIFIED PART OF UNSPECIFIED BRONCHUS OR LUNG: Primary | ICD-10-CM

## 2024-09-25 LAB
ALBUMIN SERPL-MCNC: 4 G/DL (ref 3.5–5.2)
ALBUMIN/GLOB SERPL: 1.6 G/DL
ALP SERPL-CCNC: 65 U/L (ref 39–117)
ALT SERPL W P-5'-P-CCNC: 10 U/L (ref 1–41)
ANION GAP SERPL CALCULATED.3IONS-SCNC: 9.8 MMOL/L (ref 5–15)
AST SERPL-CCNC: 17 U/L (ref 1–40)
BASOPHILS # BLD AUTO: 0.06 10*3/MM3 (ref 0–0.2)
BASOPHILS NFR BLD AUTO: 0.7 % (ref 0–1.5)
BILIRUB SERPL-MCNC: 0.4 MG/DL (ref 0–1.2)
BUN SERPL-MCNC: 17 MG/DL (ref 8–23)
BUN/CREAT SERPL: 15 (ref 7–25)
CALCIUM SPEC-SCNC: 9.3 MG/DL (ref 8.6–10.5)
CHLORIDE SERPL-SCNC: 111 MMOL/L (ref 98–107)
CO2 SERPL-SCNC: 23.2 MMOL/L (ref 22–29)
CREAT SERPL-MCNC: 1.13 MG/DL (ref 0.76–1.27)
DEPRECATED RDW RBC AUTO: 50.5 FL (ref 37–54)
EGFRCR SERPLBLD CKD-EPI 2021: 65.7 ML/MIN/1.73
EOSINOPHIL # BLD AUTO: 0.25 10*3/MM3 (ref 0–0.4)
EOSINOPHIL NFR BLD AUTO: 3 % (ref 0.3–6.2)
ERYTHROCYTE [DISTWIDTH] IN BLOOD BY AUTOMATED COUNT: 15.2 % (ref 12.3–15.4)
GLOBULIN UR ELPH-MCNC: 2.5 GM/DL
GLUCOSE SERPL-MCNC: 101 MG/DL (ref 65–99)
HCT VFR BLD AUTO: 37.6 % (ref 37.5–51)
HGB BLD-MCNC: 12.1 G/DL (ref 13–17.7)
IMM GRANULOCYTES # BLD AUTO: 0.03 10*3/MM3 (ref 0–0.05)
IMM GRANULOCYTES NFR BLD AUTO: 0.4 % (ref 0–0.5)
LYMPHOCYTES # BLD AUTO: 2.47 10*3/MM3 (ref 0.7–3.1)
LYMPHOCYTES NFR BLD AUTO: 29.2 % (ref 19.6–45.3)
MCH RBC QN AUTO: 28.9 PG (ref 26.6–33)
MCHC RBC AUTO-ENTMCNC: 32.2 G/DL (ref 31.5–35.7)
MCV RBC AUTO: 90 FL (ref 79–97)
MONOCYTES # BLD AUTO: 0.8 10*3/MM3 (ref 0.1–0.9)
MONOCYTES NFR BLD AUTO: 9.5 % (ref 5–12)
NEUTROPHILS NFR BLD AUTO: 4.85 10*3/MM3 (ref 1.7–7)
NEUTROPHILS NFR BLD AUTO: 57.2 % (ref 42.7–76)
NRBC BLD AUTO-RTO: 0 /100 WBC (ref 0–0.2)
PLATELET # BLD AUTO: 188 10*3/MM3 (ref 140–450)
PMV BLD AUTO: 10.2 FL (ref 6–12)
POTASSIUM SERPL-SCNC: 3.6 MMOL/L (ref 3.5–5.2)
PROT SERPL-MCNC: 6.5 G/DL (ref 6–8.5)
RBC # BLD AUTO: 4.18 10*6/MM3 (ref 4.14–5.8)
SODIUM SERPL-SCNC: 144 MMOL/L (ref 136–145)
T4 FREE SERPL-MCNC: 1.55 NG/DL (ref 0.92–1.68)
TSH SERPL DL<=0.05 MIU/L-ACNC: 0.66 UIU/ML (ref 0.27–4.2)
WBC NRBC COR # BLD AUTO: 8.46 10*3/MM3 (ref 3.4–10.8)

## 2024-09-25 PROCEDURE — 84443 ASSAY THYROID STIM HORMONE: CPT

## 2024-09-25 PROCEDURE — 84439 ASSAY OF FREE THYROXINE: CPT

## 2024-09-25 PROCEDURE — 36415 COLL VENOUS BLD VENIPUNCTURE: CPT

## 2024-09-25 PROCEDURE — 80053 COMPREHEN METABOLIC PANEL: CPT

## 2024-09-25 PROCEDURE — 85025 COMPLETE CBC W/AUTO DIFF WBC: CPT

## 2024-09-27 ENCOUNTER — TRANSCRIBE ORDERS (OUTPATIENT)
Dept: ADMINISTRATIVE | Facility: HOSPITAL | Age: 79
End: 2024-09-27
Payer: MEDICARE

## 2024-09-27 DIAGNOSIS — C34.11 MALIGNANT NEOPLASM OF UPPER LOBE OF RIGHT LUNG: Primary | ICD-10-CM

## 2024-09-27 DIAGNOSIS — I71.40 ABDOMINAL AORTIC ANEURYSM (AAA), UNSPECIFIED PART, UNSPECIFIED WHETHER RUPTURED: ICD-10-CM

## 2024-11-06 ENCOUNTER — HOSPITAL ENCOUNTER (OUTPATIENT)
Dept: CT IMAGING | Facility: HOSPITAL | Age: 79
Discharge: HOME | End: 2024-11-06
Admitting: INTERNAL MEDICINE
Payer: MEDICARE

## 2024-11-06 DIAGNOSIS — C34.11 MALIGNANT NEOPLASM OF UPPER LOBE OF RIGHT LUNG: ICD-10-CM

## 2024-11-06 DIAGNOSIS — I71.40 ABDOMINAL AORTIC ANEURYSM (AAA), UNSPECIFIED PART, UNSPECIFIED WHETHER RUPTURED: ICD-10-CM

## 2024-11-06 PROCEDURE — 71260 CT THORAX DX C+: CPT

## 2024-11-06 PROCEDURE — 25510000001 IOPAMIDOL 61 % SOLUTION: Performed by: INTERNAL MEDICINE

## 2024-11-06 RX ORDER — IOPAMIDOL 612 MG/ML
100 INJECTION, SOLUTION INTRAVASCULAR
Status: COMPLETED | OUTPATIENT
Start: 2024-11-06 | End: 2024-11-06

## 2024-11-06 RX ADMIN — IOPAMIDOL 100 ML: 612 INJECTION, SOLUTION INTRAVENOUS at 13:23

## 2025-04-01 ENCOUNTER — TRANSCRIBE ORDERS (OUTPATIENT)
Dept: ADMINISTRATIVE | Facility: HOSPITAL | Age: 80
End: 2025-04-01
Payer: MEDICARE

## 2025-04-01 DIAGNOSIS — C34.11 MALIGNANT NEOPLASM OF UPPER LOBE OF RIGHT LUNG: Primary | ICD-10-CM

## 2025-04-15 ENCOUNTER — TRANSCRIBE ORDERS (OUTPATIENT)
Dept: ADMINISTRATIVE | Facility: HOSPITAL | Age: 80
End: 2025-04-15
Payer: MEDICARE

## 2025-04-15 DIAGNOSIS — I71.40 ABDOMINAL AORTIC ANEURYSM (AAA) WITHOUT RUPTURE, UNSPECIFIED PART: Primary | ICD-10-CM

## 2025-04-15 DIAGNOSIS — C34.11 MALIGNANT NEOPLASM OF UPPER LOBE OF RIGHT LUNG: ICD-10-CM

## 2025-05-14 ENCOUNTER — HOSPITAL ENCOUNTER (OUTPATIENT)
Dept: CT IMAGING | Facility: HOSPITAL | Age: 80
Discharge: HOME | End: 2025-05-14
Payer: MEDICARE

## 2025-05-14 DIAGNOSIS — C34.11 MALIGNANT NEOPLASM OF UPPER LOBE OF RIGHT LUNG: ICD-10-CM

## 2025-05-14 DIAGNOSIS — I71.40 ABDOMINAL AORTIC ANEURYSM (AAA) WITHOUT RUPTURE, UNSPECIFIED PART: ICD-10-CM

## 2025-05-14 PROCEDURE — 74177 CT ABD & PELVIS W/CONTRAST: CPT

## 2025-05-14 PROCEDURE — 71260 CT THORAX DX C+: CPT

## 2025-05-14 PROCEDURE — 25510000001 IOPAMIDOL 61 % SOLUTION: Performed by: INTERNAL MEDICINE

## 2025-05-14 RX ORDER — IOPAMIDOL 612 MG/ML
100 INJECTION, SOLUTION INTRAVASCULAR
Status: COMPLETED | OUTPATIENT
Start: 2025-05-14 | End: 2025-05-14

## 2025-05-14 RX ADMIN — IOPAMIDOL 100 ML: 612 INJECTION, SOLUTION INTRAVENOUS at 09:29

## 2025-05-21 ENCOUNTER — TRANSCRIBE ORDERS (OUTPATIENT)
Dept: LAB | Facility: HOSPITAL | Age: 80
End: 2025-05-21
Payer: MEDICARE

## 2025-05-21 DIAGNOSIS — C73 THYROID CANCER: Primary | ICD-10-CM

## 2025-05-21 DIAGNOSIS — I71.40 ABDOMINAL AORTIC ANEURYSM (AAA) WITHOUT RUPTURE, UNSPECIFIED PART: ICD-10-CM

## 2025-05-21 DIAGNOSIS — C34.11 MALIGNANT NEOPLASM OF UPPER LOBE OF RIGHT LUNG: ICD-10-CM

## 2025-05-22 ENCOUNTER — LAB (OUTPATIENT)
Dept: LAB | Facility: HOSPITAL | Age: 80
End: 2025-05-22
Payer: MEDICARE

## 2025-05-22 ENCOUNTER — LAB REQUISITION (OUTPATIENT)
Dept: LAB | Facility: HOSPITAL | Age: 80
End: 2025-05-22
Payer: MEDICARE

## 2025-05-22 DIAGNOSIS — C73 THYROID CANCER: ICD-10-CM

## 2025-05-22 DIAGNOSIS — C34.11 MALIGNANT NEOPLASM OF UPPER LOBE OF RIGHT LUNG: ICD-10-CM

## 2025-05-22 DIAGNOSIS — D64.9 ANEMIA, UNSPECIFIED: ICD-10-CM

## 2025-05-22 DIAGNOSIS — I71.40 ABDOMINAL AORTIC ANEURYSM (AAA) WITHOUT RUPTURE, UNSPECIFIED PART: ICD-10-CM

## 2025-05-22 LAB
ALBUMIN SERPL-MCNC: 4 G/DL (ref 3.5–5.2)
ALBUMIN/GLOB SERPL: 1.4 G/DL
ALP SERPL-CCNC: 61 U/L (ref 39–117)
ALT SERPL W P-5'-P-CCNC: 10 U/L (ref 1–41)
ANION GAP SERPL CALCULATED.3IONS-SCNC: 10.2 MMOL/L (ref 5–15)
AST SERPL-CCNC: 15 U/L (ref 1–40)
BASOPHILS # BLD AUTO: 0.07 10*3/MM3 (ref 0–0.2)
BASOPHILS NFR BLD AUTO: 0.8 % (ref 0–1.5)
BILIRUB SERPL-MCNC: 0.3 MG/DL (ref 0–1.2)
BUN SERPL-MCNC: 13 MG/DL (ref 8–23)
BUN/CREAT SERPL: 10.9 (ref 7–25)
CALCIUM SPEC-SCNC: 9.5 MG/DL (ref 8.6–10.5)
CHLORIDE SERPL-SCNC: 110 MMOL/L (ref 98–107)
CO2 SERPL-SCNC: 22.8 MMOL/L (ref 22–29)
CREAT SERPL-MCNC: 1.19 MG/DL (ref 0.76–1.27)
DEPRECATED RDW RBC AUTO: 49.7 FL (ref 37–54)
EGFRCR SERPLBLD CKD-EPI 2021: 61.4 ML/MIN/1.73
EOSINOPHIL # BLD AUTO: 0.18 10*3/MM3 (ref 0–0.4)
EOSINOPHIL NFR BLD AUTO: 2.1 % (ref 0.3–6.2)
ERYTHROCYTE [DISTWIDTH] IN BLOOD BY AUTOMATED COUNT: 16.8 % (ref 12.3–15.4)
FERRITIN SERPL-MCNC: 18.01 NG/ML (ref 30–400)
GLOBULIN UR ELPH-MCNC: 2.8 GM/DL
GLUCOSE SERPL-MCNC: 89 MG/DL (ref 65–99)
HCT VFR BLD AUTO: 35.1 % (ref 37.5–51)
HGB BLD-MCNC: 11.2 G/DL (ref 13–17.7)
IMM GRANULOCYTES # BLD AUTO: 0.05 10*3/MM3 (ref 0–0.05)
IMM GRANULOCYTES NFR BLD AUTO: 0.6 % (ref 0–0.5)
IRON 24H UR-MRATE: 23 MCG/DL (ref 59–158)
IRON SATN MFR SERPL: 5 % (ref 20–50)
LYMPHOCYTES # BLD AUTO: 2.2 10*3/MM3 (ref 0.7–3.1)
LYMPHOCYTES NFR BLD AUTO: 25.9 % (ref 19.6–45.3)
MCH RBC QN AUTO: 26 PG (ref 26.6–33)
MCHC RBC AUTO-ENTMCNC: 31.9 G/DL (ref 31.5–35.7)
MCV RBC AUTO: 81.6 FL (ref 79–97)
MONOCYTES # BLD AUTO: 0.69 10*3/MM3 (ref 0.1–0.9)
MONOCYTES NFR BLD AUTO: 8.1 % (ref 5–12)
NEUTROPHILS NFR BLD AUTO: 5.32 10*3/MM3 (ref 1.7–7)
NEUTROPHILS NFR BLD AUTO: 62.5 % (ref 42.7–76)
NRBC BLD AUTO-RTO: 0 /100 WBC (ref 0–0.2)
PLATELET # BLD AUTO: 243 10*3/MM3 (ref 140–450)
PMV BLD AUTO: 9.4 FL (ref 6–12)
POTASSIUM SERPL-SCNC: 4.2 MMOL/L (ref 3.5–5.2)
PROT SERPL-MCNC: 6.8 G/DL (ref 6–8.5)
RBC # BLD AUTO: 4.3 10*6/MM3 (ref 4.14–5.8)
SODIUM SERPL-SCNC: 143 MMOL/L (ref 136–145)
T4 FREE SERPL-MCNC: 1.46 NG/DL (ref 0.92–1.68)
TIBC SERPL-MCNC: 468 MCG/DL (ref 298–536)
TRANSFERRIN SERPL-MCNC: 314 MG/DL (ref 200–360)
TSH SERPL DL<=0.05 MIU/L-ACNC: 0.76 UIU/ML (ref 0.27–4.2)
WBC NRBC COR # BLD AUTO: 8.51 10*3/MM3 (ref 3.4–10.8)

## 2025-05-22 PROCEDURE — 84466 ASSAY OF TRANSFERRIN: CPT | Performed by: INTERNAL MEDICINE

## 2025-05-22 PROCEDURE — 84443 ASSAY THYROID STIM HORMONE: CPT

## 2025-05-22 PROCEDURE — 84439 ASSAY OF FREE THYROXINE: CPT

## 2025-05-22 PROCEDURE — 83540 ASSAY OF IRON: CPT | Performed by: INTERNAL MEDICINE

## 2025-05-22 PROCEDURE — 80053 COMPREHEN METABOLIC PANEL: CPT

## 2025-05-22 PROCEDURE — 36415 COLL VENOUS BLD VENIPUNCTURE: CPT

## 2025-05-22 PROCEDURE — 85025 COMPLETE CBC W/AUTO DIFF WBC: CPT

## 2025-05-22 PROCEDURE — 82728 ASSAY OF FERRITIN: CPT | Performed by: INTERNAL MEDICINE

## 2025-07-31 ENCOUNTER — TRANSCRIBE ORDERS (OUTPATIENT)
Dept: ADMINISTRATIVE | Facility: HOSPITAL | Age: 80
End: 2025-07-31
Payer: MEDICARE

## 2025-07-31 DIAGNOSIS — C34.11 MALIGNANT NEOPLASM OF UPPER LOBE OF RIGHT LUNG: Primary | ICD-10-CM

## 2025-08-13 ENCOUNTER — HOSPITAL ENCOUNTER (OUTPATIENT)
Dept: CT IMAGING | Facility: HOSPITAL | Age: 80
Discharge: HOME | End: 2025-08-13
Admitting: INTERNAL MEDICINE
Payer: MEDICARE

## 2025-08-13 DIAGNOSIS — C34.11 MALIGNANT NEOPLASM OF UPPER LOBE OF RIGHT LUNG: ICD-10-CM

## 2025-08-13 PROCEDURE — 25510000001 IOPAMIDOL 61 % SOLUTION: Performed by: INTERNAL MEDICINE

## 2025-08-13 PROCEDURE — 71260 CT THORAX DX C+: CPT

## 2025-08-13 RX ORDER — IOPAMIDOL 612 MG/ML
100 INJECTION, SOLUTION INTRAVASCULAR
Status: COMPLETED | OUTPATIENT
Start: 2025-08-13 | End: 2025-08-13

## 2025-08-13 RX ADMIN — IOPAMIDOL 100 ML: 612 INJECTION, SOLUTION INTRAVENOUS at 14:44

## (undated) DEVICE — TRAP,MUCUS SPECIMEN,40CC: Brand: MEDLINE

## (undated) DEVICE — LINEAR ADAPTER: Brand: SIGNIA

## (undated) DEVICE — SYR SLP TP 10ML DISP

## (undated) DEVICE — CVR HNDL LT SURG ACCSSRY BLU STRL

## (undated) DEVICE — MAJ-1414 SINGLE USE ADPATER BIOPSY VALV: Brand: SINGLE USE ADAPTOR BIOPSY VALVE

## (undated) DEVICE — NDL HYPO ECLPS SFTY 18G 1 1/2IN

## (undated) DEVICE — SYS PROB ABL/CRYO CRYOSPHERE 18IN

## (undated) DEVICE — TISSUE RETRIEVAL SYSTEM: Brand: INZII RETRIEVAL SYSTEM

## (undated) DEVICE — 1860S HEALTH CARE RESPIRATOR N95 120EA/C: Brand: 3M™

## (undated) DEVICE — SINGLE USE ASPIRATION NEEDLE: Brand: SINGLE USE ASPIRATION NEEDLE

## (undated) DEVICE — SUT VIC 2/0 CT2 27IN J269H

## (undated) DEVICE — SYR LL TP 10ML STRL

## (undated) DEVICE — SUCTION CANISTER, 2500CC, RIGID: Brand: DEROYAL

## (undated) DEVICE — Device: Brand: MEDEX

## (undated) DEVICE — GLV SURG SENSICARE MICRO PF LF 6.5 STRL

## (undated) DEVICE — ELECTRD BLD EDGE/INSUL1P 2.4X5.1MM STRL

## (undated) DEVICE — MAGNETIC DRAPE: Brand: DEVON

## (undated) DEVICE — SPNG GZ WOVN 4X4IN 12PLY 10/BX STRL

## (undated) DEVICE — PK THORACOTOMY 10

## (undated) DEVICE — SINGLE USE BIOPSY VALVE MAJ-210: Brand: SINGLE USE BIOPSY VALVE (STERILE)

## (undated) DEVICE — SINGLE USE SUCTION VALVE MAJ-209: Brand: SINGLE USE SUCTION VALVE (STERILE)

## (undated) DEVICE — GLV SURG SENSICARE W/ALOE PF LF 7.5 STRL

## (undated) DEVICE — ANTIBACTERIAL UNDYED BRAIDED (POLYGLACTIN 910), SYNTHETIC ABSORBABLE SURGICAL SUTURE: Brand: COATED VICRYL

## (undated) DEVICE — GLV SURG SENSICARE MICRO PF LF 7 STRL

## (undated) DEVICE — SUT MNCRYL PLS ANTIB UD 4/0 PS2 18IN

## (undated) DEVICE — CUFF SCD HEMOFORCE SEQ CALF STD MD

## (undated) DEVICE — GLV SURG DERMASSURE GRN LF PF 7.0

## (undated) DEVICE — ST NDL BRONCH ASP VIZISHOT 2 FLX 19GA

## (undated) DEVICE — BRUSH CYTO ENDO CELLEBRITY 1X2MM 140CM

## (undated) DEVICE — SYR SLPTP 30CC

## (undated) DEVICE — BOWL UTIL STRL 32OZ

## (undated) DEVICE — SKIN AFFIX SURG ADHESIVE 72/CS 0.55ML: Brand: MEDLINE

## (undated) DEVICE — ELECTRD BLD EDGE/INSUL1P SFTY SLV 2.75IN

## (undated) DEVICE — ELECTRD BLD EXT EDGE/INSUL 6IN

## (undated) DEVICE — 3-WAY STOPCOCK: Brand: MAXGUARD

## (undated) DEVICE — TUBING, SUCTION, 1/4" X 10', STRAIGHT: Brand: MEDLINE

## (undated) DEVICE — TUBING, SUCTION, 1/4" X 12', STRAIGHT: Brand: MEDLINE

## (undated) DEVICE — ENDOPATH 5MM ENDOSCOPIC BLUNT TIP DISSECTORS (12 POUCHES CONTAINING 3 DISSECTORS EACH): Brand: ENDOPATH

## (undated) DEVICE — SUT ETHIB 1 CT1 30IN  X425H

## (undated) DEVICE — Device: Brand: BALLOON

## (undated) DEVICE — TAPE MICROFM 2IN LF

## (undated) DEVICE — SAFESECURE,SECUREMENT,FOLEY CATH,STERILE: Brand: MEDLINE

## (undated) DEVICE — DISPOSABLE BIOPSY FORCEPS: Brand: DISPOSABLE BIOPSY FORCEPS

## (undated) DEVICE — VISUALIZATION SYSTEM: Brand: CLEARIFY

## (undated) DEVICE — CLTH CLENS READYCLEANSE PERI CARE PK/5

## (undated) DEVICE — ADAPT SWVL FIBROPTIC BRONCH